# Patient Record
Sex: MALE | Race: BLACK OR AFRICAN AMERICAN | NOT HISPANIC OR LATINO | Employment: OTHER | URBAN - METROPOLITAN AREA
[De-identification: names, ages, dates, MRNs, and addresses within clinical notes are randomized per-mention and may not be internally consistent; named-entity substitution may affect disease eponyms.]

---

## 2017-01-01 ENCOUNTER — PATIENT OUTREACH (OUTPATIENT)
Dept: ADMINISTRATIVE | Facility: CLINIC | Age: 67
End: 2017-01-01

## 2017-01-01 ENCOUNTER — HOSPITAL ENCOUNTER (INPATIENT)
Facility: HOSPITAL | Age: 67
LOS: 6 days | DRG: 441 | End: 2017-12-14
Attending: SPECIALIST | Admitting: FAMILY MEDICINE
Payer: MEDICARE

## 2017-01-01 ENCOUNTER — HOSPITAL ENCOUNTER (INPATIENT)
Facility: HOSPITAL | Age: 67
LOS: 10 days | Discharge: NURSING, REHAB OR LONG-TERM CARE | DRG: 974 | End: 2017-11-22
Attending: EMERGENCY MEDICINE | Admitting: INTERNAL MEDICINE
Payer: MEDICARE

## 2017-01-01 ENCOUNTER — HOSPITAL ENCOUNTER (EMERGENCY)
Facility: HOSPITAL | Age: 67
Discharge: HOME | End: 2017-11-02
Attending: EMERGENCY MEDICINE
Payer: MEDICARE

## 2017-01-01 VITALS
OXYGEN SATURATION: 99 % | HEART RATE: 106 BPM | BODY MASS INDEX: 26.65 KG/M2 | WEIGHT: 201.06 LBS | DIASTOLIC BLOOD PRESSURE: 67 MMHG | HEIGHT: 73 IN | TEMPERATURE: 97 F | SYSTOLIC BLOOD PRESSURE: 144 MMHG | RESPIRATION RATE: 18 BRPM

## 2017-01-01 VITALS — TEMPERATURE: 32 F | BODY MASS INDEX: 26.03 KG/M2 | HEIGHT: 73 IN | WEIGHT: 196.44 LBS

## 2017-01-01 VITALS
OXYGEN SATURATION: 98 % | HEART RATE: 86 BPM | SYSTOLIC BLOOD PRESSURE: 124 MMHG | RESPIRATION RATE: 16 BRPM | WEIGHT: 207 LBS | BODY MASS INDEX: 27.43 KG/M2 | TEMPERATURE: 98 F | HEIGHT: 73 IN | DIASTOLIC BLOOD PRESSURE: 65 MMHG

## 2017-01-01 DIAGNOSIS — J18.9 PNEUMONIA DUE TO INFECTIOUS ORGANISM, UNSPECIFIED LATERALITY, UNSPECIFIED PART OF LUNG: Primary | ICD-10-CM

## 2017-01-01 DIAGNOSIS — R53.1 WEAKNESS: ICD-10-CM

## 2017-01-01 DIAGNOSIS — R41.82 ALTERED MENTAL STATUS: ICD-10-CM

## 2017-01-01 DIAGNOSIS — E72.20 HYPERAMMONEMIA: ICD-10-CM

## 2017-01-01 DIAGNOSIS — R09.02 HYPOXIA: ICD-10-CM
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
## 2017-01-01 DIAGNOSIS — J96.21 ACUTE ON CHRONIC RESPIRATORY FAILURE WITH HYPOXIA: ICD-10-CM

## 2017-01-01 DIAGNOSIS — B20 AIDS (ACQUIRED IMMUNODEFICIENCY SYNDROME), CD4 <=200: ICD-10-CM

## 2017-01-01 DIAGNOSIS — B20 AIDS (ACQUIRED IMMUNODEFICIENCY SYNDROME), CD4 >200 AND <500: Chronic | ICD-10-CM

## 2017-01-01 DIAGNOSIS — K74.60 CIRRHOSIS OF LIVER WITH ASCITES, UNSPECIFIED HEPATIC CIRRHOSIS TYPE: ICD-10-CM

## 2017-01-01 DIAGNOSIS — J18.9 PNEUMONIA DUE TO INFECTIOUS ORGANISM: ICD-10-CM

## 2017-01-01 DIAGNOSIS — R53.1 WEAKNESS: Primary | ICD-10-CM

## 2017-01-01 DIAGNOSIS — J18.9 PNEUMONIA OF BOTH LUNGS DUE TO INFECTIOUS ORGANISM, UNSPECIFIED PART OF LUNG: ICD-10-CM

## 2017-01-01 DIAGNOSIS — G93.40 ENCEPHALOPATHY ACUTE: ICD-10-CM

## 2017-01-01 DIAGNOSIS — E11.9 TYPE 2 DIABETES MELLITUS: Chronic | ICD-10-CM

## 2017-01-01 DIAGNOSIS — R18.8 CIRRHOSIS OF LIVER WITH ASCITES, UNSPECIFIED HEPATIC CIRRHOSIS TYPE: ICD-10-CM

## 2017-01-01 DIAGNOSIS — G93.40 ENCEPHALOPATHY ACUTE: Primary | ICD-10-CM

## 2017-01-01 LAB
ALBUMIN SERPL BCP-MCNC: 1.6 G/DL
ALBUMIN SERPL BCP-MCNC: 1.7 G/DL
ALBUMIN SERPL BCP-MCNC: 1.8 G/DL
ALBUMIN SERPL BCP-MCNC: 1.9 G/DL
ALBUMIN SERPL BCP-MCNC: 2 G/DL
ALBUMIN SERPL BCP-MCNC: 2.1 G/DL
ALBUMIN SERPL BCP-MCNC: 2.2 G/DL
ALBUMIN SERPL BCP-MCNC: 2.3 G/DL
ALBUMIN SERPL BCP-MCNC: 2.3 G/DL
ALBUMIN SERPL BCP-MCNC: 2.4 G/DL
ALBUMIN SERPL BCP-MCNC: 2.4 G/DL
ALBUMIN SERPL BCP-MCNC: 2.8 G/DL
ALLENS TEST: ABNORMAL
ALP SERPL-CCNC: 104 U/L
ALP SERPL-CCNC: 105 U/L
ALP SERPL-CCNC: 106 U/L
ALP SERPL-CCNC: 107 U/L
ALP SERPL-CCNC: 126 U/L
ALP SERPL-CCNC: 133 U/L
ALP SERPL-CCNC: 146 U/L
ALP SERPL-CCNC: 156 U/L
ALP SERPL-CCNC: 179 U/L
ALP SERPL-CCNC: 205 U/L
ALP SERPL-CCNC: 215 U/L
ALP SERPL-CCNC: 234 U/L
ALP SERPL-CCNC: 252 U/L
ALP SERPL-CCNC: 278 U/L
ALP SERPL-CCNC: 310 U/L
ALP SERPL-CCNC: 331 U/L
ALP SERPL-CCNC: 331 U/L
ALP SERPL-CCNC: 345 U/L
ALP SERPL-CCNC: 350 U/L
ALP SERPL-CCNC: 405 U/L
ALT SERPL W/O P-5'-P-CCNC: 106 U/L
ALT SERPL W/O P-5'-P-CCNC: 109 U/L
ALT SERPL W/O P-5'-P-CCNC: 118 U/L
ALT SERPL W/O P-5'-P-CCNC: 126 U/L
ALT SERPL W/O P-5'-P-CCNC: 131 U/L
ALT SERPL W/O P-5'-P-CCNC: 132 U/L
ALT SERPL W/O P-5'-P-CCNC: 140 U/L
ALT SERPL W/O P-5'-P-CCNC: 143 U/L
ALT SERPL W/O P-5'-P-CCNC: 145 U/L
ALT SERPL W/O P-5'-P-CCNC: 149 U/L
ALT SERPL W/O P-5'-P-CCNC: 177 U/L
ALT SERPL W/O P-5'-P-CCNC: 237 U/L
ALT SERPL W/O P-5'-P-CCNC: 32 U/L
ALT SERPL W/O P-5'-P-CCNC: 33 U/L
ALT SERPL W/O P-5'-P-CCNC: 40 U/L
ALT SERPL W/O P-5'-P-CCNC: 43 U/L
ALT SERPL W/O P-5'-P-CCNC: 45 U/L
ALT SERPL W/O P-5'-P-CCNC: 53 U/L
ALT SERPL W/O P-5'-P-CCNC: 66 U/L
ALT SERPL W/O P-5'-P-CCNC: 97 U/L
AMMONIA PLAS-SCNC: 100 UMOL/L
AMMONIA PLAS-SCNC: 145 UMOL/L
AMMONIA PLAS-SCNC: 28 UMOL/L
AMMONIA PLAS-SCNC: 37 UMOL/L
AMMONIA PLAS-SCNC: 58 UMOL/L
AMMONIA PLAS-SCNC: 75 UMOL/L
AMMONIA PLAS-SCNC: 86 UMOL/L
ANION GAP SERPL CALC-SCNC: 10 MMOL/L
ANION GAP SERPL CALC-SCNC: 4 MMOL/L
ANION GAP SERPL CALC-SCNC: 4 MMOL/L
ANION GAP SERPL CALC-SCNC: 5 MMOL/L
ANION GAP SERPL CALC-SCNC: 5 MMOL/L
ANION GAP SERPL CALC-SCNC: 6 MMOL/L
ANION GAP SERPL CALC-SCNC: 7 MMOL/L
ANION GAP SERPL CALC-SCNC: 8 MMOL/L
ANION GAP SERPL CALC-SCNC: 9 MMOL/L
ANISOCYTOSIS BLD QL SMEAR: SLIGHT
ANISOCYTOSIS BLD QL SMEAR: SLIGHT
APAP SERPL-MCNC: <3 UG/ML
APPEARANCE FLD: NORMAL
APTT BLDCRRT: 30.8 SEC
APTT BLDCRRT: 31.3 SEC
APTT BLDCRRT: 32.7 SEC
AST SERPL-CCNC: 100 U/L
AST SERPL-CCNC: 107 U/L
AST SERPL-CCNC: 119 U/L
AST SERPL-CCNC: 122 U/L
AST SERPL-CCNC: 123 U/L
AST SERPL-CCNC: 127 U/L
AST SERPL-CCNC: 131 U/L
AST SERPL-CCNC: 139 U/L
AST SERPL-CCNC: 150 U/L
AST SERPL-CCNC: 151 U/L
AST SERPL-CCNC: 158 U/L
AST SERPL-CCNC: 233 U/L
AST SERPL-CCNC: 57 U/L
AST SERPL-CCNC: 63 U/L
AST SERPL-CCNC: 68 U/L
AST SERPL-CCNC: 74 U/L
AST SERPL-CCNC: 77 U/L
AST SERPL-CCNC: 80 U/L
AST SERPL-CCNC: 86 U/L
AST SERPL-CCNC: 86 U/L
BACTERIA BLD CULT: NORMAL
BACTERIA BLD CULT: NORMAL
BACTERIA FLD AEROBE CULT: NO GROWTH
BACTERIA UR CULT: NO GROWTH
BASOPHILS # BLD AUTO: 0 K/UL
BASOPHILS # BLD AUTO: 0.01 K/UL
BASOPHILS # BLD AUTO: 0.02 K/UL
BASOPHILS # BLD AUTO: 0.02 K/UL
BASOPHILS # BLD AUTO: 0.03 K/UL
BASOPHILS # BLD AUTO: 0.03 K/UL
BASOPHILS # BLD AUTO: 0.07 K/UL
BASOPHILS # BLD AUTO: ABNORMAL K/UL
BASOPHILS NFR BLD: 0 %
BASOPHILS NFR BLD: 0.2 %
BASOPHILS NFR BLD: 0.2 %
BASOPHILS NFR BLD: 0.3 %
BASOPHILS NFR BLD: 0.4 %
BASOPHILS NFR BLD: 0.5 %
BASOPHILS NFR BLD: 0.5 %
BASOPHILS NFR BLD: 0.6 %
BASOPHILS NFR BLD: 0.9 %
BILIRUB SERPL-MCNC: 0.5 MG/DL
BILIRUB SERPL-MCNC: 0.6 MG/DL
BILIRUB SERPL-MCNC: 0.7 MG/DL
BILIRUB SERPL-MCNC: 0.8 MG/DL
BILIRUB SERPL-MCNC: 1.1 MG/DL
BILIRUB SERPL-MCNC: 1.2 MG/DL
BILIRUB SERPL-MCNC: 3 MG/DL
BILIRUB SERPL-MCNC: 3.2 MG/DL
BILIRUB SERPL-MCNC: 3.2 MG/DL
BILIRUB SERPL-MCNC: 4.5 MG/DL
BILIRUB SERPL-MCNC: 5.8 MG/DL
BILIRUB SERPL-MCNC: 5.9 MG/DL
BILIRUB SERPL-MCNC: 6.2 MG/DL
BILIRUB SERPL-MCNC: 6.7 MG/DL
BILIRUB UR QL STRIP: ABNORMAL
BILIRUB UR QL STRIP: ABNORMAL
BILIRUB UR QL STRIP: NEGATIVE
BNP SERPL-MCNC: 43 PG/ML
BNP SERPL-MCNC: 48 PG/ML
BODY FLD TYPE: NORMAL
BUN SERPL-MCNC: 10 MG/DL
BUN SERPL-MCNC: 11 MG/DL
BUN SERPL-MCNC: 11 MG/DL
BUN SERPL-MCNC: 13 MG/DL
BUN SERPL-MCNC: 15 MG/DL
BUN SERPL-MCNC: 16 MG/DL
BUN SERPL-MCNC: 17 MG/DL
BUN SERPL-MCNC: 17 MG/DL
BUN SERPL-MCNC: 26 MG/DL
BUN SERPL-MCNC: 29 MG/DL
BUN SERPL-MCNC: 31 MG/DL
BUN SERPL-MCNC: 34 MG/DL
BUN SERPL-MCNC: 45 MG/DL
BUN SERPL-MCNC: 48 MG/DL
BUN SERPL-MCNC: 52 MG/DL
BUN SERPL-MCNC: 60 MG/DL
BURR CELLS BLD QL SMEAR: ABNORMAL
BURR CELLS BLD QL SMEAR: ABNORMAL
CALCIUM SERPL-MCNC: 10.1 MG/DL
CALCIUM SERPL-MCNC: 10.1 MG/DL
CALCIUM SERPL-MCNC: 10.3 MG/DL
CALCIUM SERPL-MCNC: 10.4 MG/DL
CALCIUM SERPL-MCNC: 10.6 MG/DL
CALCIUM SERPL-MCNC: 10.7 MG/DL
CALCIUM SERPL-MCNC: 10.8 MG/DL
CALCIUM SERPL-MCNC: 10.8 MG/DL
CALCIUM SERPL-MCNC: 10.9 MG/DL
CALCIUM SERPL-MCNC: 11 MG/DL
CALCIUM SERPL-MCNC: 11.2 MG/DL
CALCIUM SERPL-MCNC: 11.2 MG/DL
CALCIUM SERPL-MCNC: 11.7 MG/DL
CALCIUM SERPL-MCNC: 11.8 MG/DL
CALCIUM SERPL-MCNC: 12 MG/DL
CALCIUM SERPL-MCNC: 8.8 MG/DL
CALCIUM SERPL-MCNC: 9.2 MG/DL
CALCIUM SERPL-MCNC: 9.5 MG/DL
CALCIUM SERPL-MCNC: 9.5 MG/DL
CALCIUM SERPL-MCNC: 9.9 MG/DL
CD3+CD4+ CELLS # BLD: 71 CELLS/UL (ref 300–1400)
CD3+CD4+ CELLS NFR BLD: 3.8 % (ref 28–57)
CHLORIDE SERPL-SCNC: 102 MMOL/L
CHLORIDE SERPL-SCNC: 102 MMOL/L
CHLORIDE SERPL-SCNC: 103 MMOL/L
CHLORIDE SERPL-SCNC: 103 MMOL/L
CHLORIDE SERPL-SCNC: 105 MMOL/L
CHLORIDE SERPL-SCNC: 106 MMOL/L
CHLORIDE SERPL-SCNC: 106 MMOL/L
CHLORIDE SERPL-SCNC: 107 MMOL/L
CHLORIDE SERPL-SCNC: 108 MMOL/L
CHLORIDE SERPL-SCNC: 108 MMOL/L
CHLORIDE SERPL-SCNC: 109 MMOL/L
CHLORIDE SERPL-SCNC: 110 MMOL/L
CHLORIDE SERPL-SCNC: 111 MMOL/L
CHLORIDE SERPL-SCNC: 112 MMOL/L
CHLORIDE SERPL-SCNC: 113 MMOL/L
CHLORIDE SERPL-SCNC: 114 MMOL/L
CHLORIDE SERPL-SCNC: 115 MMOL/L
CHLORIDE SERPL-SCNC: 119 MMOL/L
CK SERPL-CCNC: 1010 U/L
CK SERPL-CCNC: 85 U/L
CLARITY CSF: ABNORMAL
CLARITY CSF: ABNORMAL
CLARITY UR: CLEAR
CO2 SERPL-SCNC: 14 MMOL/L
CO2 SERPL-SCNC: 16 MMOL/L
CO2 SERPL-SCNC: 17 MMOL/L
CO2 SERPL-SCNC: 17 MMOL/L
CO2 SERPL-SCNC: 18 MMOL/L
CO2 SERPL-SCNC: 18 MMOL/L
CO2 SERPL-SCNC: 19 MMOL/L
CO2 SERPL-SCNC: 20 MMOL/L
CO2 SERPL-SCNC: 21 MMOL/L
CO2 SERPL-SCNC: 22 MMOL/L
CO2 SERPL-SCNC: 23 MMOL/L
CO2 SERPL-SCNC: 24 MMOL/L
CO2 SERPL-SCNC: 25 MMOL/L
COLOR CSF: ABNORMAL
COLOR CSF: ABNORMAL
COLOR FLD: NORMAL
COLOR UR: YELLOW
CORTIS SERPL-MCNC: 23.6 UG/DL
CREAT SERPL-MCNC: 0.6 MG/DL
CREAT SERPL-MCNC: 0.7 MG/DL
CREAT SERPL-MCNC: 0.8 MG/DL
CREAT SERPL-MCNC: 0.9 MG/DL
CREAT SERPL-MCNC: 1.1 MG/DL
CREAT SERPL-MCNC: 1.1 MG/DL
CREAT SERPL-MCNC: 1.2 MG/DL
CREAT SERPL-MCNC: 1.6 MG/DL
CREAT SERPL-MCNC: 1.8 MG/DL
CREAT SERPL-MCNC: 2 MG/DL
CREAT SERPL-MCNC: 2.3 MG/DL
D DIMER PPP IA.FEU-MCNC: 1.45 MG/L FEU
DACRYOCYTES BLD QL SMEAR: ABNORMAL
DACRYOCYTES BLD QL SMEAR: ABNORMAL
DELSYS: ABNORMAL
DIFFERENTIAL METHOD: ABNORMAL
EOSINOPHIL # BLD AUTO: 0 K/UL
EOSINOPHIL # BLD AUTO: 0.1 K/UL
EOSINOPHIL # BLD AUTO: 0.2 K/UL
EOSINOPHIL # BLD AUTO: 0.3 K/UL
EOSINOPHIL # BLD AUTO: ABNORMAL K/UL
EOSINOPHIL NFR BLD: 0 %
EOSINOPHIL NFR BLD: 0.1 %
EOSINOPHIL NFR BLD: 0.5 %
EOSINOPHIL NFR BLD: 0.9 %
EOSINOPHIL NFR BLD: 1 %
EOSINOPHIL NFR BLD: 1.4 %
EOSINOPHIL NFR BLD: 1.6 %
EOSINOPHIL NFR BLD: 3 %
EOSINOPHIL NFR BLD: 3.2 %
EOSINOPHIL NFR BLD: 3.5 %
EOSINOPHIL NFR BLD: 4.1 %
EOSINOPHIL NFR BLD: 4.4 %
EOSINOPHIL NFR BLD: 4.8 %
EOSINOPHIL NFR BLD: 5.5 %
EOSINOPHIL NFR BLD: 7.1 %
ERYTHROCYTE [DISTWIDTH] IN BLOOD BY AUTOMATED COUNT: 13.9 %
ERYTHROCYTE [DISTWIDTH] IN BLOOD BY AUTOMATED COUNT: 13.9 %
ERYTHROCYTE [DISTWIDTH] IN BLOOD BY AUTOMATED COUNT: 14 %
ERYTHROCYTE [DISTWIDTH] IN BLOOD BY AUTOMATED COUNT: 14.3 %
ERYTHROCYTE [DISTWIDTH] IN BLOOD BY AUTOMATED COUNT: 14.4 %
ERYTHROCYTE [DISTWIDTH] IN BLOOD BY AUTOMATED COUNT: 14.6 %
ERYTHROCYTE [DISTWIDTH] IN BLOOD BY AUTOMATED COUNT: 14.6 %
ERYTHROCYTE [DISTWIDTH] IN BLOOD BY AUTOMATED COUNT: 14.7 %
ERYTHROCYTE [DISTWIDTH] IN BLOOD BY AUTOMATED COUNT: 14.8 %
ERYTHROCYTE [DISTWIDTH] IN BLOOD BY AUTOMATED COUNT: 14.8 %
ERYTHROCYTE [DISTWIDTH] IN BLOOD BY AUTOMATED COUNT: 17.5 %
ERYTHROCYTE [DISTWIDTH] IN BLOOD BY AUTOMATED COUNT: 17.7 %
ERYTHROCYTE [DISTWIDTH] IN BLOOD BY AUTOMATED COUNT: 18.3 %
ERYTHROCYTE [DISTWIDTH] IN BLOOD BY AUTOMATED COUNT: 18.4 %
ERYTHROCYTE [DISTWIDTH] IN BLOOD BY AUTOMATED COUNT: 18.5 %
ERYTHROCYTE [DISTWIDTH] IN BLOOD BY AUTOMATED COUNT: 18.9 %
ERYTHROCYTE [DISTWIDTH] IN BLOOD BY AUTOMATED COUNT: 19.5 %
EST. GFR  (AFRICAN AMERICAN): 33 ML/MIN/1.73 M^2
EST. GFR  (AFRICAN AMERICAN): 39 ML/MIN/1.73 M^2
EST. GFR  (AFRICAN AMERICAN): 44 ML/MIN/1.73 M^2
EST. GFR  (AFRICAN AMERICAN): 51 ML/MIN/1.73 M^2
EST. GFR  (AFRICAN AMERICAN): >60 ML/MIN/1.73 M^2
EST. GFR  (NON AFRICAN AMERICAN): 28 ML/MIN/1.73 M^2
EST. GFR  (NON AFRICAN AMERICAN): 34 ML/MIN/1.73 M^2
EST. GFR  (NON AFRICAN AMERICAN): 38 ML/MIN/1.73 M^2
EST. GFR  (NON AFRICAN AMERICAN): 44 ML/MIN/1.73 M^2
EST. GFR  (NON AFRICAN AMERICAN): >60 ML/MIN/1.73 M^2
ESTIMATED AVG GLUCOSE: 94 MG/DL
FIO2: 21
FLUAV AG SPEC QL IA: NEGATIVE
FLUBV AG SPEC QL IA: NEGATIVE
GLUCOSE CSF-MCNC: 87 MG/DL
GLUCOSE SERPL-MCNC: 105 MG/DL
GLUCOSE SERPL-MCNC: 111 MG/DL
GLUCOSE SERPL-MCNC: 116 MG/DL
GLUCOSE SERPL-MCNC: 118 MG/DL
GLUCOSE SERPL-MCNC: 121 MG/DL
GLUCOSE SERPL-MCNC: 130 MG/DL
GLUCOSE SERPL-MCNC: 131 MG/DL
GLUCOSE SERPL-MCNC: 159 MG/DL
GLUCOSE SERPL-MCNC: 177 MG/DL
GLUCOSE SERPL-MCNC: 186 MG/DL
GLUCOSE SERPL-MCNC: 229 MG/DL
GLUCOSE SERPL-MCNC: 241 MG/DL
GLUCOSE SERPL-MCNC: 252 MG/DL
GLUCOSE SERPL-MCNC: 261 MG/DL
GLUCOSE SERPL-MCNC: 370 MG/DL
GLUCOSE SERPL-MCNC: 56 MG/DL
GLUCOSE SERPL-MCNC: 86 MG/DL
GLUCOSE SERPL-MCNC: 92 MG/DL
GLUCOSE SERPL-MCNC: 96 MG/DL
GLUCOSE SERPL-MCNC: 97 MG/DL
GLUCOSE UR QL STRIP: NEGATIVE
GRAM STN SPEC: NORMAL
GRAM STN SPEC: NORMAL
HBA1C MFR BLD HPLC: 4.9 %
HCO3 UR-SCNC: 19.7 MMOL/L (ref 24–28)
HCT VFR BLD AUTO: 33.5 %
HCT VFR BLD AUTO: 33.8 %
HCT VFR BLD AUTO: 34.6 %
HCT VFR BLD AUTO: 34.7 %
HCT VFR BLD AUTO: 35.8 %
HCT VFR BLD AUTO: 35.9 %
HCT VFR BLD AUTO: 36.5 %
HCT VFR BLD AUTO: 37.1 %
HCT VFR BLD AUTO: 37.2 %
HCT VFR BLD AUTO: 37.6 %
HCT VFR BLD AUTO: 38.8 %
HCT VFR BLD AUTO: 41 %
HCT VFR BLD AUTO: 41.6 %
HCT VFR BLD AUTO: 44.8 %
HCT VFR BLD AUTO: 45.6 %
HCT VFR BLD AUTO: 45.8 %
HCT VFR BLD AUTO: 46.3 %
HCT VFR BLD AUTO: 46.5 %
HCT VFR BLD AUTO: 47.5 %
HGB BLD-MCNC: 11.5 G/DL
HGB BLD-MCNC: 11.7 G/DL
HGB BLD-MCNC: 12 G/DL
HGB BLD-MCNC: 12.1 G/DL
HGB BLD-MCNC: 12.4 G/DL
HGB BLD-MCNC: 12.5 G/DL
HGB BLD-MCNC: 12.5 G/DL
HGB BLD-MCNC: 12.6 G/DL
HGB BLD-MCNC: 12.7 G/DL
HGB BLD-MCNC: 12.8 G/DL
HGB BLD-MCNC: 13.1 G/DL
HGB BLD-MCNC: 13.8 G/DL
HGB BLD-MCNC: 13.9 G/DL
HGB BLD-MCNC: 15.3 G/DL
HGB BLD-MCNC: 15.5 G/DL
HGB BLD-MCNC: 15.8 G/DL
HGB BLD-MCNC: 15.9 G/DL
HGB BLD-MCNC: 16 G/DL
HGB BLD-MCNC: 16.7 G/DL
HGB UR QL STRIP: ABNORMAL
HGB UR QL STRIP: NEGATIVE
HGB UR QL STRIP: NEGATIVE
HIV UQ DATE RECEIVED: ABNORMAL
HIV UQ DATE REPORTED: ABNORMAL
HIV1 RNA # SERPL NAA+PROBE: ABNORMAL COPIES/ML
HIV1 RNA SERPL NAA+PROBE-LOG#: 5.69 LOG (10) COPIES/ML
HIV1 RNA SERPL QL NAA+PROBE: DETECTED
INR PPP: 1.2
INR PPP: 1.5
INR PPP: 1.5
INR PPP: 1.8
INR PPP: 2.1
INR PPP: 2.2
JCPYV DNA CSF QL NAA+PROBE: NEGATIVE
KETONES UR QL STRIP: NEGATIVE
LACTATE SERPL-SCNC: 1.1 MMOL/L
LACTATE SERPL-SCNC: 1.9 MMOL/L
LACTATE SERPL-SCNC: 2.2 MMOL/L
LACTATE SERPL-SCNC: 2.2 MMOL/L
LEUKOCYTE ESTERASE UR QL STRIP: NEGATIVE
LIPASE SERPL-CCNC: 14 U/L
LIPASE SERPL-CCNC: 32 U/L
LYMPHOCYTES # BLD AUTO: 0.4 K/UL
LYMPHOCYTES # BLD AUTO: 0.5 K/UL
LYMPHOCYTES # BLD AUTO: 0.6 K/UL
LYMPHOCYTES # BLD AUTO: 0.7 K/UL
LYMPHOCYTES # BLD AUTO: 0.9 K/UL
LYMPHOCYTES # BLD AUTO: 1 K/UL
LYMPHOCYTES # BLD AUTO: 1.1 K/UL
LYMPHOCYTES # BLD AUTO: 1.1 K/UL
LYMPHOCYTES # BLD AUTO: 1.2 K/UL
LYMPHOCYTES # BLD AUTO: 1.2 K/UL
LYMPHOCYTES # BLD AUTO: 1.3 K/UL
LYMPHOCYTES # BLD AUTO: ABNORMAL K/UL
LYMPHOCYTES NFR BLD: 10.3 %
LYMPHOCYTES NFR BLD: 10.9 %
LYMPHOCYTES NFR BLD: 11.8 %
LYMPHOCYTES NFR BLD: 12 %
LYMPHOCYTES NFR BLD: 12.9 %
LYMPHOCYTES NFR BLD: 13.4 %
LYMPHOCYTES NFR BLD: 14.5 %
LYMPHOCYTES NFR BLD: 15.1 %
LYMPHOCYTES NFR BLD: 16.7 %
LYMPHOCYTES NFR BLD: 18.6 %
LYMPHOCYTES NFR BLD: 18.8 %
LYMPHOCYTES NFR BLD: 21.4 %
LYMPHOCYTES NFR BLD: 22.4 %
LYMPHOCYTES NFR BLD: 24.8 %
LYMPHOCYTES NFR BLD: 29 %
LYMPHOCYTES NFR BLD: 35.7 %
LYMPHOCYTES NFR BLD: 36.4 %
LYMPHOCYTES NFR BLD: 8.5 %
LYMPHOCYTES NFR FLD MANUAL: 41 %
MAGNESIUM SERPL-MCNC: 1.5 MG/DL
MAGNESIUM SERPL-MCNC: 1.6 MG/DL
MAGNESIUM SERPL-MCNC: 1.7 MG/DL
MAGNESIUM SERPL-MCNC: 1.7 MG/DL
MAGNESIUM SERPL-MCNC: 1.8 MG/DL
MAGNESIUM SERPL-MCNC: 1.9 MG/DL
MAGNESIUM SERPL-MCNC: 1.9 MG/DL
MAGNESIUM SERPL-MCNC: 2.1 MG/DL
MAGNESIUM SERPL-MCNC: 2.3 MG/DL
MCH RBC QN AUTO: 30.8 PG
MCH RBC QN AUTO: 31 PG
MCH RBC QN AUTO: 31 PG
MCH RBC QN AUTO: 31.1 PG
MCH RBC QN AUTO: 31.1 PG
MCH RBC QN AUTO: 31.3 PG
MCH RBC QN AUTO: 31.4 PG
MCH RBC QN AUTO: 31.4 PG
MCH RBC QN AUTO: 31.6 PG
MCH RBC QN AUTO: 31.7 PG
MCH RBC QN AUTO: 31.7 PG
MCH RBC QN AUTO: 31.9 PG
MCH RBC QN AUTO: 32.1 PG
MCH RBC QN AUTO: 32.1 PG
MCH RBC QN AUTO: 32.2 PG
MCH RBC QN AUTO: 32.2 PG
MCH RBC QN AUTO: 32.4 PG
MCHC RBC AUTO-ENTMCNC: 33.2 G/DL
MCHC RBC AUTO-ENTMCNC: 33.7 G/DL
MCHC RBC AUTO-ENTMCNC: 33.8 G/DL
MCHC RBC AUTO-ENTMCNC: 33.8 G/DL
MCHC RBC AUTO-ENTMCNC: 33.9 G/DL
MCHC RBC AUTO-ENTMCNC: 34 G/DL
MCHC RBC AUTO-ENTMCNC: 34.1 G/DL
MCHC RBC AUTO-ENTMCNC: 34.2 G/DL
MCHC RBC AUTO-ENTMCNC: 34.2 G/DL
MCHC RBC AUTO-ENTMCNC: 34.3 G/DL
MCHC RBC AUTO-ENTMCNC: 34.4 G/DL
MCHC RBC AUTO-ENTMCNC: 34.4 G/DL
MCHC RBC AUTO-ENTMCNC: 34.6 G/DL
MCHC RBC AUTO-ENTMCNC: 34.6 G/DL
MCHC RBC AUTO-ENTMCNC: 34.7 G/DL
MCHC RBC AUTO-ENTMCNC: 34.7 G/DL
MCHC RBC AUTO-ENTMCNC: 34.9 G/DL
MCHC RBC AUTO-ENTMCNC: 35.1 G/DL
MCHC RBC AUTO-ENTMCNC: 35.2 G/DL
MCV RBC AUTO: 89 FL
MCV RBC AUTO: 90 FL
MCV RBC AUTO: 90 FL
MCV RBC AUTO: 91 FL
MCV RBC AUTO: 92 FL
MCV RBC AUTO: 92 FL
MCV RBC AUTO: 93 FL
MCV RBC AUTO: 94 FL
MCV RBC AUTO: 95 FL
MCV RBC AUTO: 95 FL
MODE: ABNORMAL
MONOCYTES # BLD AUTO: 0.2 K/UL
MONOCYTES # BLD AUTO: 0.3 K/UL
MONOCYTES # BLD AUTO: 0.4 K/UL
MONOCYTES # BLD AUTO: 0.6 K/UL
MONOCYTES # BLD AUTO: 0.6 K/UL
MONOCYTES # BLD AUTO: ABNORMAL K/UL
MONOCYTES NFR BLD: 12.2 %
MONOCYTES NFR BLD: 2 %
MONOCYTES NFR BLD: 4.7 %
MONOCYTES NFR BLD: 4.9 %
MONOCYTES NFR BLD: 5.2 %
MONOCYTES NFR BLD: 5.8 %
MONOCYTES NFR BLD: 6.2 %
MONOCYTES NFR BLD: 6.4 %
MONOCYTES NFR BLD: 6.7 %
MONOCYTES NFR BLD: 7.3 %
MONOCYTES NFR BLD: 7.4 %
MONOCYTES NFR BLD: 7.4 %
MONOCYTES NFR BLD: 7.9 %
MONOCYTES NFR BLD: 8.5 %
MONOCYTES NFR BLD: 8.7 %
MONOCYTES NFR BLD: 9.2 %
MONOCYTES NFR BLD: 9.5 %
MONOCYTES NFR BLD: 9.7 %
MONOS+MACROS NFR FLD MANUAL: 48 %
NEUTROPHILS # BLD AUTO: 1.6 K/UL
NEUTROPHILS # BLD AUTO: 1.7 K/UL
NEUTROPHILS # BLD AUTO: 2.1 K/UL
NEUTROPHILS # BLD AUTO: 2.1 K/UL
NEUTROPHILS # BLD AUTO: 2.8 K/UL
NEUTROPHILS # BLD AUTO: 2.8 K/UL
NEUTROPHILS # BLD AUTO: 2.9 K/UL
NEUTROPHILS # BLD AUTO: 3.3 K/UL
NEUTROPHILS # BLD AUTO: 3.4 K/UL
NEUTROPHILS # BLD AUTO: 3.6 K/UL
NEUTROPHILS # BLD AUTO: 3.7 K/UL
NEUTROPHILS # BLD AUTO: 3.7 K/UL
NEUTROPHILS # BLD AUTO: 4.2 K/UL
NEUTROPHILS # BLD AUTO: 4.3 K/UL
NEUTROPHILS # BLD AUTO: 5.9 K/UL
NEUTROPHILS NFR BLD: 50.5 %
NEUTROPHILS NFR BLD: 52.2 %
NEUTROPHILS NFR BLD: 64.2 %
NEUTROPHILS NFR BLD: 69 %
NEUTROPHILS NFR BLD: 69.3 %
NEUTROPHILS NFR BLD: 71 %
NEUTROPHILS NFR BLD: 71.2 %
NEUTROPHILS NFR BLD: 71.5 %
NEUTROPHILS NFR BLD: 71.7 %
NEUTROPHILS NFR BLD: 72.4 %
NEUTROPHILS NFR BLD: 72.6 %
NEUTROPHILS NFR BLD: 73.4 %
NEUTROPHILS NFR BLD: 75.6 %
NEUTROPHILS NFR BLD: 76.8 %
NEUTROPHILS NFR BLD: 79.7 %
NEUTROPHILS NFR BLD: 83.9 %
NEUTROPHILS NFR BLD: 84.2 %
NEUTROPHILS NFR BLD: 84.8 %
NEUTROPHILS NFR FLD MANUAL: 11 %
NITRITE UR QL STRIP: NEGATIVE
OB PNL STL: POSITIVE
OVALOCYTES BLD QL SMEAR: ABNORMAL
OVALOCYTES BLD QL SMEAR: ABNORMAL
PATH INTERP FLD-IMP: NORMAL
PCO2 BLDA: 31.2 MMHG (ref 35–45)
PH SMN: 7.41 [PH] (ref 7.35–7.45)
PH UR STRIP: 6 [PH] (ref 5–8)
PHOSPHATE SERPL-MCNC: 1.9 MG/DL
PHOSPHATE SERPL-MCNC: 2.2 MG/DL
PHOSPHATE SERPL-MCNC: 2.4 MG/DL
PHOSPHATE SERPL-MCNC: 2.5 MG/DL
PHOSPHATE SERPL-MCNC: 2.6 MG/DL
PHOSPHATE SERPL-MCNC: 2.8 MG/DL
PLATELET # BLD AUTO: 103 K/UL
PLATELET # BLD AUTO: 120 K/UL
PLATELET # BLD AUTO: 121 K/UL
PLATELET # BLD AUTO: 24 K/UL
PLATELET # BLD AUTO: 32 K/UL
PLATELET # BLD AUTO: 34 K/UL
PLATELET # BLD AUTO: 37 K/UL
PLATELET # BLD AUTO: 42 K/UL
PLATELET # BLD AUTO: 42 K/UL
PLATELET # BLD AUTO: 45 K/UL
PLATELET # BLD AUTO: 81 K/UL
PLATELET # BLD AUTO: 86 K/UL
PLATELET # BLD AUTO: 89 K/UL
PLATELET # BLD AUTO: 90 K/UL
PLATELET # BLD AUTO: 92 K/UL
PLATELET # BLD AUTO: 96 K/UL
PLATELET # BLD AUTO: 97 K/UL
PLATELET # BLD AUTO: 99 K/UL
PLATELET # BLD AUTO: 99 K/UL
PLATELET BLD QL SMEAR: ABNORMAL
PMV BLD AUTO: 10 FL
PMV BLD AUTO: 10.1 FL
PMV BLD AUTO: 10.1 FL
PMV BLD AUTO: 10.2 FL
PMV BLD AUTO: 10.3 FL
PMV BLD AUTO: 10.3 FL
PMV BLD AUTO: 10.5 FL
PMV BLD AUTO: 10.6 FL
PMV BLD AUTO: 10.7 FL
PMV BLD AUTO: 10.9 FL
PMV BLD AUTO: 9.4 FL
PMV BLD AUTO: 9.5 FL
PMV BLD AUTO: 9.7 FL
PMV BLD AUTO: 9.8 FL
PMV BLD AUTO: 9.9 FL
PMV BLD AUTO: ABNORMAL FL
PMV BLD AUTO: ABNORMAL FL
PO2 BLDA: 52 MMHG (ref 80–100)
POC BE: -5 MMOL/L
POC SATURATED O2: 87 % (ref 95–100)
POCT GLUCOSE: 101 MG/DL (ref 70–110)
POCT GLUCOSE: 102 MG/DL (ref 70–110)
POCT GLUCOSE: 104 MG/DL (ref 70–110)
POCT GLUCOSE: 106 MG/DL (ref 70–110)
POCT GLUCOSE: 111 MG/DL (ref 70–110)
POCT GLUCOSE: 112 MG/DL (ref 70–110)
POCT GLUCOSE: 118 MG/DL (ref 70–110)
POCT GLUCOSE: 124 MG/DL (ref 70–110)
POCT GLUCOSE: 125 MG/DL (ref 70–110)
POCT GLUCOSE: 128 MG/DL (ref 70–110)
POCT GLUCOSE: 129 MG/DL (ref 70–110)
POCT GLUCOSE: 129 MG/DL (ref 70–110)
POCT GLUCOSE: 130 MG/DL (ref 70–110)
POCT GLUCOSE: 130 MG/DL (ref 70–110)
POCT GLUCOSE: 133 MG/DL (ref 70–110)
POCT GLUCOSE: 136 MG/DL (ref 70–110)
POCT GLUCOSE: 138 MG/DL (ref 70–110)
POCT GLUCOSE: 138 MG/DL (ref 70–110)
POCT GLUCOSE: 142 MG/DL (ref 70–110)
POCT GLUCOSE: 142 MG/DL (ref 70–110)
POCT GLUCOSE: 147 MG/DL (ref 70–110)
POCT GLUCOSE: 148 MG/DL (ref 70–110)
POCT GLUCOSE: 150 MG/DL (ref 70–110)
POCT GLUCOSE: 152 MG/DL (ref 70–110)
POCT GLUCOSE: 156 MG/DL (ref 70–110)
POCT GLUCOSE: 156 MG/DL (ref 70–110)
POCT GLUCOSE: 161 MG/DL (ref 70–110)
POCT GLUCOSE: 166 MG/DL (ref 70–110)
POCT GLUCOSE: 167 MG/DL (ref 70–110)
POCT GLUCOSE: 169 MG/DL (ref 70–110)
POCT GLUCOSE: 173 MG/DL (ref 70–110)
POCT GLUCOSE: 173 MG/DL (ref 70–110)
POCT GLUCOSE: 193 MG/DL (ref 70–110)
POCT GLUCOSE: 196 MG/DL (ref 70–110)
POCT GLUCOSE: 202 MG/DL (ref 70–110)
POCT GLUCOSE: 204 MG/DL (ref 70–110)
POCT GLUCOSE: 211 MG/DL (ref 70–110)
POCT GLUCOSE: 212 MG/DL (ref 70–110)
POCT GLUCOSE: 213 MG/DL (ref 70–110)
POCT GLUCOSE: 214 MG/DL (ref 70–110)
POCT GLUCOSE: 217 MG/DL (ref 70–110)
POCT GLUCOSE: 217 MG/DL (ref 70–110)
POCT GLUCOSE: 226 MG/DL (ref 70–110)
POCT GLUCOSE: 244 MG/DL (ref 70–110)
POCT GLUCOSE: 246 MG/DL (ref 70–110)
POCT GLUCOSE: 252 MG/DL (ref 70–110)
POCT GLUCOSE: 265 MG/DL (ref 70–110)
POCT GLUCOSE: 268 MG/DL (ref 70–110)
POCT GLUCOSE: 269 MG/DL (ref 70–110)
POCT GLUCOSE: 286 MG/DL (ref 70–110)
POCT GLUCOSE: 290 MG/DL (ref 70–110)
POCT GLUCOSE: 294 MG/DL (ref 70–110)
POCT GLUCOSE: 298 MG/DL (ref 70–110)
POCT GLUCOSE: 321 MG/DL (ref 70–110)
POCT GLUCOSE: 326 MG/DL (ref 70–110)
POCT GLUCOSE: 348 MG/DL (ref 70–110)
POCT GLUCOSE: 388 MG/DL (ref 70–110)
POCT GLUCOSE: 420 MG/DL (ref 70–110)
POCT GLUCOSE: 65 MG/DL (ref 70–110)
POCT GLUCOSE: 92 MG/DL (ref 70–110)
POCT GLUCOSE: 96 MG/DL (ref 70–110)
POIKILOCYTOSIS BLD QL SMEAR: ABNORMAL
POIKILOCYTOSIS BLD QL SMEAR: SLIGHT
POIKILOCYTOSIS BLD QL SMEAR: SLIGHT
POLYCHROMASIA BLD QL SMEAR: ABNORMAL
POTASSIUM SERPL-SCNC: 3.1 MMOL/L
POTASSIUM SERPL-SCNC: 3.3 MMOL/L
POTASSIUM SERPL-SCNC: 3.5 MMOL/L
POTASSIUM SERPL-SCNC: 3.6 MMOL/L
POTASSIUM SERPL-SCNC: 3.7 MMOL/L
POTASSIUM SERPL-SCNC: 3.7 MMOL/L
POTASSIUM SERPL-SCNC: 3.9 MMOL/L
POTASSIUM SERPL-SCNC: 4 MMOL/L
POTASSIUM SERPL-SCNC: 4.1 MMOL/L
POTASSIUM SERPL-SCNC: 4.5 MMOL/L
POTASSIUM SERPL-SCNC: 4.7 MMOL/L
POTASSIUM SERPL-SCNC: 4.8 MMOL/L
POTASSIUM SERPL-SCNC: 4.9 MMOL/L
POTASSIUM SERPL-SCNC: 4.9 MMOL/L
POTASSIUM SERPL-SCNC: 5 MMOL/L
POTASSIUM SERPL-SCNC: 5.2 MMOL/L
POTASSIUM SERPL-SCNC: 5.7 MMOL/L
POTASSIUM SERPL-SCNC: 6.3 MMOL/L
PROCALCITONIN SERPL IA-MCNC: 0.39 NG/ML
PROT CSF-MCNC: 45 MG/DL
PROT SERPL-MCNC: 5.6 G/DL
PROT SERPL-MCNC: 5.7 G/DL
PROT SERPL-MCNC: 5.7 G/DL
PROT SERPL-MCNC: 5.8 G/DL
PROT SERPL-MCNC: 5.8 G/DL
PROT SERPL-MCNC: 6 G/DL
PROT SERPL-MCNC: 6.2 G/DL
PROT SERPL-MCNC: 6.3 G/DL
PROT SERPL-MCNC: 6.3 G/DL
PROT SERPL-MCNC: 6.4 G/DL
PROT SERPL-MCNC: 6.5 G/DL
PROT SERPL-MCNC: 6.6 G/DL
PROT SERPL-MCNC: 6.7 G/DL
PROT SERPL-MCNC: 6.9 G/DL
PROT SERPL-MCNC: 7 G/DL
PROT SERPL-MCNC: 7 G/DL
PROT SERPL-MCNC: 7.1 G/DL
PROT SERPL-MCNC: 7.7 G/DL
PROT UR QL STRIP: ABNORMAL
PROT UR QL STRIP: ABNORMAL
PROT UR QL STRIP: NEGATIVE
PROTHROMBIN TIME: 12.2 SEC
PROTHROMBIN TIME: 12.2 SEC
PROTHROMBIN TIME: 12.3 SEC
PROTHROMBIN TIME: 12.8 SEC
PROTHROMBIN TIME: 15.4 SEC
PROTHROMBIN TIME: 15.7 SEC
PROTHROMBIN TIME: 18.5 SEC
PROTHROMBIN TIME: 21.3 SEC
PROTHROMBIN TIME: 22.1 SEC
RBC # BLD AUTO: 3.67 M/UL
RBC # BLD AUTO: 3.77 M/UL
RBC # BLD AUTO: 3.8 M/UL
RBC # BLD AUTO: 3.89 M/UL
RBC # BLD AUTO: 3.93 M/UL
RBC # BLD AUTO: 3.99 M/UL
RBC # BLD AUTO: 4 M/UL
RBC # BLD AUTO: 4.04 M/UL
RBC # BLD AUTO: 4.06 M/UL
RBC # BLD AUTO: 4.11 M/UL
RBC # BLD AUTO: 4.19 M/UL
RBC # BLD AUTO: 4.39 M/UL
RBC # BLD AUTO: 4.39 M/UL
RBC # BLD AUTO: 4.8 M/UL
RBC # BLD AUTO: 4.82 M/UL
RBC # BLD AUTO: 4.94 M/UL
RBC # BLD AUTO: 4.95 M/UL
RBC # BLD AUTO: 4.98 M/UL
RBC # BLD AUTO: 5.18 M/UL
RBC # CSF: 1525 /CU MM
RBC # CSF: 618 /CU MM
RPR SER QL: REACTIVE
RPR SER QL: REACTIVE
RPR SER-TITR: ABNORMAL {TITER}
SALICYLATES SERPL-MCNC: <5 MG/DL
SAMPLE: ABNORMAL
SITE: ABNORMAL
SODIUM SERPL-SCNC: 132 MMOL/L
SODIUM SERPL-SCNC: 133 MMOL/L
SODIUM SERPL-SCNC: 133 MMOL/L
SODIUM SERPL-SCNC: 134 MMOL/L
SODIUM SERPL-SCNC: 135 MMOL/L
SODIUM SERPL-SCNC: 136 MMOL/L
SODIUM SERPL-SCNC: 137 MMOL/L
SODIUM SERPL-SCNC: 139 MMOL/L
SODIUM SERPL-SCNC: 139 MMOL/L
SODIUM SERPL-SCNC: 140 MMOL/L
SODIUM SERPL-SCNC: 141 MMOL/L
SODIUM SERPL-SCNC: 143 MMOL/L
SP GR UR STRIP: 1.01 (ref 1–1.03)
SP GR UR STRIP: 1.02 (ref 1–1.03)
SP GR UR STRIP: 1.02 (ref 1–1.03)
SPECIMEN SOURCE: NORMAL
SPECIMEN VOL CSF: 2 ML
SPECIMEN VOL CSF: 5 ML
T PALLIDUM AB SER QL IF: REACTIVE
T4 FREE SERPL-MCNC: 1.06 NG/DL
TARGETS BLD QL SMEAR: ABNORMAL
TB INDURATION 48 - 72 HR READ: 0 MM
TROPONIN I SERPL DL<=0.01 NG/ML-MCNC: 0.01 NG/ML
TROPONIN I SERPL DL<=0.01 NG/ML-MCNC: 0.02 NG/ML
TROPONIN I SERPL DL<=0.01 NG/ML-MCNC: 0.02 NG/ML
TSH SERPL DL<=0.005 MIU/L-ACNC: 0.39 UIU/ML
TSH SERPL DL<=0.005 MIU/L-ACNC: 1.02 UIU/ML
URN SPEC COLLECT METH UR: ABNORMAL
UROBILINOGEN UR STRIP-ACNC: >=8 EU/DL
UROBILINOGEN UR STRIP-ACNC: ABNORMAL EU/DL
UROBILINOGEN UR STRIP-ACNC: ABNORMAL EU/DL
VDRL CSF QL: NORMAL
WBC # BLD AUTO: 1.47 K/UL
WBC # BLD AUTO: 2.96 K/UL
WBC # BLD AUTO: 2.97 K/UL
WBC # BLD AUTO: 3.09 K/UL
WBC # BLD AUTO: 3.3 K/UL
WBC # BLD AUTO: 3.33 K/UL
WBC # BLD AUTO: 3.39 K/UL
WBC # BLD AUTO: 3.81 K/UL
WBC # BLD AUTO: 3.89 K/UL
WBC # BLD AUTO: 4 K/UL
WBC # BLD AUTO: 4.1 K/UL
WBC # BLD AUTO: 4.36 K/UL
WBC # BLD AUTO: 4.44 K/UL
WBC # BLD AUTO: 4.57 K/UL
WBC # BLD AUTO: 4.67 K/UL
WBC # BLD AUTO: 4.99 K/UL
WBC # BLD AUTO: 5.68 K/UL
WBC # BLD AUTO: 5.8 K/UL
WBC # BLD AUTO: 7.73 K/UL
WBC # CSF: 2 /CU MM
WBC # CSF: 4 /CU MM
WBC # FLD: 304 /CU MM

## 2017-01-01 PROCEDURE — 99900035 HC TECH TIME PER 15 MIN (STAT)

## 2017-01-01 PROCEDURE — 21400001 HC TELEMETRY ROOM

## 2017-01-01 PROCEDURE — 63600175 PHARM REV CODE 636 W HCPCS: Performed by: FAMILY MEDICINE

## 2017-01-01 PROCEDURE — 99284 EMERGENCY DEPT VISIT MOD MDM: CPT

## 2017-01-01 PROCEDURE — 87902 NFCT AGT GNTYP ALYS HEP C: CPT

## 2017-01-01 PROCEDURE — 27000221 HC OXYGEN, UP TO 24 HOURS

## 2017-01-01 PROCEDURE — 0W9G3ZZ DRAINAGE OF PERITONEAL CAVITY, PERCUTANEOUS APPROACH: ICD-10-PCS | Performed by: RADIOLOGY

## 2017-01-01 PROCEDURE — 97116 GAIT TRAINING THERAPY: CPT

## 2017-01-01 PROCEDURE — 36415 COLL VENOUS BLD VENIPUNCTURE: CPT

## 2017-01-01 PROCEDURE — 85025 COMPLETE CBC W/AUTO DIFF WBC: CPT

## 2017-01-01 PROCEDURE — 83735 ASSAY OF MAGNESIUM: CPT

## 2017-01-01 PROCEDURE — 25000003 PHARM REV CODE 250: Performed by: EMERGENCY MEDICINE

## 2017-01-01 PROCEDURE — S0028 INJECTION, FAMOTIDINE, 20 MG: HCPCS | Performed by: EMERGENCY MEDICINE

## 2017-01-01 PROCEDURE — 25000003 PHARM REV CODE 250: Performed by: FAMILY MEDICINE

## 2017-01-01 PROCEDURE — 84100 ASSAY OF PHOSPHORUS: CPT

## 2017-01-01 PROCEDURE — 87400 INFLUENZA A/B EACH AG IA: CPT | Mod: 59

## 2017-01-01 PROCEDURE — 94640 AIRWAY INHALATION TREATMENT: CPT

## 2017-01-01 PROCEDURE — 25000003 PHARM REV CODE 250: Performed by: NURSE PRACTITIONER

## 2017-01-01 PROCEDURE — 63600175 PHARM REV CODE 636 W HCPCS: Performed by: NURSE PRACTITIONER

## 2017-01-01 PROCEDURE — 83880 ASSAY OF NATRIURETIC PEPTIDE: CPT

## 2017-01-01 PROCEDURE — 25000003 PHARM REV CODE 250: Performed by: INTERNAL MEDICINE

## 2017-01-01 PROCEDURE — 81003 URINALYSIS AUTO W/O SCOPE: CPT

## 2017-01-01 PROCEDURE — 80053 COMPREHEN METABOLIC PANEL: CPT

## 2017-01-01 PROCEDURE — G8987 SELF CARE CURRENT STATUS: HCPCS | Mod: CL

## 2017-01-01 PROCEDURE — 80307 DRUG TEST PRSMV CHEM ANLYZR: CPT

## 2017-01-01 PROCEDURE — 96375 TX/PRO/DX INJ NEW DRUG ADDON: CPT

## 2017-01-01 PROCEDURE — G8978 MOBILITY CURRENT STATUS: HCPCS | Mod: CL

## 2017-01-01 PROCEDURE — 97162 PT EVAL MOD COMPLEX 30 MIN: CPT

## 2017-01-01 PROCEDURE — 82140 ASSAY OF AMMONIA: CPT

## 2017-01-01 PROCEDURE — 87517 HEPATITIS B DNA QUANT: CPT

## 2017-01-01 PROCEDURE — S0039 INJECTION, SULFAMETHOXAZOLE: HCPCS | Performed by: FAMILY MEDICINE

## 2017-01-01 PROCEDURE — 009U3ZX DRAINAGE OF SPINAL CANAL, PERCUTANEOUS APPROACH, DIAGNOSTIC: ICD-10-PCS | Performed by: RADIOLOGY

## 2017-01-01 PROCEDURE — 85007 BL SMEAR W/DIFF WBC COUNT: CPT

## 2017-01-01 PROCEDURE — 63600175 PHARM REV CODE 636 W HCPCS: Performed by: EMERGENCY MEDICINE

## 2017-01-01 PROCEDURE — 84484 ASSAY OF TROPONIN QUANT: CPT

## 2017-01-01 PROCEDURE — S0028 INJECTION, FAMOTIDINE, 20 MG: HCPCS | Performed by: FAMILY MEDICINE

## 2017-01-01 PROCEDURE — 87040 BLOOD CULTURE FOR BACTERIA: CPT | Mod: 59

## 2017-01-01 PROCEDURE — G8988 SELF CARE GOAL STATUS: HCPCS | Mod: CN

## 2017-01-01 PROCEDURE — 84157 ASSAY OF PROTEIN OTHER: CPT

## 2017-01-01 PROCEDURE — 86780 TREPONEMA PALLIDUM: CPT

## 2017-01-01 PROCEDURE — 94799 UNLISTED PULMONARY SVC/PX: CPT

## 2017-01-01 PROCEDURE — 87205 SMEAR GRAM STAIN: CPT

## 2017-01-01 PROCEDURE — 86704 HEP B CORE ANTIBODY TOTAL: CPT

## 2017-01-01 PROCEDURE — 99285 EMERGENCY DEPT VISIT HI MDM: CPT | Mod: 25

## 2017-01-01 PROCEDURE — 93005 ELECTROCARDIOGRAM TRACING: CPT

## 2017-01-01 PROCEDURE — 82962 GLUCOSE BLOOD TEST: CPT

## 2017-01-01 PROCEDURE — 83036 HEMOGLOBIN GLYCOSYLATED A1C: CPT

## 2017-01-01 PROCEDURE — 84145 PROCALCITONIN (PCT): CPT

## 2017-01-01 PROCEDURE — 97110 THERAPEUTIC EXERCISES: CPT

## 2017-01-01 PROCEDURE — G8988 SELF CARE GOAL STATUS: HCPCS | Mod: CJ

## 2017-01-01 PROCEDURE — 84439 ASSAY OF FREE THYROXINE: CPT

## 2017-01-01 PROCEDURE — S0039 INJECTION, SULFAMETHOXAZOLE: HCPCS | Performed by: EMERGENCY MEDICINE

## 2017-01-01 PROCEDURE — 82550 ASSAY OF CK (CPK): CPT

## 2017-01-01 PROCEDURE — 87340 HEPATITIS B SURFACE AG IA: CPT

## 2017-01-01 PROCEDURE — S0028 INJECTION, FAMOTIDINE, 20 MG: HCPCS | Performed by: SPECIALIST

## 2017-01-01 PROCEDURE — 82533 TOTAL CORTISOL: CPT

## 2017-01-01 PROCEDURE — G8980 MOBILITY D/C STATUS: HCPCS | Mod: CN

## 2017-01-01 PROCEDURE — 97167 OT EVAL HIGH COMPLEX 60 MIN: CPT

## 2017-01-01 PROCEDURE — 96366 THER/PROPH/DIAG IV INF ADDON: CPT

## 2017-01-01 PROCEDURE — 83605 ASSAY OF LACTIC ACID: CPT

## 2017-01-01 PROCEDURE — 97802 MEDICAL NUTRITION INDIV IN: CPT

## 2017-01-01 PROCEDURE — 96372 THER/PROPH/DIAG INJ SC/IM: CPT

## 2017-01-01 PROCEDURE — 25000242 PHARM REV CODE 250 ALT 637 W/ HCPCS: Performed by: NURSE PRACTITIONER

## 2017-01-01 PROCEDURE — 97163 PT EVAL HIGH COMPLEX 45 MIN: CPT

## 2017-01-01 PROCEDURE — 87901 NFCT AGT GNTYP ALYS HIV1 REV: CPT

## 2017-01-01 PROCEDURE — P9047 ALBUMIN (HUMAN), 25%, 50ML: HCPCS | Performed by: NURSE PRACTITIONER

## 2017-01-01 PROCEDURE — 82272 OCCULT BLD FECES 1-3 TESTS: CPT

## 2017-01-01 PROCEDURE — 80053 COMPREHEN METABOLIC PANEL: CPT | Mod: 91

## 2017-01-01 PROCEDURE — 86593 SYPHILIS TEST NON-TREP QUANT: CPT

## 2017-01-01 PROCEDURE — 94761 N-INVAS EAR/PLS OXIMETRY MLT: CPT

## 2017-01-01 PROCEDURE — 63600175 PHARM REV CODE 636 W HCPCS: Performed by: PHYSICIAN ASSISTANT

## 2017-01-01 PROCEDURE — 93010 ELECTROCARDIOGRAM REPORT: CPT | Mod: ,,, | Performed by: INTERNAL MEDICINE

## 2017-01-01 PROCEDURE — 63600175 PHARM REV CODE 636 W HCPCS: Performed by: INTERNAL MEDICINE

## 2017-01-01 PROCEDURE — 87070 CULTURE OTHR SPECIMN AEROBIC: CPT

## 2017-01-01 PROCEDURE — 86361 T CELL ABSOLUTE COUNT: CPT

## 2017-01-01 PROCEDURE — 85730 THROMBOPLASTIN TIME PARTIAL: CPT

## 2017-01-01 PROCEDURE — 80329 ANALGESICS NON-OPIOID 1 OR 2: CPT

## 2017-01-01 PROCEDURE — 36600 WITHDRAWAL OF ARTERIAL BLOOD: CPT

## 2017-01-01 PROCEDURE — G8979 MOBILITY GOAL STATUS: HCPCS | Mod: CJ

## 2017-01-01 PROCEDURE — 87522 HEPATITIS C REVRS TRNSCRPJ: CPT

## 2017-01-01 PROCEDURE — 85610 PROTHROMBIN TIME: CPT

## 2017-01-01 PROCEDURE — 85379 FIBRIN DEGRADATION QUANT: CPT

## 2017-01-01 PROCEDURE — 86592 SYPHILIS TEST NON-TREP QUAL: CPT

## 2017-01-01 PROCEDURE — G8978 MOBILITY CURRENT STATUS: HCPCS | Mod: CN

## 2017-01-01 PROCEDURE — 84443 ASSAY THYROID STIM HORMONE: CPT

## 2017-01-01 PROCEDURE — 96361 HYDRATE IV INFUSION ADD-ON: CPT

## 2017-01-01 PROCEDURE — 85027 COMPLETE CBC AUTOMATED: CPT

## 2017-01-01 PROCEDURE — 99233 SBSQ HOSP IP/OBS HIGH 50: CPT | Mod: ,,, | Performed by: INTERNAL MEDICINE

## 2017-01-01 PROCEDURE — 97530 THERAPEUTIC ACTIVITIES: CPT

## 2017-01-01 PROCEDURE — 87536 HIV-1 QUANT&REVRSE TRNSCRPJ: CPT

## 2017-01-01 PROCEDURE — G8989 SELF CARE D/C STATUS: HCPCS | Mod: CN

## 2017-01-01 PROCEDURE — 99223 1ST HOSP IP/OBS HIGH 75: CPT | Mod: ,,, | Performed by: INTERNAL MEDICINE

## 2017-01-01 PROCEDURE — 31720 CLEARANCE OF AIRWAYS: CPT

## 2017-01-01 PROCEDURE — 97803 MED NUTRITION INDIV SUBSEQ: CPT

## 2017-01-01 PROCEDURE — 82803 BLOOD GASES ANY COMBINATION: CPT

## 2017-01-01 PROCEDURE — 83690 ASSAY OF LIPASE: CPT

## 2017-01-01 PROCEDURE — 82945 GLUCOSE OTHER FLUID: CPT

## 2017-01-01 PROCEDURE — 87075 CULTR BACTERIA EXCEPT BLOOD: CPT

## 2017-01-01 PROCEDURE — 25000242 PHARM REV CODE 250 ALT 637 W/ HCPCS: Performed by: EMERGENCY MEDICINE

## 2017-01-01 PROCEDURE — 25000003 PHARM REV CODE 250: Performed by: SPECIALIST

## 2017-01-01 PROCEDURE — 86706 HEP B SURFACE ANTIBODY: CPT

## 2017-01-01 PROCEDURE — 11000001 HC ACUTE MED/SURG PRIVATE ROOM

## 2017-01-01 PROCEDURE — 89051 BODY FLUID CELL COUNT: CPT

## 2017-01-01 PROCEDURE — 99291 CRITICAL CARE FIRST HOUR: CPT | Mod: 25

## 2017-01-01 PROCEDURE — 25500020 PHARM REV CODE 255: Performed by: EMERGENCY MEDICINE

## 2017-01-01 PROCEDURE — 99285 EMERGENCY DEPT VISIT HI MDM: CPT

## 2017-01-01 PROCEDURE — 83605 ASSAY OF LACTIC ACID: CPT | Mod: 91

## 2017-01-01 PROCEDURE — G8979 MOBILITY GOAL STATUS: HCPCS | Mod: CN

## 2017-01-01 PROCEDURE — 87086 URINE CULTURE/COLONY COUNT: CPT

## 2017-01-01 PROCEDURE — G8987 SELF CARE CURRENT STATUS: HCPCS | Mod: CN

## 2017-01-01 PROCEDURE — 89051 BODY FLUID CELL COUNT: CPT | Mod: 91

## 2017-01-01 PROCEDURE — 86580 TB INTRADERMAL TEST: CPT | Performed by: NURSE PRACTITIONER

## 2017-01-01 PROCEDURE — 96365 THER/PROPH/DIAG IV INF INIT: CPT

## 2017-01-01 PROCEDURE — 97535 SELF CARE MNGMENT TRAINING: CPT

## 2017-01-01 PROCEDURE — 87798 DETECT AGENT NOS DNA AMP: CPT

## 2017-01-01 RX ORDER — ASPIRIN 81 MG/1
81 TABLET ORAL DAILY
COMMUNITY
Start: 2016-11-30

## 2017-01-01 RX ORDER — NAPROXEN 500 MG/1
TABLET ORAL
Status: ON HOLD | COMMUNITY
Start: 2017-01-01 | End: 2017-01-01 | Stop reason: HOSPADM

## 2017-01-01 RX ORDER — GLUCAGON 1 MG
1 KIT INJECTION
Status: DISCONTINUED | OUTPATIENT
Start: 2017-01-01 | End: 2017-01-01 | Stop reason: HOSPADM

## 2017-01-01 RX ORDER — PANTOPRAZOLE SODIUM 40 MG/1
40 TABLET, DELAYED RELEASE ORAL DAILY
Status: DISCONTINUED | OUTPATIENT
Start: 2017-01-01 | End: 2017-01-01 | Stop reason: HOSPADM

## 2017-01-01 RX ORDER — AMOXICILLIN 500 MG/1
CAPSULE ORAL
Status: ON HOLD | COMMUNITY
Start: 2017-01-01 | End: 2017-01-01 | Stop reason: HOSPADM

## 2017-01-01 RX ORDER — CYCLOBENZAPRINE HCL 10 MG
10 TABLET ORAL NIGHTLY PRN
Status: DISCONTINUED | OUTPATIENT
Start: 2017-01-01 | End: 2017-01-01 | Stop reason: HOSPADM

## 2017-01-01 RX ORDER — AMLODIPINE BESYLATE 5 MG/1
5 TABLET ORAL DAILY
COMMUNITY
Start: 2017-01-01

## 2017-01-01 RX ORDER — SODIUM CHLORIDE 9 MG/ML
INJECTION, SOLUTION INTRAVENOUS CONTINUOUS
Status: DISCONTINUED | OUTPATIENT
Start: 2017-01-01 | End: 2017-01-01

## 2017-01-01 RX ORDER — ALBUTEROL SULFATE 2.5 MG/.5ML
2.5 SOLUTION RESPIRATORY (INHALATION)
Status: COMPLETED | OUTPATIENT
Start: 2017-01-01 | End: 2017-01-01

## 2017-01-01 RX ORDER — PREDNISONE 20 MG/1
TABLET ORAL
Qty: 41 TABLET | Refills: 0
Start: 2017-01-01

## 2017-01-01 RX ORDER — CEFTRIAXONE 2 G/1
2 INJECTION, POWDER, FOR SOLUTION INTRAMUSCULAR; INTRAVENOUS
Status: DISCONTINUED | OUTPATIENT
Start: 2017-01-01 | End: 2017-01-01

## 2017-01-01 RX ORDER — ENOXAPARIN SODIUM 100 MG/ML
40 INJECTION SUBCUTANEOUS EVERY 24 HOURS
Status: DISCONTINUED | OUTPATIENT
Start: 2017-01-01 | End: 2017-01-01

## 2017-01-01 RX ORDER — ACETAMINOPHEN 325 MG/1
650 TABLET ORAL EVERY 6 HOURS PRN
Status: DISCONTINUED | OUTPATIENT
Start: 2017-01-01 | End: 2017-01-01

## 2017-01-01 RX ORDER — LACTULOSE 10 G/15ML
30 SOLUTION ORAL EVERY 6 HOURS
Status: DISCONTINUED | OUTPATIENT
Start: 2017-01-01 | End: 2017-12-15 | Stop reason: HOSPADM

## 2017-01-01 RX ORDER — AZITHROMYCIN 250 MG/1
1200 TABLET, FILM COATED ORAL WEEKLY
Status: DISCONTINUED | OUTPATIENT
Start: 2017-01-01 | End: 2017-01-01

## 2017-01-01 RX ORDER — EMTRICITABINE AND TENOFOVIR DISOPROXIL FUMARATE 200; 300 MG/1; MG/1
1 TABLET, FILM COATED ORAL
COMMUNITY
Start: 2017-01-01 | End: 2017-01-01

## 2017-01-01 RX ORDER — LANOLIN ALCOHOL/MO/W.PET/CERES
400 CREAM (GRAM) TOPICAL 2 TIMES DAILY
Status: DISCONTINUED | OUTPATIENT
Start: 2017-01-01 | End: 2017-01-01 | Stop reason: HOSPADM

## 2017-01-01 RX ORDER — LANCETS
EACH MISCELLANEOUS
Status: ON HOLD | COMMUNITY
Start: 2016-11-30 | End: 2017-01-01 | Stop reason: HOSPADM

## 2017-01-01 RX ORDER — BACITRACIN ZINC 500 UNIT/G
1 OINTMENT IN PACKET (EA) TOPICAL
Status: ON HOLD | COMMUNITY
Start: 2017-01-01 | End: 2017-01-01 | Stop reason: HOSPADM

## 2017-01-01 RX ORDER — GLUCAGON 1 MG
1 KIT INJECTION
Status: DISCONTINUED | OUTPATIENT
Start: 2017-01-01 | End: 2017-12-15 | Stop reason: HOSPADM

## 2017-01-01 RX ORDER — LACTULOSE 10 G/15ML
30 SOLUTION ORAL
Status: COMPLETED | OUTPATIENT
Start: 2017-01-01 | End: 2017-01-01

## 2017-01-01 RX ORDER — NORTRIPTYLINE HYDROCHLORIDE 25 MG/1
25 CAPSULE ORAL NIGHTLY
Status: DISCONTINUED | OUTPATIENT
Start: 2017-01-01 | End: 2017-01-01 | Stop reason: HOSPADM

## 2017-01-01 RX ORDER — HYDROCORTISONE ACETATE 25 MG/1
25 SUPPOSITORY RECTAL 2 TIMES DAILY
Status: DISCONTINUED | OUTPATIENT
Start: 2017-01-01 | End: 2017-01-01 | Stop reason: HOSPADM

## 2017-01-01 RX ORDER — GABAPENTIN 400 MG/1
400 CAPSULE ORAL 3 TIMES DAILY
Status: DISCONTINUED | OUTPATIENT
Start: 2017-01-01 | End: 2017-01-01 | Stop reason: HOSPADM

## 2017-01-01 RX ORDER — IPRATROPIUM BROMIDE AND ALBUTEROL SULFATE 2.5; .5 MG/3ML; MG/3ML
3 SOLUTION RESPIRATORY (INHALATION) EVERY 6 HOURS
Status: DISCONTINUED | OUTPATIENT
Start: 2017-01-01 | End: 2017-12-15 | Stop reason: HOSPADM

## 2017-01-01 RX ORDER — IPRATROPIUM BROMIDE AND ALBUTEROL SULFATE 2.5; .5 MG/3ML; MG/3ML
3 SOLUTION RESPIRATORY (INHALATION)
Qty: 1 BOX | Refills: 0 | Status: SHIPPED | OUTPATIENT
Start: 2017-01-01 | End: 2018-11-15

## 2017-01-01 RX ORDER — ENALAPRIL MALEATE 20 MG/1
TABLET ORAL
COMMUNITY
Start: 2017-01-01

## 2017-01-01 RX ORDER — SODIUM,POTASSIUM PHOSPHATES 280-250MG
2 POWDER IN PACKET (EA) ORAL
Status: DISCONTINUED | OUTPATIENT
Start: 2017-01-01 | End: 2017-01-01 | Stop reason: HOSPADM

## 2017-01-01 RX ORDER — SULFAMETHOXAZOLE AND TRIMETHOPRIM 800; 160 MG/1; MG/1
1 TABLET ORAL 3 TIMES DAILY
Qty: 63 TABLET | Refills: 0 | Status: SHIPPED | OUTPATIENT
Start: 2017-01-01 | End: 2017-01-01 | Stop reason: HOSPADM

## 2017-01-01 RX ORDER — AZITHROMYCIN 200 MG/5ML
1200 POWDER, FOR SUSPENSION ORAL WEEKLY
Status: DISCONTINUED | OUTPATIENT
Start: 2017-01-01 | End: 2017-01-01 | Stop reason: HOSPADM

## 2017-01-01 RX ORDER — IBUPROFEN 200 MG
24 TABLET ORAL
Status: DISCONTINUED | OUTPATIENT
Start: 2017-01-01 | End: 2017-01-01 | Stop reason: HOSPADM

## 2017-01-01 RX ORDER — IBUPROFEN 200 MG
16 TABLET ORAL
Status: DISCONTINUED | OUTPATIENT
Start: 2017-01-01 | End: 2017-01-01 | Stop reason: HOSPADM

## 2017-01-01 RX ORDER — AZITHROMYCIN 200 MG/5ML
1200 POWDER, FOR SUSPENSION ORAL WEEKLY
Qty: 1 BOTTLE | Refills: 0 | Status: SHIPPED | OUTPATIENT
Start: 2017-01-01 | End: 2017-01-01

## 2017-01-01 RX ORDER — IPRATROPIUM BROMIDE AND ALBUTEROL SULFATE 2.5; .5 MG/3ML; MG/3ML
3 SOLUTION RESPIRATORY (INHALATION) EVERY 4 HOURS PRN
Status: DISCONTINUED | OUTPATIENT
Start: 2017-01-01 | End: 2017-01-01 | Stop reason: HOSPADM

## 2017-01-01 RX ORDER — ASPIRIN 81 MG/1
81 TABLET ORAL DAILY
Status: DISCONTINUED | OUTPATIENT
Start: 2017-01-01 | End: 2017-01-01

## 2017-01-01 RX ORDER — IBUPROFEN 200 MG
24 TABLET ORAL
Status: DISCONTINUED | OUTPATIENT
Start: 2017-01-01 | End: 2017-12-15 | Stop reason: HOSPADM

## 2017-01-01 RX ORDER — HEPARIN SODIUM 5000 [USP'U]/ML
5000 INJECTION, SOLUTION INTRAVENOUS; SUBCUTANEOUS EVERY 8 HOURS
Status: DISCONTINUED | OUTPATIENT
Start: 2017-01-01 | End: 2017-01-01 | Stop reason: HOSPADM

## 2017-01-01 RX ORDER — TENOFOVIR DISOPROXIL FUMARATE 300 MG/1
300 TABLET, FILM COATED ORAL DAILY
Status: DISCONTINUED | OUTPATIENT
Start: 2017-01-01 | End: 2017-01-01

## 2017-01-01 RX ORDER — OMEPRAZOLE 40 MG/1
CAPSULE, DELAYED RELEASE ORAL
COMMUNITY
Start: 2017-01-01

## 2017-01-01 RX ORDER — NORTRIPTYLINE HYDROCHLORIDE 10 MG/1
20 CAPSULE ORAL NIGHTLY
Status: DISCONTINUED | OUTPATIENT
Start: 2017-01-01 | End: 2017-01-01

## 2017-01-01 RX ORDER — IBUPROFEN 200 MG
16 TABLET ORAL
Status: DISCONTINUED | OUTPATIENT
Start: 2017-01-01 | End: 2017-12-15 | Stop reason: HOSPADM

## 2017-01-01 RX ORDER — CHLORHEXIDINE GLUCONATE ORAL RINSE 1.2 MG/ML
SOLUTION DENTAL
Status: ON HOLD | COMMUNITY
Start: 2017-01-01 | End: 2017-01-01 | Stop reason: HOSPADM

## 2017-01-01 RX ORDER — ALBUTEROL SULFATE 2.5 MG/.5ML
2.5 SOLUTION RESPIRATORY (INHALATION) EVERY 4 HOURS
Status: CANCELLED | OUTPATIENT
Start: 2017-01-01

## 2017-01-01 RX ORDER — SULFAMETHOXAZOLE AND TRIMETHOPRIM 400; 80 MG/1; MG/1
2 TABLET ORAL 3 TIMES DAILY
Status: DISCONTINUED | OUTPATIENT
Start: 2017-01-01 | End: 2017-01-01 | Stop reason: SDUPTHER

## 2017-01-01 RX ORDER — PREDNISONE 20 MG/1
TABLET ORAL
Qty: 41 TABLET | Refills: 0 | Status: SHIPPED | OUTPATIENT
Start: 2017-01-01 | End: 2017-01-01

## 2017-01-01 RX ORDER — EMTRICITABINE 200 MG/1
200 CAPSULE ORAL DAILY
Status: DISCONTINUED | OUTPATIENT
Start: 2017-01-01 | End: 2017-12-15 | Stop reason: HOSPADM

## 2017-01-01 RX ORDER — INSULIN ASPART 100 [IU]/ML
0-5 INJECTION, SOLUTION INTRAVENOUS; SUBCUTANEOUS
Status: DISCONTINUED | OUTPATIENT
Start: 2017-01-01 | End: 2017-01-01 | Stop reason: HOSPADM

## 2017-01-01 RX ORDER — CYCLOBENZAPRINE HCL 10 MG
10 TABLET ORAL DAILY
COMMUNITY

## 2017-01-01 RX ORDER — METHYLPREDNISOLONE SOD SUCC 125 MG
125 VIAL (EA) INJECTION ONCE
Status: COMPLETED | OUTPATIENT
Start: 2017-01-01 | End: 2017-01-01

## 2017-01-01 RX ORDER — PREDNISONE 20 MG/1
40 TABLET ORAL DAILY
Status: DISCONTINUED | OUTPATIENT
Start: 2017-01-01 | End: 2017-01-01 | Stop reason: HOSPADM

## 2017-01-01 RX ORDER — GABAPENTIN 800 MG/1
800 TABLET ORAL DAILY
COMMUNITY
Start: 2017-01-01 | End: 2018-04-26

## 2017-01-01 RX ORDER — FAMOTIDINE 10 MG/ML
20 INJECTION INTRAVENOUS EVERY 12 HOURS
Status: DISCONTINUED | OUTPATIENT
Start: 2017-01-01 | End: 2017-01-01

## 2017-01-01 RX ORDER — SODIUM CHLORIDE, SODIUM LACTATE, POTASSIUM CHLORIDE, CALCIUM CHLORIDE 600; 310; 30; 20 MG/100ML; MG/100ML; MG/100ML; MG/100ML
INJECTION, SOLUTION INTRAVENOUS CONTINUOUS
Status: DISCONTINUED | OUTPATIENT
Start: 2017-01-01 | End: 2017-01-01

## 2017-01-01 RX ORDER — CLARITHROMYCIN 500 MG/1
1250 TABLET, FILM COATED ORAL WEEKLY
Qty: 10 TABLET | Refills: 0 | Status: SHIPPED | OUTPATIENT
Start: 2017-01-01 | End: 2017-01-01 | Stop reason: HOSPADM

## 2017-01-01 RX ORDER — NORTRIPTYLINE HYDROCHLORIDE 10 MG/1
CAPSULE ORAL
COMMUNITY
Start: 2017-01-01

## 2017-01-01 RX ORDER — SYRINGE AND NEEDLE,INSULIN,1ML 31GX15/64"
SYRINGE, EMPTY DISPOSABLE MISCELLANEOUS
Status: ON HOLD | COMMUNITY
Start: 2017-01-01 | End: 2017-01-01 | Stop reason: HOSPADM

## 2017-01-01 RX ORDER — SULFAMETHOXAZOLE AND TRIMETHOPRIM 800; 160 MG/1; MG/1
1 TABLET ORAL 3 TIMES DAILY
Status: DISCONTINUED | OUTPATIENT
Start: 2017-01-01 | End: 2017-01-01

## 2017-01-01 RX ORDER — ALBUMIN HUMAN 250 G/1000ML
50 SOLUTION INTRAVENOUS ONCE
Status: COMPLETED | OUTPATIENT
Start: 2017-01-01 | End: 2017-01-01

## 2017-01-01 RX ORDER — FAMOTIDINE 20 MG/50ML
20 INJECTION, SOLUTION INTRAVENOUS 2 TIMES DAILY
Status: DISCONTINUED | OUTPATIENT
Start: 2017-01-01 | End: 2017-01-01

## 2017-01-01 RX ORDER — AMLODIPINE BESYLATE 5 MG/1
5 TABLET ORAL DAILY
Status: DISCONTINUED | OUTPATIENT
Start: 2017-01-01 | End: 2017-01-01 | Stop reason: HOSPADM

## 2017-01-01 RX ORDER — AZITHROMYCIN 200 MG/5ML
1200 POWDER, FOR SUSPENSION ORAL WEEKLY
Qty: 1 BOTTLE | Refills: 0
Start: 2017-01-01

## 2017-01-01 RX ORDER — INSULIN ASPART 100 [IU]/ML
0-5 INJECTION, SOLUTION INTRAVENOUS; SUBCUTANEOUS
Status: DISCONTINUED | OUTPATIENT
Start: 2017-01-01 | End: 2017-12-15 | Stop reason: HOSPADM

## 2017-01-01 RX ORDER — EMTRICITABINE AND TENOFOVIR DISOPROXIL FUMARATE 200; 300 MG/1; MG/1
1 TABLET, FILM COATED ORAL DAILY
Status: DISCONTINUED | OUTPATIENT
Start: 2017-01-01 | End: 2017-01-01

## 2017-01-01 RX ORDER — EMTRICITABINE AND TENOFOVIR DISOPROXIL FUMARATE 200; 300 MG/1; MG/1
1 TABLET, FILM COATED ORAL DAILY
Qty: 30 TABLET | Refills: 0 | Status: SHIPPED | OUTPATIENT
Start: 2017-01-01

## 2017-01-01 RX ORDER — SULFAMETHOXAZOLE AND TRIMETHOPRIM 400; 80 MG/1; MG/1
2 TABLET ORAL 3 TIMES DAILY
Status: DISCONTINUED | OUTPATIENT
Start: 2017-01-01 | End: 2017-01-01 | Stop reason: HOSPADM

## 2017-01-01 RX ORDER — SODIUM,POTASSIUM PHOSPHATES 280-250MG
2 POWDER IN PACKET (EA) ORAL
Qty: 20 PACKET | Refills: 0 | Status: SHIPPED | OUTPATIENT
Start: 2017-01-01 | End: 2017-01-01

## 2017-01-01 RX ORDER — SULFAMETHOXAZOLE AND TRIMETHOPRIM 400; 80 MG/1; MG/1
2 TABLET ORAL 3 TIMES DAILY
Start: 2017-01-01

## 2017-01-01 RX ORDER — ENALAPRIL MALEATE 20 MG/1
20 TABLET ORAL
Status: ON HOLD | COMMUNITY
Start: 2017-01-01 | End: 2017-01-01 | Stop reason: HOSPADM

## 2017-01-01 RX ORDER — FAMOTIDINE 20 MG/50ML
20 INJECTION, SOLUTION INTRAVENOUS DAILY
Status: DISCONTINUED | OUTPATIENT
Start: 2017-01-01 | End: 2017-12-15 | Stop reason: HOSPADM

## 2017-01-01 RX ORDER — PRAVASTATIN SODIUM 20 MG/1
TABLET ORAL
COMMUNITY
Start: 2017-01-01

## 2017-01-01 RX ORDER — SYRINGE-NEEDLE,INSULIN,0.5 ML 27GX1/2"
SYRINGE, EMPTY DISPOSABLE MISCELLANEOUS
Status: ON HOLD | COMMUNITY
Start: 2016-11-30 | End: 2017-01-01 | Stop reason: HOSPADM

## 2017-01-01 RX ORDER — SULFAMETHOXAZOLE AND TRIMETHOPRIM 800; 160 MG/1; MG/1
1 TABLET ORAL DAILY
Status: DISCONTINUED | OUTPATIENT
Start: 2017-01-01 | End: 2017-12-15 | Stop reason: HOSPADM

## 2017-01-01 RX ADMIN — MAGNESIUM OXIDE TAB 400 MG (241.3 MG ELEMENTAL MG) 400 MG: 400 (241.3 MG) TAB at 08:11

## 2017-01-01 RX ADMIN — SULFAMETHOXAZOLE AND TRIMETHOPRIM 635 MG: 80; 16 INJECTION, SOLUTION, CONCENTRATE INTRAVENOUS at 04:11

## 2017-01-01 RX ADMIN — SULFAMETHOXAZOLE AND TRIMETHOPRIM 1 TABLET: 800; 160 TABLET ORAL at 06:12

## 2017-01-01 RX ADMIN — AMLODIPINE BESYLATE 5 MG: 5 TABLET ORAL at 09:11

## 2017-01-01 RX ADMIN — PANTOPRAZOLE SODIUM 40 MG: 40 TABLET, DELAYED RELEASE ORAL at 09:11

## 2017-01-01 RX ADMIN — NORTRIPTYLINE HYDROCHLORIDE 25 MG: 25 CAPSULE ORAL at 09:11

## 2017-01-01 RX ADMIN — HYDROCORTISONE ACETATE 25 MG: 25 SUPPOSITORY RECTAL at 08:11

## 2017-01-01 RX ADMIN — DEXTROSE 20 MILLION UNITS: 5 SOLUTION INTRAVENOUS at 03:11

## 2017-01-01 RX ADMIN — FAMOTIDINE 20 MG: 20 INJECTION, SOLUTION INTRAVENOUS at 10:12

## 2017-01-01 RX ADMIN — POTASSIUM & SODIUM PHOSPHATES POWDER PACK 280-160-250 MG 2 PACKET: 280-160-250 PACK at 04:11

## 2017-01-01 RX ADMIN — MULTIPLE VITAMINS W/ MINERALS TAB 1 TABLET: TAB at 10:11

## 2017-01-01 RX ADMIN — POTASSIUM & SODIUM PHOSPHATES POWDER PACK 280-160-250 MG 2 PACKET: 280-160-250 PACK at 05:11

## 2017-01-01 RX ADMIN — POTASSIUM & SODIUM PHOSPHATES POWDER PACK 280-160-250 MG 2 PACKET: 280-160-250 PACK at 03:11

## 2017-01-01 RX ADMIN — HYDROCORTISONE ACETATE 25 MG: 25 SUPPOSITORY RECTAL at 10:11

## 2017-01-01 RX ADMIN — IPRATROPIUM BROMIDE AND ALBUTEROL SULFATE 3 ML: .5; 3 SOLUTION RESPIRATORY (INHALATION) at 08:12

## 2017-01-01 RX ADMIN — MAGNESIUM OXIDE TAB 400 MG (241.3 MG ELEMENTAL MG) 400 MG: 400 (241.3 MG) TAB at 06:11

## 2017-01-01 RX ADMIN — MAGNESIUM OXIDE TAB 400 MG (241.3 MG ELEMENTAL MG) 400 MG: 400 (241.3 MG) TAB at 09:11

## 2017-01-01 RX ADMIN — PREDNISONE 40 MG: 20 TABLET ORAL at 08:11

## 2017-01-01 RX ADMIN — MAGNESIUM OXIDE TAB 400 MG (241.3 MG ELEMENTAL MG) 400 MG: 400 (241.3 MG) TAB at 10:11

## 2017-01-01 RX ADMIN — PENICILLIN G BENZATHINE 2.4 MILLION UNITS: 2400000 INJECTION, SUSPENSION INTRAMUSCULAR at 02:11

## 2017-01-01 RX ADMIN — LACTULOSE 30 G: 20 SOLUTION ORAL at 12:12

## 2017-01-01 RX ADMIN — FOLIC ACID: 5 INJECTION, SOLUTION INTRAMUSCULAR; INTRAVENOUS; SUBCUTANEOUS at 10:12

## 2017-01-01 RX ADMIN — PANTOPRAZOLE SODIUM 40 MG: 40 TABLET, DELAYED RELEASE ORAL at 10:11

## 2017-01-01 RX ADMIN — METHYLPREDNISOLONE SODIUM SUCCINATE 80 MG: 40 INJECTION, POWDER, FOR SOLUTION INTRAMUSCULAR; INTRAVENOUS at 06:11

## 2017-01-01 RX ADMIN — ENOXAPARIN SODIUM 40 MG: 100 INJECTION SUBCUTANEOUS at 04:11

## 2017-01-01 RX ADMIN — IPRATROPIUM BROMIDE AND ALBUTEROL SULFATE 3 ML: .5; 3 SOLUTION RESPIRATORY (INHALATION) at 12:12

## 2017-01-01 RX ADMIN — AZITHROMYCIN 1200 MG: 200 POWDER, FOR SUSPENSION ORAL at 12:11

## 2017-01-01 RX ADMIN — GABAPENTIN 400 MG: 400 CAPSULE ORAL at 06:11

## 2017-01-01 RX ADMIN — GABAPENTIN 400 MG: 400 CAPSULE ORAL at 10:11

## 2017-01-01 RX ADMIN — SULFAMETHOXAZOLE AND TRIMETHOPRIM 1 TABLET: 800; 160 TABLET ORAL at 02:12

## 2017-01-01 RX ADMIN — GABAPENTIN 400 MG: 400 CAPSULE ORAL at 09:11

## 2017-01-01 RX ADMIN — SULFAMETHOXAZOLE AND TRIMETHOPRIM 2 TABLET: 400; 80 TABLET ORAL at 05:11

## 2017-01-01 RX ADMIN — MULTIPLE VITAMINS W/ MINERALS TAB 1 TABLET: TAB at 08:11

## 2017-01-01 RX ADMIN — EMTRICITABINE 200 MG: 200 CAPSULE ORAL at 10:12

## 2017-01-01 RX ADMIN — HYDROCORTISONE ACETATE 25 MG: 25 SUPPOSITORY RECTAL at 09:11

## 2017-01-01 RX ADMIN — METHYLPREDNISOLONE SODIUM SUCCINATE 80 MG: 40 INJECTION, POWDER, FOR SOLUTION INTRAMUSCULAR; INTRAVENOUS at 01:11

## 2017-01-01 RX ADMIN — AMLODIPINE BESYLATE 5 MG: 5 TABLET ORAL at 08:11

## 2017-01-01 RX ADMIN — IPRATROPIUM BROMIDE AND ALBUTEROL SULFATE 3 ML: .5; 3 SOLUTION RESPIRATORY (INHALATION) at 01:12

## 2017-01-01 RX ADMIN — INSULIN ASPART 5 UNITS: 100 INJECTION, SOLUTION INTRAVENOUS; SUBCUTANEOUS at 05:11

## 2017-01-01 RX ADMIN — POTASSIUM & SODIUM PHOSPHATES POWDER PACK 280-160-250 MG 2 PACKET: 280-160-250 PACK at 10:11

## 2017-01-01 RX ADMIN — INSULIN ASPART 2 UNITS: 100 INJECTION, SOLUTION INTRAVENOUS; SUBCUTANEOUS at 11:11

## 2017-01-01 RX ADMIN — GABAPENTIN 400 MG: 400 CAPSULE ORAL at 05:11

## 2017-01-01 RX ADMIN — SULFAMETHOXAZOLE AND TRIMETHOPRIM 1 TABLET: 800; 160 TABLET ORAL at 01:12

## 2017-01-01 RX ADMIN — SULFAMETHOXAZOLE AND TRIMETHOPRIM 2 TABLET: 400; 80 TABLET ORAL at 10:11

## 2017-01-01 RX ADMIN — PANTOPRAZOLE SODIUM 40 MG: 40 TABLET, DELAYED RELEASE ORAL at 08:11

## 2017-01-01 RX ADMIN — TENOFOVIR DISOPROXIL FUMARATE 300 MG: 300 TABLET, COATED ORAL at 08:12

## 2017-01-01 RX ADMIN — FAMOTIDINE 20 MG: 20 INJECTION, SOLUTION INTRAVENOUS at 09:12

## 2017-01-01 RX ADMIN — GABAPENTIN 400 MG: 400 CAPSULE ORAL at 01:11

## 2017-01-01 RX ADMIN — SULFAMETHOXAZOLE AND TRIMETHOPRIM 2 TABLET: 400; 80 TABLET ORAL at 01:11

## 2017-01-01 RX ADMIN — DOLUTEGRAVIR SODIUM 50 MG: 50 TABLET, FILM COATED ORAL at 08:12

## 2017-01-01 RX ADMIN — ALBUMIN HUMAN 50 G: 0.25 SOLUTION INTRAVENOUS at 06:12

## 2017-01-01 RX ADMIN — POTASSIUM & SODIUM PHOSPHATES POWDER PACK 280-160-250 MG 2 PACKET: 280-160-250 PACK at 12:11

## 2017-01-01 RX ADMIN — POTASSIUM & SODIUM PHOSPHATES POWDER PACK 280-160-250 MG 2 PACKET: 280-160-250 PACK at 11:11

## 2017-01-01 RX ADMIN — INSULIN ASPART 2 UNITS: 100 INJECTION, SOLUTION INTRAVENOUS; SUBCUTANEOUS at 05:11

## 2017-01-01 RX ADMIN — METHYLPREDNISOLONE SODIUM SUCCINATE 80 MG: 40 INJECTION, POWDER, FOR SOLUTION INTRAMUSCULAR; INTRAVENOUS at 09:11

## 2017-01-01 RX ADMIN — CEFTRIAXONE SODIUM 2 G: 2 INJECTION, POWDER, FOR SOLUTION INTRAMUSCULAR; INTRAVENOUS at 06:12

## 2017-01-01 RX ADMIN — HEPARIN SODIUM 5000 UNITS: 5000 INJECTION, SOLUTION INTRAVENOUS; SUBCUTANEOUS at 06:11

## 2017-01-01 RX ADMIN — SULFAMETHOXAZOLE AND TRIMETHOPRIM 635 MG: 80; 16 INJECTION, SOLUTION, CONCENTRATE INTRAVENOUS at 01:11

## 2017-01-01 RX ADMIN — CEFTRIAXONE SODIUM 1 G: 1 INJECTION, POWDER, FOR SOLUTION INTRAMUSCULAR; INTRAVENOUS at 06:11

## 2017-01-01 RX ADMIN — LACTULOSE 30 G: 20 SOLUTION ORAL at 06:12

## 2017-01-01 RX ADMIN — EMTRICITABINE 200 MG: 200 CAPSULE ORAL at 09:12

## 2017-01-01 RX ADMIN — SULFAMETHOXAZOLE AND TRIMETHOPRIM 2 TABLET: 400; 80 TABLET ORAL at 06:11

## 2017-01-01 RX ADMIN — LACTULOSE 30 G: 20 SOLUTION ORAL at 05:12

## 2017-01-01 RX ADMIN — FAMOTIDINE 20 MG: 20 INJECTION, SOLUTION INTRAVENOUS at 08:12

## 2017-01-01 RX ADMIN — SULFAMETHOXAZOLE AND TRIMETHOPRIM 2 TABLET: 400; 80 TABLET ORAL at 09:11

## 2017-01-01 RX ADMIN — AMLODIPINE BESYLATE 5 MG: 5 TABLET ORAL at 10:11

## 2017-01-01 RX ADMIN — SULFAMETHOXAZOLE AND TRIMETHOPRIM 635 MG: 80; 16 INJECTION, SOLUTION, CONCENTRATE INTRAVENOUS at 08:11

## 2017-01-01 RX ADMIN — SULFAMETHOXAZOLE AND TRIMETHOPRIM 635 MG: 80; 16 INJECTION, SOLUTION, CONCENTRATE INTRAVENOUS at 06:11

## 2017-01-01 RX ADMIN — METHYLPREDNISOLONE SODIUM SUCCINATE 80 MG: 40 INJECTION, POWDER, FOR SOLUTION INTRAMUSCULAR; INTRAVENOUS at 12:11

## 2017-01-01 RX ADMIN — POTASSIUM & SODIUM PHOSPHATES POWDER PACK 280-160-250 MG 2 PACKET: 280-160-250 PACK at 09:11

## 2017-01-01 RX ADMIN — INSULIN ASPART 1 UNITS: 100 INJECTION, SOLUTION INTRAVENOUS; SUBCUTANEOUS at 11:11

## 2017-01-01 RX ADMIN — INSULIN ASPART 3 UNITS: 100 INJECTION, SOLUTION INTRAVENOUS; SUBCUTANEOUS at 04:11

## 2017-01-01 RX ADMIN — GABAPENTIN 400 MG: 400 CAPSULE ORAL at 02:11

## 2017-01-01 RX ADMIN — SULFAMETHOXAZOLE AND TRIMETHOPRIM 635 MG: 80; 16 INJECTION, SOLUTION, CONCENTRATE INTRAVENOUS at 12:11

## 2017-01-01 RX ADMIN — LACTULOSE 30 G: 20 SOLUTION ORAL at 11:12

## 2017-01-01 RX ADMIN — SULFAMETHOXAZOLE AND TRIMETHOPRIM 1 TABLET: 800; 160 TABLET ORAL at 10:12

## 2017-01-01 RX ADMIN — SULFAMETHOXAZOLE AND TRIMETHOPRIM 1 TABLET: 800; 160 TABLET ORAL at 09:12

## 2017-01-01 RX ADMIN — SULFAMETHOXAZOLE AND TRIMETHOPRIM 2 TABLET: 400; 80 TABLET ORAL at 08:11

## 2017-01-01 RX ADMIN — DEXTROSE 20 MILLION UNITS: 5 SOLUTION INTRAVENOUS at 02:11

## 2017-01-01 RX ADMIN — POTASSIUM & SODIUM PHOSPHATES POWDER PACK 280-160-250 MG 2 PACKET: 280-160-250 PACK at 06:11

## 2017-01-01 RX ADMIN — LACTULOSE 30 G: 20 SOLUTION ORAL at 02:12

## 2017-01-01 RX ADMIN — INSULIN DETEMIR 10 UNITS: 100 INJECTION, SOLUTION SUBCUTANEOUS at 08:11

## 2017-01-01 RX ADMIN — CYCLOBENZAPRINE HYDROCHLORIDE 10 MG: 10 TABLET, FILM COATED ORAL at 11:11

## 2017-01-01 RX ADMIN — PREDNISONE 40 MG: 20 TABLET ORAL at 10:11

## 2017-01-01 RX ADMIN — LORAZEPAM 2 MG: 2 INJECTION INTRAMUSCULAR; INTRAVENOUS at 10:12

## 2017-01-01 RX ADMIN — METHYLPREDNISOLONE SODIUM SUCCINATE 80 MG: 40 INJECTION, POWDER, FOR SOLUTION INTRAMUSCULAR; INTRAVENOUS at 05:11

## 2017-01-01 RX ADMIN — TENOFOVIR DISOPROXIL FUMARATE 300 MG: 300 TABLET, COATED ORAL at 09:12

## 2017-01-01 RX ADMIN — LACTULOSE 30 G: 20 SOLUTION ORAL at 07:12

## 2017-01-01 RX ADMIN — SULFAMETHOXAZOLE AND TRIMETHOPRIM 635 MG: 80; 16 INJECTION, SOLUTION, CONCENTRATE INTRAVENOUS at 10:11

## 2017-01-01 RX ADMIN — INSULIN ASPART 3 UNITS: 100 INJECTION, SOLUTION INTRAVENOUS; SUBCUTANEOUS at 05:11

## 2017-01-01 RX ADMIN — CEFTRIAXONE SODIUM 2 G: 2 INJECTION, POWDER, FOR SOLUTION INTRAMUSCULAR; INTRAVENOUS at 05:12

## 2017-01-01 RX ADMIN — TENOFOVIR DISOPROXIL FUMARATE 300 MG: 300 TABLET, COATED ORAL at 10:12

## 2017-01-01 RX ADMIN — SODIUM CHLORIDE, SODIUM LACTATE, POTASSIUM CHLORIDE, AND CALCIUM CHLORIDE: 600; 310; 30; 20 INJECTION, SOLUTION INTRAVENOUS at 08:12

## 2017-01-01 RX ADMIN — NORTRIPTYLINE HYDROCHLORIDE 25 MG: 25 CAPSULE ORAL at 10:11

## 2017-01-01 RX ADMIN — SODIUM CHLORIDE, SODIUM LACTATE, POTASSIUM CHLORIDE, AND CALCIUM CHLORIDE: 600; 310; 30; 20 INJECTION, SOLUTION INTRAVENOUS at 05:12

## 2017-01-01 RX ADMIN — SULFAMETHOXAZOLE AND TRIMETHOPRIM 635 MG: 80; 16 INJECTION, SOLUTION, CONCENTRATE INTRAVENOUS at 02:11

## 2017-01-01 RX ADMIN — METHYLPREDNISOLONE SODIUM SUCCINATE 80 MG: 40 INJECTION, POWDER, FOR SOLUTION INTRAMUSCULAR; INTRAVENOUS at 10:11

## 2017-01-01 RX ADMIN — MULTIPLE VITAMINS W/ MINERALS TAB 1 TABLET: TAB at 09:11

## 2017-01-01 RX ADMIN — SODIUM CHLORIDE: 0.9 INJECTION, SOLUTION INTRAVENOUS at 10:11

## 2017-01-01 RX ADMIN — IPRATROPIUM BROMIDE AND ALBUTEROL SULFATE 3 ML: .5; 3 SOLUTION RESPIRATORY (INHALATION) at 07:12

## 2017-01-01 RX ADMIN — SULFAMETHOXAZOLE AND TRIMETHOPRIM 635 MG: 80; 16 INJECTION, SOLUTION, CONCENTRATE INTRAVENOUS at 09:11

## 2017-01-01 RX ADMIN — METHYLPREDNISOLONE SODIUM SUCCINATE 125 MG: 125 INJECTION, POWDER, FOR SOLUTION INTRAMUSCULAR; INTRAVENOUS at 09:11

## 2017-01-01 RX ADMIN — SULFAMETHOXAZOLE AND TRIMETHOPRIM 2 TABLET: 400; 80 TABLET ORAL at 03:11

## 2017-01-01 RX ADMIN — TUBERCULIN PURIFIED PROTEIN DERIVATIVE 5 UNITS: 5 INJECTION, SOLUTION INTRADERMAL at 06:11

## 2017-01-01 RX ADMIN — INSULIN ASPART 1 UNITS: 100 INJECTION, SOLUTION INTRAVENOUS; SUBCUTANEOUS at 09:11

## 2017-01-01 RX ADMIN — AZITHROMYCIN 1200 MG: 200 POWDER, FOR SUSPENSION ORAL at 04:11

## 2017-01-01 RX ADMIN — ASPIRIN 81 MG: 81 TABLET, COATED ORAL at 08:11

## 2017-01-01 RX ADMIN — SULFAMETHOXAZOLE AND TRIMETHOPRIM 2 TABLET: 400; 80 TABLET ORAL at 02:11

## 2017-01-01 RX ADMIN — INSULIN ASPART 3 UNITS: 100 INJECTION, SOLUTION INTRAVENOUS; SUBCUTANEOUS at 08:11

## 2017-01-01 RX ADMIN — SODIUM CHLORIDE, SODIUM LACTATE, POTASSIUM CHLORIDE, AND CALCIUM CHLORIDE: 600; 310; 30; 20 INJECTION, SOLUTION INTRAVENOUS at 10:12

## 2017-01-01 RX ADMIN — INSULIN ASPART 1 UNITS: 100 INJECTION, SOLUTION INTRAVENOUS; SUBCUTANEOUS at 10:11

## 2017-01-01 RX ADMIN — INSULIN ASPART 4 UNITS: 100 INJECTION, SOLUTION INTRAVENOUS; SUBCUTANEOUS at 11:11

## 2017-01-01 RX ADMIN — FOLIC ACID: 5 INJECTION, SOLUTION INTRAMUSCULAR; INTRAVENOUS; SUBCUTANEOUS at 10:11

## 2017-01-01 RX ADMIN — DOLUTEGRAVIR SODIUM 50 MG: 50 TABLET, FILM COATED ORAL at 10:12

## 2017-01-01 RX ADMIN — HYDROCORTISONE ACETATE 25 MG: 25 SUPPOSITORY RECTAL at 12:11

## 2017-01-01 RX ADMIN — SULFAMETHOXAZOLE AND TRIMETHOPRIM 1 TABLET: 800; 160 TABLET ORAL at 05:12

## 2017-01-01 RX ADMIN — EMTRICITABINE 200 MG: 200 CAPSULE ORAL at 08:12

## 2017-01-01 RX ADMIN — IOHEXOL 100 ML: 350 INJECTION, SOLUTION INTRAVENOUS at 11:11

## 2017-01-01 RX ADMIN — PREDNISONE 40 MG: 20 TABLET ORAL at 02:11

## 2017-01-01 RX ADMIN — SODIUM POLYSTYRENE SULFONATE 15 G: 15 SUSPENSION ORAL; RECTAL at 07:12

## 2017-01-01 RX ADMIN — LACTULOSE 30 G: 20 SOLUTION ORAL at 01:12

## 2017-01-01 RX ADMIN — ENOXAPARIN SODIUM 40 MG: 100 INJECTION SUBCUTANEOUS at 10:11

## 2017-01-01 RX ADMIN — ASPIRIN 81 MG: 81 TABLET, COATED ORAL at 09:11

## 2017-01-01 RX ADMIN — METHYLPREDNISOLONE SODIUM SUCCINATE 80 MG: 40 INJECTION, POWDER, FOR SOLUTION INTRAMUSCULAR; INTRAVENOUS at 02:11

## 2017-01-01 RX ADMIN — SULFAMETHOXAZOLE AND TRIMETHOPRIM 1 TABLET: 800; 160 TABLET ORAL at 08:12

## 2017-01-01 RX ADMIN — FAMOTIDINE 20 MG: 10 INJECTION, SOLUTION INTRAVENOUS at 08:12

## 2017-01-01 RX ADMIN — NORTRIPTYLINE HYDROCHLORIDE 25 MG: 25 CAPSULE ORAL at 08:11

## 2017-01-01 RX ADMIN — INSULIN ASPART 4 UNITS: 100 INJECTION, SOLUTION INTRAVENOUS; SUBCUTANEOUS at 05:11

## 2017-01-01 RX ADMIN — SULFAMETHOXAZOLE AND TRIMETHOPRIM 2 TABLET: 400; 80 TABLET ORAL at 04:11

## 2017-01-01 RX ADMIN — SULFAMETHOXAZOLE AND TRIMETHOPRIM 635 MG: 80; 16 INJECTION, SOLUTION, CONCENTRATE INTRAVENOUS at 05:11

## 2017-01-01 RX ADMIN — PREDNISONE 40 MG: 20 TABLET ORAL at 09:11

## 2017-01-01 RX ADMIN — HEPARIN SODIUM 5000 UNITS: 5000 INJECTION, SOLUTION INTRAVENOUS; SUBCUTANEOUS at 02:11

## 2017-01-01 RX ADMIN — INSULIN ASPART 2 UNITS: 100 INJECTION, SOLUTION INTRAVENOUS; SUBCUTANEOUS at 12:11

## 2017-01-01 RX ADMIN — SODIUM CHLORIDE 2859 ML: 0.9 INJECTION, SOLUTION INTRAVENOUS at 04:11

## 2017-01-01 RX ADMIN — SULFAMETHOXAZOLE AND TRIMETHOPRIM 1 TABLET: 800; 160 TABLET ORAL at 07:12

## 2017-01-01 RX ADMIN — ACETAMINOPHEN 650 MG: 325 TABLET ORAL at 10:11

## 2017-01-01 RX ADMIN — GABAPENTIN 400 MG: 400 CAPSULE ORAL at 03:11

## 2017-01-01 RX ADMIN — SODIUM CHLORIDE 1000 ML: 0.9 INJECTION, SOLUTION INTRAVENOUS at 12:12

## 2017-01-01 RX ADMIN — ALBUTEROL SULFATE 2.5 MG: 2.5 SOLUTION RESPIRATORY (INHALATION) at 07:11

## 2017-01-01 RX ADMIN — ALBUTEROL SULFATE 2.5 MG: 2.5 SOLUTION RESPIRATORY (INHALATION) at 06:11

## 2017-01-01 RX ADMIN — INSULIN ASPART 3 UNITS: 100 INJECTION, SOLUTION INTRAVENOUS; SUBCUTANEOUS at 06:11

## 2017-01-01 RX ADMIN — INSULIN ASPART 2 UNITS: 100 INJECTION, SOLUTION INTRAVENOUS; SUBCUTANEOUS at 06:12

## 2017-01-01 RX ADMIN — CEFTRIAXONE SODIUM 2 G: 2 INJECTION, POWDER, FOR SOLUTION INTRAMUSCULAR; INTRAVENOUS at 04:12

## 2017-01-01 RX ADMIN — ENOXAPARIN SODIUM 40 MG: 100 INJECTION SUBCUTANEOUS at 05:11

## 2017-01-01 RX ADMIN — INSULIN ASPART 2 UNITS: 100 INJECTION, SOLUTION INTRAVENOUS; SUBCUTANEOUS at 11:12

## 2017-01-01 RX ADMIN — DOLUTEGRAVIR SODIUM 50 MG: 50 TABLET, FILM COATED ORAL at 09:12

## 2017-11-02 NOTE — ED PROVIDER NOTES
SCRIBE #1 NOTE: I, Salena Grewal, am scribing for, and in the presence of, Lizet Denton MD. I have scribed the entire note.      History      Chief Complaint   Patient presents with    Fall     reports has been having issues with insomnia and took his sisters ambien and fell out of the bed and was found on the floor this morning, c/o left hip and left rib pain        Review of patient's allergies indicates:  No Known Allergies     HPI   HPI    11/1/2017, 9:03 PM   History obtained from the patient and children      History of Present Illness: Jeffrey Rosales is a 66 y.o. male patient with PMHx of DM and HTN who presents to the Emergency Department for L hip pain which onset suddenly after pt fell out of the bed in his sleep. Pt reports taking Ambien for the first time last PM. Symptoms are constant and moderate in severity. No mitigating or exacerbating factors reported. Pt's children state they found pt on the floor by his bed at approximately 1200 today. Associated sxs include L rib pain and a cough x2 weeks. Patient denies any head trauma, dizziness, LOC, extremity weakness/numbness, abd pain, back pain, neck pain/stiffnesss, CP, SOB, n/v, knee pain, HA, and all other sxs at this time. No further complaints or concerns at this time.       Arrival mode: Personal vehicle      PCP: Not given      Past Medical History:  Past Medical History:   Diagnosis Date    Diabetes mellitus     Hypertension        Past Surgical History:  History reviewed. No pertinent surgical history.      Family History:  History reviewed. No pertinent family history.    Social History:  Social History     Social History Main Topics    Smoking status: Former Smoker    Smokeless tobacco: unknown    Alcohol use No    Drug use: Unknown    Sexual activity: unknown       ROS   Review of Systems   Constitutional: Negative for chills and fever.   HENT: Negative for sore throat.    Respiratory: Positive for cough. Negative for shortness of  breath.    Cardiovascular: Negative for chest pain.   Gastrointestinal: Negative for abdominal pain, nausea and vomiting.   Genitourinary: Negative for dysuria.   Musculoskeletal: Positive for arthralgias (L hip) and myalgias (L ribs). Negative for back pain, neck pain and neck stiffness.        (-) knee pain   Skin: Negative for rash.   Neurological: Negative for dizziness, weakness, numbness and headaches.        (-) head trauma  (-) LOC   Hematological: Does not bruise/bleed easily.   All other systems reviewed and are negative.      Physical Exam      Initial Vitals [11/01/17 2057]   BP Pulse Resp Temp SpO2   111/60 78 16 98.3 °F (36.8 °C) 98 %      MAP       77          Physical Exam  Nursing Notes and Vital Signs Reviewed.  Constitutional: Patient is in no acute distress. Well-developed and well-nourished.  Head: Atraumatic. Normocephalic.  Eyes: PERRL. EOM intact. Conjunctivae are not pale. No scleral icterus.  ENT: Mucous membranes are moist. Oropharynx is clear and symmetric.    Neck: Supple. Full ROM. No lymphadenopathy.  Cardiovascular: Regular rate. Regular rhythm. No murmurs, rubs, or gallops. Distal pulses are 2+ and symmetric.  Pulmonary/Chest: No respiratory distress. Clear to auscultation bilaterally. No wheezing, rales, or rhonchi.  Abdominal: Soft and non-distended.  There is no tenderness.  No rebound, guarding, or rigidity. Good bowel sounds.  Genitourinary: No CVA tenderness  Musculoskeletal: Tenderness to L anterior ribs. Moves all extremities. No obvious deformities. No edema. No calf tenderness.  LLE: Tenderness to L hip. no evident deformity. Negative for swelling. ROM is normal. Cap refill distally is <2 seconds. DP and PT pulses are equal and 2+ bilaterally. No motor deficit. No distal sensory deficit  Skin: Warm and dry.  Neurological:  Alert, awake, and appropriate.  Normal speech.  No acute focal neurological deficits are appreciated.  Psychiatric: Normal affect. Good eye contact.  "Appropriate in content.    ED Course    Procedures  ED Vital Signs:  Vitals:    11/01/17 2057 11/01/17 2105 11/01/17 2343   BP: 111/60  124/65   Pulse: 78  86   Resp: 16     Temp: 98.3 °F (36.8 °C)     TempSrc: Oral     SpO2: 98%  98%   Weight:  93.9 kg (207 lb)    Height: 6' 1" (1.854 m)         Abnormal Lab Results:  Labs Reviewed   CBC W/ AUTO DIFFERENTIAL - Abnormal; Notable for the following:        Result Value    WBC 3.39 (*)     RBC 3.93 (*)     Hemoglobin 12.6 (*)     Hematocrit 35.9 (*)     MCH 32.1 (*)     Platelets 92 (*)     Gran # 1.7 (*)     All other components within normal limits   COMPREHENSIVE METABOLIC PANEL - Abnormal; Notable for the following:     Sodium 135 (*)     Glucose 118 (*)     Albumin 2.4 (*)     Total Bilirubin 1.1 (*)      (*)     ALT 45 (*)     Anion Gap 4 (*)     All other components within normal limits   APTT - Abnormal; Notable for the following:     aPTT 32.7 (*)     All other components within normal limits   CK - Abnormal; Notable for the following:     CPK 1010 (*)     All other components within normal limits   URINALYSIS - Abnormal; Notable for the following:     Protein, UA Trace (*)     Bilirubin (UA) 1+ (*)     Urobilinogen, UA >=8.0 (*)     All other components within normal limits   D DIMER, QUANTITATIVE - Abnormal; Notable for the following:     D-Dimer 1.45 (*)     All other components within normal limits   PROTIME-INR   TROPONIN I   MAGNESIUM   TSH        All Lab Results:  Results for orders placed or performed during the hospital encounter of 11/01/17   CBC auto differential   Result Value Ref Range    WBC 3.39 (L) 3.90 - 12.70 K/uL    RBC 3.93 (L) 4.60 - 6.20 M/uL    Hemoglobin 12.6 (L) 14.0 - 18.0 g/dL    Hematocrit 35.9 (L) 40.0 - 54.0 %    MCV 91 82 - 98 fL    MCH 32.1 (H) 27.0 - 31.0 pg    MCHC 35.1 32.0 - 36.0 g/dL    RDW 13.9 11.5 - 14.5 %    Platelets 92 (L) 150 - 350 K/uL    MPV 10.1 9.2 - 12.9 fL    Gran # 1.7 (L) 1.8 - 7.7 K/uL    Lymph # 1.2 " 1.0 - 4.8 K/uL    Mono # 0.3 0.3 - 1.0 K/uL    Eos # 0.1 0.0 - 0.5 K/uL    Baso # 0.02 0.00 - 0.20 K/uL    Gran% 50.5 38.0 - 73.0 %    Lymph% 35.7 18.0 - 48.0 %    Mono% 9.7 4.0 - 15.0 %    Eosinophil% 3.5 0.0 - 8.0 %    Basophil% 0.6 0.0 - 1.9 %    Differential Method Automated    Comprehensive metabolic panel   Result Value Ref Range    Sodium 135 (L) 136 - 145 mmol/L    Potassium 3.9 3.5 - 5.1 mmol/L    Chloride 107 95 - 110 mmol/L    CO2 24 23 - 29 mmol/L    Glucose 118 (H) 70 - 110 mg/dL    BUN, Bld 11 8 - 23 mg/dL    Creatinine 0.7 0.5 - 1.4 mg/dL    Calcium 9.5 8.7 - 10.5 mg/dL    Total Protein 7.1 6.0 - 8.4 g/dL    Albumin 2.4 (L) 3.5 - 5.2 g/dL    Total Bilirubin 1.1 (H) 0.1 - 1.0 mg/dL    Alkaline Phosphatase 104 55 - 135 U/L     (H) 10 - 40 U/L    ALT 45 (H) 10 - 44 U/L    Anion Gap 4 (L) 8 - 16 mmol/L    eGFR if African American >60 >60 mL/min/1.73 m^2    eGFR if non African American >60 >60 mL/min/1.73 m^2   Protime-INR   Result Value Ref Range    Prothrombin Time 12.2 9.0 - 12.5 sec    INR 1.2 0.8 - 1.2   APTT   Result Value Ref Range    aPTT 32.7 (H) 21.0 - 32.0 sec   CPK   Result Value Ref Range    CPK 1010 (H) 20 - 200 U/L   Urinalysis   Result Value Ref Range    Specimen UA Urine, Clean Catch     Color, UA Yellow Yellow, Straw, Tamar    Appearance, UA Clear Clear    pH, UA 6.0 5.0 - 8.0    Specific Gravity, UA 1.020 1.005 - 1.030    Protein, UA Trace (A) Negative    Glucose, UA Negative Negative    Ketones, UA Negative Negative    Bilirubin (UA) 1+ (A) Negative    Occult Blood UA Negative Negative    Nitrite, UA Negative Negative    Urobilinogen, UA >=8.0 (A) <2.0 EU/dL    Leukocytes, UA Negative Negative   Troponin I   Result Value Ref Range    Troponin I 0.021 0.000 - 0.026 ng/mL   Magnesium   Result Value Ref Range    Magnesium 1.6 1.6 - 2.6 mg/dL   D dimer, quantitative   Result Value Ref Range    D-Dimer 1.45 (H) <0.50 mg/L FEU   TSH   Result Value Ref Range    TSH 1.025 0.400 - 4.000  uIU/mL       Imaging Results:  Imaging Results          CTA Chest Non-Coronary (PE Study) (Final result)  Result time 11/02/17 00:00:05    Final result by Cas Mckeon Jr., MD (11/02/17 00:00:05)                 Impression:       1.  No pulmonary emboli.    2.  1.6 cm right lower lobe pulmonary nodule.    3.  Scattered pulmonary emphysema changes.    All CT scans at this facility use dose modulation, iterative reconstruction, and/or weight based dosing when appropriate to reduce radiation dose to as low as reasonably achievable.      Electronically signed by: CAS MCKEON MD  Date:     11/02/17  Time:    00:00              Narrative:    EXAM:   CTA CHEST NON CORONARY    CLINICAL HISTORY:  chest pain      COMPARISON: None    TECHNIQUE:  Post contrast axial images were obtained. Multiplanar thick Slab MIP reconstruction images were obtained and stored.  Omnipaque 350 was administered.    FINDINGS:    Angiogram findings:  Good  opacification of the pulmonary arterial system. No pulmonary emboli.    Pulmonary findings: No infiltrates or effusions.  1.6 cm noncalcified pulmonary nodule in the heel right lower lobe.  Neoplasm is not excluded, image 86 series 3.  No other pulmonary nodules.  Pulmonary emphysema change.  Scattered tiny bulla in the upper lung zones.  Small mediastinal lymph nodes.  Mild aortic and coronary artery calcifications.    No vertebral body fractures are evident.  No acute displaced rib fractures.                             X-Ray Ribs 2 View Left (Final result)  Result time 11/01/17 21:48:51    Final result by Cas Mckeon Jr., MD (11/01/17 21:48:51)                 Impression:          No acute findings.      Electronically signed by: CAS MCKEON MD  Date:     11/01/17  Time:    21:48              Narrative:    EXAM:   XR RIBS 2 VIEW LEFT    CLINICAL HISTORY:  Unspecified fall, initial encounter;  COMPARISON:  None    FINDINGS:   Rib findings: No rib fracture is seen.    Chest  findings: Heart size is normal.  Lungs appear clear. No pneumothorax is seen.                             X-Ray Hip 2 View Left (Final result)  Result time 11/01/17 21:48:01    Final result by Cas Mckeon Jr., MD (11/01/17 21:48:01)                 Impression:         No acute bone findings.       Electronically signed by: CAS MCKEON MD  Date:     11/01/17  Time:    21:48              Narrative:    EXAM:   XR HIP 2 VIEW LEFT    CLINICAL HISTORY:  fall   .    COMPARISON:  None    FINDINGS:   Bone alignment is satisfactory.  No acute fracture. No dislocation. No advanced arthritic change. No significant soft tissue findings.                             The EKG was ordered, reviewed, and independently interpreted by the ED provider.  Interpretation time: 21:28  Rate: 100 BPM  Rhythm: Sinus rhythm with occasional PVC's.   Interpretation: Nonspecific T wave abnormality. No STEMI.         The Emergency Provider reviewed the vital signs and test results, which are outlined above.    ED Discussion     12:37 AM: Reassessed pt at this time.  Pt states his condition has improved at this time. Discussed with pt all pertinent ED information and results. Discussed pt dx and plan of tx. Gave pt all f/u and return to the ED instructions. All questions and concerns were addressed at this time. Pt expresses understanding of information and instructions, and is comfortable with plan to discharge. Pt is stable for discharge.    Trauma precautions were discussed with patient and/or family/caretaker; I do not specifically detect any abdominal, thoracic, CNS, orthopedic, or other emergent or life threatening condition and that patient is safe to be discharged.  It was also discussed that despite an unrevealing examination and negative radiographic examination for serious or life threatening injury, these conditions may still exist.  As such, patient should return to ED immediately should they experience, severe or worsening  pain, shortness of breath, abdominal pain, headache, vomiting, or any other concern.  It was also discussed that not infrequently, injuries may not be diagnosed during the initial ED visit (such as fractures) and that if the patient discovers a new area of concern, a new area of injury that was not evaluated in the ED, they should return for evaluation as they may have an injury that requires treatment.      ED Medication(s):  Medications   omnipaque 350 iohexol 100 mL (100 mLs Intravenous Given 11/1/17 4002)       There are no discharge medications for this patient.      Follow-up Information     Shavonne Arndt MD In 2 days.    Specialty:  Family Medicine  Contact information:  [address] [facility] [address] [phone]             [facility]    Specialty:  Emergency Medicine  Why:  As needed, If symptoms worsen  Contact information:  [address] [facility] [address] [phone]                   Medical Decision Making    Medical Decision Making:   Clinical Tests:   Lab Tests: Ordered and Reviewed  Radiological Study: Reviewed and Ordered  Medical Tests: Reviewed and Ordered           Scribe Attestation:   Scribe #1: I performed the above scribed service and the documentation accurately describes the services I performed. I attest to the accuracy of the note.    Attending:   Physician Attestation Statement for Scribe #1: I, Lizet Denton MD, personally performed the services described in this documentation, as scribed by Salena Grewal, in my presence, and it is both accurate and complete.          Clinical Impression       ICD-10-CM ICD-9-CM   1. Weakness R53.1 780.79   2. Fall W19.XXXA E888.9       Disposition:   Disposition: Discharged  Condition: Stable         Lizet Denton MD  11/02/17 4024

## 2017-11-12 PROBLEM — R76.8 HEPATITIS B CORE ANTIBODY POSITIVE: Chronic | Status: ACTIVE | Noted: 2017-01-01

## 2017-11-12 PROBLEM — J18.9 PNEUMONIA DUE TO INFECTIOUS ORGANISM: Status: ACTIVE | Noted: 2017-01-01

## 2017-11-12 PROBLEM — R65.20 SEVERE SEPSIS: Status: ACTIVE | Noted: 2017-01-01

## 2017-11-12 PROBLEM — M51.36 LUMBAR DISC NARROWING: Chronic | Status: ACTIVE | Noted: 2017-01-01

## 2017-11-12 PROBLEM — M51.36 LUMBAR DISC NARROWING: Status: ACTIVE | Noted: 2017-01-01

## 2017-11-12 PROBLEM — I10 ESSENTIAL HYPERTENSION: Chronic | Status: ACTIVE | Noted: 2017-01-01

## 2017-11-12 PROBLEM — E11.9 TYPE 2 DIABETES MELLITUS: Chronic | Status: ACTIVE | Noted: 2017-01-01

## 2017-11-12 PROBLEM — B20 HIV DISEASE: Chronic | Status: ACTIVE | Noted: 2017-01-01

## 2017-11-12 PROBLEM — A53.9 SYPHILIS IN MALE: Status: ACTIVE | Noted: 2017-01-01

## 2017-11-12 PROBLEM — C21.0 MALIGNANT NEOPLASM OF ANUS: Chronic | Status: ACTIVE | Noted: 2017-01-01

## 2017-11-12 PROBLEM — E11.9 DIABETES MELLITUS: Status: ACTIVE | Noted: 2017-01-01

## 2017-11-12 PROBLEM — R76.8 HEPATITIS B CORE ANTIBODY POSITIVE: Status: ACTIVE | Noted: 2017-01-01

## 2017-11-12 PROBLEM — E66.9 OBESITY: Chronic | Status: ACTIVE | Noted: 2017-01-01

## 2017-11-12 PROBLEM — A41.9 SEVERE SEPSIS: Status: ACTIVE | Noted: 2017-01-01

## 2017-11-12 PROBLEM — C21.0 MALIGNANT NEOPLASM OF ANUS: Status: ACTIVE | Noted: 2017-01-01

## 2017-11-12 NOTE — ED NOTES
Patient's family reports fatigue and fever since last Wednesday that have not gotten any better. Family reports patient was seen last week for same complaint; also c/o 4-5 cm skin tear from blister to middle of back.    Patient moved to ED room 09 via wheelchair, patient assisted onto stretcher and changed into a gown. Patient placed on cardiac monitor, continuous pulse oximetry and automatic blood pressure cuff. Bed placed in low locked position, side rails up x 2, call light is within reach of patient or family, orientation to room and explanation of wait provided to family and patient, alarms set and turned on for monitor and pulse ox, awaiting MD evaluation and orders, will continue to monitor.    Patient identifies self as Jeffrey Rosales.      LOC: The patient is awake, alert and aware of environment with an appropriate affect, the patient is oriented x 3 and speaking appropriately.  APPEARANCE: Patient resting comfortably and in no acute distress, patient is clean and well groomed, patient's clothing is properly fastened.  SKIN: The skin is warm and dry, color consistent with ethnicity, patient has normal skin turgor and moist mucus membranes, skin tear to middle of back, no bruising noted.  MUSCULOSKELETAL: Patient c/o weakness but is moving all extremities well, no obvious swelling or deformities noted.  RESPIRATORY: Airway is open and patent, respirations are spontaneous, patient has a normal effort and rate, no accessory muscle use noted.  CARDIAC: Patient has a increased rate and sinus rhythm, swelling noted to bilateral feet, capillary refill < 3 seconds.  ABDOMEN: Soft and non tender to palpation, no distention noted.  NEUROLOGIC: PERRL, 3 mm bilaterally, eyes open spontaneously, behavior appropriate to situation, follows commands, facial expression symmetrical, bilateral hand grasp equal and even, purposeful motor response noted, normal sensation in all extremities when touched with a finger.

## 2017-11-12 NOTE — ED PROVIDER NOTES
SCRIBE #1 NOTE: I, Ximena Scales, am scribing for, and in the presence of, Lizet Denton MD. I have scribed the HPI, ROS, and PEx.     SCRIBE #2 NOTE: I, Ze Hooper, am scribing for, and in the presence of,  Chucho Garcia MD. I have scribed the remaining portions of the note not scribed by Scribe #1.     History      Chief Complaint   Patient presents with    Fatigue     increase weakness since wednesday.        Review of patient's allergies indicates:  No Known Allergies     HPI   HPI    11/12/2017, 3:45 PM   History obtained from the son and patient      History of Present Illness: Jeffrey Rosales is a 66 y.o. male patient who presents to the Emergency Department for generalized weakness which onset gradually 10 days ago. Symptoms are constant and worsening in severity. No mitigating or exacerbating factors reported. Associated sxs include fever and myalgia. Pt's son reports abrasion to midback with drainage. Son is unsure how the abrasion occurred. Patient denies any chills, HA, dizziness, CP, SOB, abd pain, N/V/D, dysuria, hematuria, and all other sxs at this time. No further complaints or concerns at this time.     Arrival mode: Personal vehicle      PCP: No primary care provider on file.       Past Medical History:  Past Medical History:   Diagnosis Date    Diabetes mellitus     HIV (human immunodeficiency virus infection)     Hypertension        Past Surgical History:  History reviewed. No pertinent surgical history.      Family History:  History reviewed. No pertinent family history.    Social History:  Social History     Social History Main Topics    Smoking status: Former Smoker    Smokeless tobacco: Not given    Alcohol use No    Drug use: Not given    Sexual activity: Not given       ROS   Review of Systems   Constitutional: Positive for fever. Negative for chills.   HENT: Negative for sore throat.    Respiratory: Negative for shortness of breath.    Cardiovascular: Negative for chest pain.    Gastrointestinal: Negative for abdominal pain, diarrhea, nausea and vomiting.   Genitourinary: Negative for dysuria and hematuria.   Musculoskeletal: Negative for back pain.   Skin: Positive for wound (abrasion midback). Negative for rash.   Neurological: Negative for dizziness, weakness (generalized) and headaches.   Hematological: Does not bruise/bleed easily.   All other systems reviewed and are negative.      Physical Exam      Initial Vitals [11/12/17 1527]   BP Pulse Resp Temp SpO2   130/70 (!) 113 18 (!) 100.6 °F (38.1 °C) (!) 92 %      MAP       90          Physical Exam  Nursing Notes and Vital Signs Reviewed.  Constitutional: Patient is in no acute distress. Well-developed and well-nourished.  Head: Atraumatic. Normocephalic.  Eyes: PERRL. EOM intact. Conjunctivae are not pale. No scleral icterus.  ENT: Mucous membranes are moist. Oropharynx is clear and symmetric.    Neck: Supple. Full ROM. No lymphadenopathy.  Cardiovascular: Tachycardic. Regular rhythm. No murmurs, rubs, or gallops. Distal pulses are 2+ and symmetric.  Pulmonary/Chest: No respiratory distress. Clear to auscultation bilaterally. No wheezing or rales.  Abdominal: Soft and non-distended. Umbilical hernia. There is no tenderness.  No rebound, guarding, or rigidity. Good bowel sounds.  Genitourinary: No CVA tenderness  Musculoskeletal: Moves all extremities. No obvious deformities. No edema. No calf tenderness.  Skin: Warm and dry. 4-5cm abrasion to midback.  Neurological: Somnolent, answers questions appropriately. Pupils ERRL and EOM normal. Cranial nerves II-XII are intact. Strength is full bilaterally; it is equal and 5/5 in bilateral upper. Unable to keep lower extremities up against gravity. There is no pronator drift of outstretched arms. Light touch sense is intact. Speech is clear and normal. No acute focal neurological deficits noted.  Psychiatric: Normal affect. Good eye contact. Appropriate in content.    ED Course    Critical  "Care  Date/Time: 11/12/2017 7:13 PM  Performed by: ANA ERICKSON  Authorized by: ANA ERICKSON   Direct patient critical care time: 10 minutes  Additional history critical care time: 5 minutes  Ordering / reviewing critical care time: 5 minutes  Documentation critical care time: 5 minutes  Consulting other physicians critical care time: 5 minutes  Consult with family critical care time: 5 minutes  Total critical care time (exclusive of procedural time) : 35 minutes  Critical care time was exclusive of separately billable procedures and treating other patients and teaching time.  Critical care was necessary to treat or prevent imminent or life-threatening deterioration of the following conditions: hypoxia associated from pneumonia to be treated with ABX and breathing treatments.  Critical care was time spent personally by me on the following activities: blood draw for specimens, development of treatment plan with patient or surrogate, discussions with consultants, interpretation of cardiac output measurements, evaluation of patient's response to treatment, examination of patient, obtaining history from patient or surrogate, ordering and performing treatments and interventions, ordering and review of laboratory studies, ordering and review of radiographic studies, pulse oximetry, re-evaluation of patient's condition and review of old charts.  Subsequent provider of critical care: I assumed direction of critical care for this patient from another provider of my specialty.        ED Vital Signs:  Vitals:    11/12/17 1527 11/12/17 1602 11/12/17 1645 11/12/17 1715   BP: 130/70  137/63 (!) 119/55   Pulse: (!) 113  77 104   Resp: 18  (!) 23 (!) 21   Temp: (!) 100.6 °F (38.1 °C)      TempSrc: Oral      SpO2: (!) 92%  97% (!) 93%   Weight:  95.3 kg (210 lb)     Height: 6' 1" (1.854 m)       11/12/17 1745 11/12/17 1800 11/12/17 1852 11/12/17 1900   BP: (!) 130/56  133/62    Pulse: 100 106 105    Resp: (!) 22 20 " 20    Temp:    99.8 °F (37.7 °C)   TempSrc:       SpO2: 95%  96%    Weight:       Height:           Abnormal Lab Results:  Labs Reviewed   CBC W/ AUTO DIFFERENTIAL - Abnormal; Notable for the following:        Result Value    RBC 4.39 (*)     Hemoglobin 13.9 (*)     MCH 31.7 (*)     Platelets 121 (*)     All other components within normal limits   COMPREHENSIVE METABOLIC PANEL - Abnormal; Notable for the following:     Sodium 134 (*)     Glucose 56 (*)     Calcium 10.6 (*)     Albumin 2.2 (*)     Total Bilirubin 1.2 (*)     Alkaline Phosphatase 146 (*)      (*)     All other components within normal limits   PHOSPHORUS - Abnormal; Notable for the following:     Phosphorus 2.4 (*)     All other components within normal limits   TSH - Abnormal; Notable for the following:     TSH 0.391 (*)     All other components within normal limits   URINALYSIS - Abnormal; Notable for the following:     Protein, UA Trace (*)     Occult Blood UA Trace (*)     Urobilinogen, UA 4.0-6.0 (*)     All other components within normal limits   POCT GLUCOSE - Abnormal; Notable for the following:     POCT Glucose 65 (*)     All other components within normal limits   ISTAT PROCEDURE - Abnormal; Notable for the following:     POC PCO2 31.2 (*)     POC PO2 52 (*)     POC HCO3 19.7 (*)     POC SATURATED O2 87 (*)     All other components within normal limits   CULTURE, BLOOD   CULTURE, URINE   CULTURE, BLOOD   APTT   B-TYPE NATRIURETIC PEPTIDE   LACTIC ACID, PLASMA   LIPASE   MAGNESIUM   PROTIME-INR   TROPONIN I   INFLUENZA A AND B ANTIGEN   T4, FREE   CORTISOL, RANDOM   PROCALCITONIN   LACTIC ACID, PLASMA        All Lab Results:  Results for orders placed or performed during the hospital encounter of 11/12/17   APTT   Result Value Ref Range    aPTT 31.3 21.0 - 32.0 sec   Brain natriuretic peptide   Result Value Ref Range    BNP 48 0 - 99 pg/mL   CBC auto differential   Result Value Ref Range    WBC 4.44 3.90 - 12.70 K/uL    RBC 4.39 (L)  4.60 - 6.20 M/uL    Hemoglobin 13.9 (L) 14.0 - 18.0 g/dL    Hematocrit 41.0 40.0 - 54.0 %    MCV 93 82 - 98 fL    MCH 31.7 (H) 27.0 - 31.0 pg    MCHC 33.9 32.0 - 36.0 g/dL    RDW 14.0 11.5 - 14.5 %    Platelets 121 (L) 150 - 350 K/uL    MPV 9.8 9.2 - 12.9 fL    Gran # 2.9 1.8 - 7.7 K/uL    Lymph # 1.1 1.0 - 4.8 K/uL    Mono # 0.4 0.3 - 1.0 K/uL    Eos # 0.1 0.0 - 0.5 K/uL    Baso # 0.01 0.00 - 0.20 K/uL    Gran% 64.2 38.0 - 73.0 %    Lymph% 24.8 18.0 - 48.0 %    Mono% 9.2 4.0 - 15.0 %    Eosinophil% 1.6 0.0 - 8.0 %    Basophil% 0.2 0.0 - 1.9 %    Differential Method Automated    Comprehensive metabolic panel   Result Value Ref Range    Sodium 134 (L) 136 - 145 mmol/L    Potassium 3.7 3.5 - 5.1 mmol/L    Chloride 103 95 - 110 mmol/L    CO2 23 23 - 29 mmol/L    Glucose 56 (L) 70 - 110 mg/dL    BUN, Bld 11 8 - 23 mg/dL    Creatinine 0.7 0.5 - 1.4 mg/dL    Calcium 10.6 (H) 8.7 - 10.5 mg/dL    Total Protein 7.7 6.0 - 8.4 g/dL    Albumin 2.2 (L) 3.5 - 5.2 g/dL    Total Bilirubin 1.2 (H) 0.1 - 1.0 mg/dL    Alkaline Phosphatase 146 (H) 55 - 135 U/L     (H) 10 - 40 U/L    ALT 43 10 - 44 U/L    Anion Gap 8 8 - 16 mmol/L    eGFR if African American >60 >60 mL/min/1.73 m^2    eGFR if non African American >60 >60 mL/min/1.73 m^2   Lactic acid, plasma #1   Result Value Ref Range    Lactate (Lactic Acid) 2.2 0.5 - 2.2 mmol/L   Lipase   Result Value Ref Range    Lipase 14 4 - 60 U/L   Magnesium   Result Value Ref Range    Magnesium 1.8 1.6 - 2.6 mg/dL   Phosphorus   Result Value Ref Range    Phosphorus 2.4 (L) 2.7 - 4.5 mg/dL   Protime-INR   Result Value Ref Range    Prothrombin Time 12.2 9.0 - 12.5 sec    INR 1.2 0.8 - 1.2   Troponin I   Result Value Ref Range    Troponin I 0.020 0.000 - 0.026 ng/mL   TSH   Result Value Ref Range    TSH 0.391 (L) 0.400 - 4.000 uIU/mL   Urinalysis   Result Value Ref Range    Specimen UA Urine, Clean Catch     Color, UA Yellow Yellow, Straw, Tamar    Appearance, UA Clear Clear    pH, UA 6.0  5.0 - 8.0    Specific Gravity, UA 1.015 1.005 - 1.030    Protein, UA Trace (A) Negative    Glucose, UA Negative Negative    Ketones, UA Negative Negative    Bilirubin (UA) Negative Negative    Occult Blood UA Trace (A) Negative    Nitrite, UA Negative Negative    Urobilinogen, UA 4.0-6.0 (A) <2.0 EU/dL    Leukocytes, UA Negative Negative   Influenza antigen Nasopharyngeal Swab   Result Value Ref Range    Influenza A Ag, EIA Negative Negative    Influenza B Ag, EIA Negative Negative    Flu A & B Source Nasopharyngeal Swab    T4, free   Result Value Ref Range    Free T4 1.06 0.71 - 1.51 ng/dL   POCT glucose   Result Value Ref Range    POCT Glucose 65 (L) 70 - 110 mg/dL   ISTAT PROCEDURE   Result Value Ref Range    POC PH 7.409 7.35 - 7.45    POC PCO2 31.2 (L) 35 - 45 mmHg    POC PO2 52 (LL) 80 - 100 mmHg    POC HCO3 19.7 (L) 24 - 28 mmol/L    POC BE -5 -2 to 2 mmol/L    POC SATURATED O2 87 (L) 95 - 100 %    Sample ARTERIAL     Site LR     Allens Test Pass     DelSys Room Air     Mode SPONT     FiO2 21        Imaging Results:  Imaging Results          CT Head Without Contrast (Final result)  Result time 11/12/17 18:19:51    Final result by Leonor Marques III, MD (11/12/17 18:19:51)                 Impression:       1.  No acute intracranial disease identified.    2.  Mild age related atrophy and minimal chronic microvascular ischemic change.        Electronically signed by: LEONOR MARQUES MD  Date:     11/12/17  Time:    18:19              Narrative:    Head CT without contrast    Clinical history: R41.82 Altered mental status, unspecified; generalized weakness    TECHNIQUE: Routine noncontrast axial head CT. All CT scans at this facility use dose modulation, iterative reconstruction, and/or weight based dosing when appropriate to reduce radiation dose to as low as reasonably achievable.    Comparison: none    FINDINGS: There is mild diffuse age-related atrophy.  Atherosclerotic calcifications of the intracranial  arteries are noted. There is minimal periventricular white matter hypodensity, most consistent with chronic microvascular ischemic change.  There is no CT evidence of acute infarct, intracranial hemorrhage, mass-effect,  obstructive hydrocephalus or other acute disease. The visualized paranasal sinuses and mastoid air cells are clear. No calvarial fracture is identified.                             X-Ray Chest AP Portable (Final result)  Result time 11/12/17 16:46:20    Final result by Leonor Marques III, MD (11/12/17 16:46:20)                 Impression:     See above.        Electronically signed by: LEONOR MARQUES MD  Date:     11/12/17  Time:    16:46              Narrative:    XR CHEST AP PORTABLE    Clinical history: sepsis.      Findings: Cardiomediastinal silhouette is within normal limits for AP technique. There is mild elevation the right diaphragm was mild adjacent right infrahilar vascular crowding. There are symmetric band-shaped opacities in the bilateral medial upper lobes, possibly infiltrates or vascular congestion.  Opacity in the retrocardiac left lung base could be related to underpenetration or infiltrate.  The remainder the lungs appear clear of active disease.                             The EKG was ordered, reviewed, and independently interpreted by the ED provider.  Interpretation time: 1536  Rate: 111 BPM  Rhythm: sinus tachycardia with frequent premature ventricular complexes  Interpretation: Otherwise normal ECG. No STEMI.         The Emergency Provider reviewed the vital signs and test results, which are outlined above.    ED Discussion     4:00 PM: Dr. Denton transfers care of pt to Dr. Garcia, pending lab and imaging results.    5:32 PM: Re-evaluated pt. I have discussed test results, shared treatment plan, and the need for admission with patient and family at bedside. Pt and family express understanding at this time and agree with all information. All questions answered. Pt and  family have no further questions or concerns at this time. Pt is ready for admit.    7:00 PM: Re-evaluated pt. Pt is resting comfortably and is in no acute distress. Pt's family reports that the pt is HIV positive.  D/w pt all pertinent results. D/w pt any concerns expressed at this time. Answered all questions. Pt expresses understanding at this time.    7:05 PM: Discussed case with Dr. Crum ([facility]). Dr. Crum agrees with current care and management of pt and accepts admission. Dr. Crum requests IV bactrim be started.  Admitting Service: [facility]   Admitting Physician: Radames  Admit to: Tele    7:10 PM: Dr. Garcia spoke to pharmacy who will order the IV bactrim.      ED Medication(s):  Medications   sulfamethoxazole-trimethoprim 400-80 mg/5 mL (BACTRIM) 635 mg in dextrose 5 % 635 mL IVPB (not administered)   albuterol sulfate nebulizer solution 2.5 mg (not administered)   sodium chloride 0.9% bolus 2,859 mL (0 mL/kg × 95.3 kg Intravenous Stopped 11/12/17 1833)   cefTRIAXone (ROCEPHIN) 1 g in dextrose 5 % 50 mL IVPB (0 g Intravenous Stopped 11/12/17 1909)   albuterol sulfate nebulizer solution 2.5 mg (2.5 mg Nebulization Given 11/12/17 1800)           Medical Decision Making    Medical Decision Making:   Clinical Tests:   Lab Tests: Ordered and Reviewed  Radiological Study: Ordered and Reviewed  Medical Tests: Ordered and Reviewed           Scribe Attestation:   Scribe #1: I performed the above scribed service and the documentation accurately describes the services I performed. I attest to the accuracy of the note.    Attending:   Physician Attestation Statement for Scribe #1: I, Lizet Denton MD, personally performed the services described in this documentation, as scribed by Ximena Scales, in my presence, and it is both accurate and complete.       Scribe Attestation:   Scribe #2: I performed the above scribed service and the documentation accurately describes the services I performed. I  attest to the accuracy of the note.    Attending Attestation:           Physician Attestation for Scribe:    Physician Attestation Statement for Scribe #2: I, Chucho Garcia MD, reviewed documentation, as scribed by Ze Hooper in my presence, and it is both accurate and complete. I also acknowledge and confirm the content of the note done by Scribe #1.          Clinical Impression       ICD-10-CM ICD-9-CM   1. Pneumonia due to infectious organism, unspecified laterality, unspecified part of lung J18.9 136.9     484.8   2. Weakness R53.1 780.79   3. Altered mental status R41.82 780.97   4. Hypoxia R09.02 799.02       Disposition:   Disposition: Admitted (tele)  Condition: Stable         Chucho Garcia MD  11/12/17 2028

## 2017-11-13 NOTE — ASSESSMENT & PLAN NOTE
- Likely PCP pneumonia.  - Patient hypoxic with PO2 52 on admission; No respiratory distress.  - Will admit to Inpatient.  - IV Bactrim given high likelihood of PCP.  - Supplemental O2, Duonebs.  - Blood and sputum culture pending.  - Check CD4.  - I.D. consult in AM.

## 2017-11-13 NOTE — ASSESSMENT & PLAN NOTE
- BP controlled currently.  - Continue home amlodipine.  - Will hold home ACEi and diuretic to prevent hypotension given severe sepsis.  Monitor BP trends, and resume as needed.

## 2017-11-13 NOTE — ASSESSMENT & PLAN NOTE
- Glucose 56 on admission.  Will initiate hypoglycemic protocol.  - AccuChecks with SSI.  - Diabetic diet.  - Check HbA1c.

## 2017-11-13 NOTE — PLAN OF CARE
Problem: Fall Risk (Adult)  Goal: Absence of Falls  Patient will demonstrate the desired outcomes by discharge/transition of care.   POC reviewed w/ pt. Verbalized understanding. Pt. remains free of falls. VS obtained and assessementt completed. Cardiac monitor in place. Call bell and personal belongings in reach, bed in lowest position. Reminded pt to call for assistance.

## 2017-11-13 NOTE — ASSESSMENT & PLAN NOTE
- CD4 count of 247, viral load of 6.5 million on 8/10/2017.  - Repeat CD4.  - Patient has been off HAART since 4/2017 due to noncompliance.  - Follows with \[facility]\"" infectious disease.  - Consult I.D. in AM.

## 2017-11-13 NOTE — H&P
[facility] Hospital Medicine  History & Physical    Patient Name: Jeffrey Rosales  MRN: [id number]  Admission Date: 11/12/2017  Attending Physician: Felipe rCum MD   Primary Care Provider: Augie Beaver MD ([facility])         Patient information was obtained from patient, relative(s), past medical records and ER records.     Subjective:     Principal Problem:Pneumonia due to infectious organism    Chief Complaint:   Chief Complaint   Patient presents with    Fatigue     increase weakness since wednesday.         HPI: Patient is poor historian due to somnolence.  History obtained from patient's son, ED staff, and past medical record in Care Everywhere.  Mr. Rosales is a 65 yo male  with a PMHx of HIV/AIDS, HCV, HTN, HLD, DM II, DDD with chronic back pain, PUD, and h/o anal cancer, who presented to the ED with c/o generalized weakness, fatigue, and malaise that has progressively worsened over the past 2 weeks.  Associated subjective fevers, lethargy, and ~15-20 pound weight loss (unintentional) in past 3 months .  Denies any chest pain, palpitations, SOB/STREET, cough, orthopnea, PND, edema, weight gain/loss, decreased appetite, ABD pain or distention, N/V/D, melena/rectal bleeding, dysuria, hematuria, lightheadedness/dizziness, HA, visual disturbance, focal deficit, syncope, diaphoresis, or chills.  Patient's son also reports new abrasion to mid back that he noticed a few days ago with clear drainage.  Initial work-up in ED noted temp 100.6, , RR 23, O2 sat 92% on RA.  ABG resulted pH 7.4, CO2 31, O2 52, HCO3 19.7.  UA unremarkable.  CT head with no acute process.  CXR with bilateral infiltrates consistent with PCP pneumonia.  Hospital Medicine was consulted for admission.  Currently, patient appears comfortable, in NAD.  He remains lethargic, but easily aroused.    Past Medical History:   Diagnosis Date    AIDS     Chronic back pain     Chronic hepatitis C     DDD  (degenerative disc disease), lumbar     Diabetes mellitus     Hepatitis B antibody positive     History of anal cancer     HIV (human immunodeficiency virus infection)     Hypertension     Neuropathy     Obesity     Syphilis     Vitreous detachment of right eye        Past Surgical History:   Procedure Laterality Date    anal biopsy      COLONOSCOPY W/ BIOPSIES      ESOPHAGOGASTRODUODENOSCOPY      EYE SURGERY      MEDIPORT INSERTION, SINGLE      MEDIPORT REMOVAL         Review of patient's allergies indicates:  No Known Allergies    No current facility-administered medications on file prior to encounter.      Medications have been reviewed and reconciled.  Refer to admission Medication Rec for outpatient meds.      Family History     Problem Relation (Age of Onset)    Diabetes Sister, Brother    No Known Problems Mother, Father        Social History Main Topics    Smoking status: Former Smoker     Quit date: 1999    Smokeless tobacco: Never Used    Alcohol use Yes      Comment: occasional    Drug use: No    Sexual activity: No     Review of Systems   Constitutional: Positive for chills, fatigue and fever. Negative for diaphoresis and unexpected weight change.        Malaise.   HENT: Negative for congestion, postnasal drip, rhinorrhea, sinus pressure, sore throat and trouble swallowing.    Eyes: Negative for photophobia and visual disturbance.   Respiratory: Negative for apnea, cough, chest tightness, shortness of breath and wheezing.    Cardiovascular: Negative for chest pain, palpitations and leg swelling.   Gastrointestinal: Negative for abdominal distention, abdominal pain, blood in stool, constipation, diarrhea, nausea and vomiting.   Endocrine: Negative for polydipsia, polyphagia and polyuria.   Genitourinary: Negative for decreased urine volume, difficulty urinating, dysuria, frequency, hematuria and urgency.   Musculoskeletal: Negative for arthralgias, back pain, gait problem, joint  swelling, myalgias, neck pain and neck stiffness.   Skin: Positive for wound (back with drainage). Negative for pallor and rash.   Allergic/Immunologic: Positive for immunocompromised state.   Neurological: Negative for dizziness, seizures, syncope, facial asymmetry, speech difficulty, weakness, light-headedness, numbness and headaches.   Psychiatric/Behavioral: Negative for agitation, confusion, hallucinations and sleep disturbance. The patient is not nervous/anxious.    All other systems reviewed and are negative.    Objective:     Vital Signs (Most Recent):  Temp: 99.8 °F (37.7 °C) (11/12/17 1900)  Pulse: 105 (11/12/17 1924)  Resp: 18 (11/12/17 1924)  BP: 133/62 (11/12/17 1852)  SpO2: (!) 94 % (11/12/17 1924) Vital Signs (24h Range):  Temp:  [99.8 °F (37.7 °C)-100.6 °F (38.1 °C)] 99.8 °F (37.7 °C)  Pulse:  [] 105  Resp:  [18-23] 18  SpO2:  [92 %-97 %] 94 %  BP: (119-137)/(55-70) 133/62     Weight: 95.3 kg (210 lb)  Body mass index is 27.71 kg/m².    Physical Exam   Constitutional: He is oriented to person, place, and time. He appears well-developed and well-nourished. He appears lethargic. He is sleeping. He is easily aroused. No distress.   HENT:   Head: Normocephalic and atraumatic.   Eyes: Conjunctivae and EOM are normal. Pupils are equal, round, and reactive to light.   Neck: Normal range of motion. Neck supple. No JVD present.   Cardiovascular: Regular rhythm, S1 normal, S2 normal and intact distal pulses.   No extrasystoles are present. Tachycardia present.  Exam reveals no gallop.    No murmur heard.  Pulses:       Radial pulses are 2+ on the right side, and 2+ on the left side.        Dorsalis pedis pulses are 2+ on the right side, and 2+ on the left side.        Posterior tibial pulses are 2+ on the right side, and 2+ on the left side.   Pulmonary/Chest: Effort normal and breath sounds normal. No accessory muscle usage. No tachypnea. No respiratory distress. He has no decreased breath sounds. He  has no wheezes. He has no rales.   Abdominal: Soft. Bowel sounds are normal. He exhibits no distension. There is no tenderness. There is no rebound and no guarding.   Musculoskeletal: Normal range of motion. He exhibits no edema, tenderness or deformity.   Neurological: He is oriented to person, place, and time and easily aroused. He appears lethargic. No cranial nerve deficit or sensory deficit. GCS eye subscore is 3. GCS verbal subscore is 5. GCS motor subscore is 6.   Skin: Skin is warm and dry. Capillary refill takes less than 2 seconds. Abrasion (midback, no drainage appreciated) noted. No rash noted. He is not diaphoretic. No erythema.   Psychiatric: His speech is normal. Thought content normal. His mood appears not anxious. He is not actively hallucinating. Cognition and memory are normal. He does not exhibit a depressed mood. He is attentive.   Nursing note and vitals reviewed.    Tissue Perfusion Assessment  Vital signs reviewed. A focused perfusion assessment was completed.  Hemodynamically stable, no shock.  Cardiovascular Exam: Regular rhythm, S1 normal, S2 normal and intact distal pulses. No murmur heard. Exam reveals no gallop.   Pulses:       Radial pulses are 2+ on the right side, and 2+ on the left side.        Dorsalis pedis pulses are 2+ on the right side, and 2+ on the left side.        Posterior tibial pulses are 2+ on the right side, and 2+ on the left side.     Pulmonary Exam: Effort normal and breath sounds normal. No respiratory distress. He has no wheezes. He has no rales.     Skin Exam: Skin is warm and dry. No erythema. Abrasion (midback, no drainage appreciated) noted. No rash noted. Capillary refill <3 sec.    Passive Leg Raise: negative  Fluid Challenge: responsive         Significant Labs:   Results for orders placed or performed during the hospital encounter of 11/12/17   APTT   Result Value Ref Range    aPTT 31.3 21.0 - 32.0 sec   Brain natriuretic peptide   Result Value Ref Range     BNP 48 0 - 99 pg/mL   CBC auto differential   Result Value Ref Range    WBC 4.44 3.90 - 12.70 K/uL    RBC 4.39 (L) 4.60 - 6.20 M/uL    Hemoglobin 13.9 (L) 14.0 - 18.0 g/dL    Hematocrit 41.0 40.0 - 54.0 %    MCV 93 82 - 98 fL    MCH 31.7 (H) 27.0 - 31.0 pg    MCHC 33.9 32.0 - 36.0 g/dL    RDW 14.0 11.5 - 14.5 %    Platelets 121 (L) 150 - 350 K/uL    MPV 9.8 9.2 - 12.9 fL    Gran # 2.9 1.8 - 7.7 K/uL    Lymph # 1.1 1.0 - 4.8 K/uL    Mono # 0.4 0.3 - 1.0 K/uL    Eos # 0.1 0.0 - 0.5 K/uL    Baso # 0.01 0.00 - 0.20 K/uL    Gran% 64.2 38.0 - 73.0 %    Lymph% 24.8 18.0 - 48.0 %    Mono% 9.2 4.0 - 15.0 %    Eosinophil% 1.6 0.0 - 8.0 %    Basophil% 0.2 0.0 - 1.9 %    Differential Method Automated    Comprehensive metabolic panel   Result Value Ref Range    Sodium 134 (L) 136 - 145 mmol/L    Potassium 3.7 3.5 - 5.1 mmol/L    Chloride 103 95 - 110 mmol/L    CO2 23 23 - 29 mmol/L    Glucose 56 (L) 70 - 110 mg/dL    BUN, Bld 11 8 - 23 mg/dL    Creatinine 0.7 0.5 - 1.4 mg/dL    Calcium 10.6 (H) 8.7 - 10.5 mg/dL    Total Protein 7.7 6.0 - 8.4 g/dL    Albumin 2.2 (L) 3.5 - 5.2 g/dL    Total Bilirubin 1.2 (H) 0.1 - 1.0 mg/dL    Alkaline Phosphatase 146 (H) 55 - 135 U/L     (H) 10 - 40 U/L    ALT 43 10 - 44 U/L    Anion Gap 8 8 - 16 mmol/L    eGFR if African American >60 >60 mL/min/1.73 m^2    eGFR if non African American >60 >60 mL/min/1.73 m^2   Lactic acid, plasma #1   Result Value Ref Range    Lactate (Lactic Acid) 2.2 0.5 - 2.2 mmol/L   Lipase   Result Value Ref Range    Lipase 14 4 - 60 U/L   Magnesium   Result Value Ref Range    Magnesium 1.8 1.6 - 2.6 mg/dL   Phosphorus   Result Value Ref Range    Phosphorus 2.4 (L) 2.7 - 4.5 mg/dL   Protime-INR   Result Value Ref Range    Prothrombin Time 12.2 9.0 - 12.5 sec    INR 1.2 0.8 - 1.2   Troponin I   Result Value Ref Range    Troponin I 0.020 0.000 - 0.026 ng/mL   TSH   Result Value Ref Range    TSH 0.391 (L) 0.400 - 4.000 uIU/mL   Urinalysis   Result Value Ref Range     Specimen UA Urine, Clean Catch     Color, UA Yellow Yellow, Straw, Tamar    Appearance, UA Clear Clear    pH, UA 6.0 5.0 - 8.0    Specific Gravity, UA 1.015 1.005 - 1.030    Protein, UA Trace (A) Negative    Glucose, UA Negative Negative    Ketones, UA Negative Negative    Bilirubin (UA) Negative Negative    Occult Blood UA Trace (A) Negative    Nitrite, UA Negative Negative    Urobilinogen, UA 4.0-6.0 (A) <2.0 EU/dL    Leukocytes, UA Negative Negative   Influenza antigen Nasopharyngeal Swab   Result Value Ref Range    Influenza A Ag, EIA Negative Negative    Influenza B Ag, EIA Negative Negative    Flu A & B Source Nasopharyngeal Swab    Lactic acid, plasma #2   Result Value Ref Range    Lactate (Lactic Acid) 1.1 0.5 - 2.2 mmol/L   T4, free   Result Value Ref Range    Free T4 1.06 0.71 - 1.51 ng/dL   POCT glucose   Result Value Ref Range    POCT Glucose 65 (L) 70 - 110 mg/dL   ISTAT PROCEDURE   Result Value Ref Range    POC PH 7.409 7.35 - 7.45    POC PCO2 31.2 (L) 35 - 45 mmHg    POC PO2 52 (LL) 80 - 100 mmHg    POC HCO3 19.7 (L) 24 - 28 mmol/L    POC BE -5 -2 to 2 mmol/L    POC SATURATED O2 87 (L) 95 - 100 %    Sample ARTERIAL     Site LR     Allens Test Pass     DelSys Room Air     Mode SPONT     FiO2 21       All pertinent labs within the past 24 hours have been reviewed.    Significant Imaging:   Imaging Results          CT Head Without Contrast (Final result)  Result time 11/12/17 18:19:51    Final result by Leonor Marques III, MD (11/12/17 18:19:51)                 Impression:       1.  No acute intracranial disease identified.    2.  Mild age related atrophy and minimal chronic microvascular ischemic change.        Electronically signed by: LEONOR MARQUES MD  Date:     11/12/17  Time:    18:19              Narrative:    Head CT without contrast    Clinical history: R41.82 Altered mental status, unspecified; generalized weakness    TECHNIQUE: Routine noncontrast axial head CT. All CT scans at this facility  use dose modulation, iterative reconstruction, and/or weight based dosing when appropriate to reduce radiation dose to as low as reasonably achievable.    Comparison: none    FINDINGS: There is mild diffuse age-related atrophy.  Atherosclerotic calcifications of the intracranial arteries are noted. There is minimal periventricular white matter hypodensity, most consistent with chronic microvascular ischemic change.  There is no CT evidence of acute infarct, intracranial hemorrhage, mass-effect,  obstructive hydrocephalus or other acute disease. The visualized paranasal sinuses and mastoid air cells are clear. No calvarial fracture is identified.                             X-Ray Chest AP Portable (Final result)  Result time 11/12/17 16:46:20    Final result by Leonor Marques III, MD (11/12/17 16:46:20)                 Impression:     See above.        Electronically signed by: LEONOR MARQUES MD  Date:     11/12/17  Time:    16:46              Narrative:    XR CHEST AP PORTABLE    Clinical history: sepsis.      Findings: Cardiomediastinal silhouette is within normal limits for AP technique. There is mild elevation the right diaphragm was mild adjacent right infrahilar vascular crowding. There are symmetric band-shaped opacities in the bilateral medial upper lobes, possibly infiltrates or vascular congestion.  Opacity in the retrocardiac left lung base could be related to underpenetration or infiltrate.  The remainder the lungs appear clear of active disease.                             I have reviewed all pertinent imaging results/findings within the past 24 hours.        Assessment/Plan:     * Pneumonia due to infectious organism    - Likely PCP pneumonia.  - Patient hypoxic with PO2 52 on admission; No respiratory distress.  - Will admit to Inpatient.  - IV Bactrim given high likelihood of PCP.  - Supplemental O2, Duonebs.  - Blood and sputum culture pending.  - Check CD4.  - I.D. consult in AM.        Severe sepsis     - Likely secondary to above.  - Patient hemodynamically stable, no shock.  - Initial lactic 2.2, WBC 4.4.  Follow serial trends.  - Blood, urine, and sputum cultures pending.  - IV Bactrim as above.  - Fluid resuscitation initiated in ED.  - Continuous IVF's with 0.9% NS @ 75 mL/hr.        Type 2 diabetes mellitus    - Glucose 56 on admission.  Will initiate hypoglycemic protocol.  - AccuChecks with SSI.  - Diabetic diet.  - Check HbA1c.        AIDS (acquired immunodeficiency syndrome), CD4 >200 and <500    - CD4 count of 247, viral load of 6.5 million on 8/10/2017.  - Repeat CD4.  - Patient has been off HAART since 4/2017 due to noncompliance.  - Follows with [facility] infectious disease.  - Consult I.D. in AM.        Essential hypertension    - BP controlled currently.  - Continue home amlodipine.  - Will hold home ACEi and diuretic to prevent hypotension given severe sepsis.  Monitor BP trends, and resume as needed.          VTE Risk Mitigation         Ordered     enoxaparin injection 40 mg  Daily     Route:  Subcutaneous        11/12/17 2054     Medium Risk of VTE  Once      11/12/17 2054     Place sequential compression device  Until discontinued      11/12/17 2054             MARLENE Chaudhry  Department of Hospital Medicine   [facility]

## 2017-11-13 NOTE — NURSING
Pt. Arrived to the unit. Made comfortable in bed, cardiac monitor applied. VS and assessment completed. Orientated pt. to room. Education on hourly rounds and fall precautions, Reminded to call for assistance. Call bell and Belongings within reach. completed. Cardiac monitor in place. Call bell and personal belongings in reach, bed in lowest position. Family at bedside. Reminded pt to call for assistance.

## 2017-11-13 NOTE — HPI
Patient is poor historian due to somnolence.  History obtained from patient's son, ED staff, and past medical record in Care Everywhere.  Mr. Rosales is a 67 yo male with a PMHx of HIV/AIDS, HCV, HTN, HLD, DM II, DDD with chronic back pain, PUD, and h/o anal cancer, who presented to the ED with c/o generalized weakness, fatigue, and malaise that has progressively worsened over the past 2 weeks.  Associated subjective fevers, lethargy, and ~15-20 pound weight loss (unintentional) in past 3 months .  Denies any chest pain, palpitations, SOB/STREET, cough, orthopnea, PND, edema, weight gain/loss, decreased appetite, ABD pain or distention, N/V/D, melena/rectal bleeding, dysuria, hematuria, lightheadedness/dizziness, HA, visual disturbance, focal deficit, syncope, diaphoresis, or chills.  Patient's son also reports new abrasion to mid back that he noticed a few days ago with clear drainage.  Initial work-up in ED noted temp 100.6, , RR 23, O2 sat 92% on RA.  ABG resulted pH 7.4, CO2 31, O2 52, HCO3 19.7.  UA unremarkable.  CT head with no acute process.  CXR with bilateral infiltrates consistent with PCP pneumonia.  Hospital Medicine was consulted for admission.  Currently, patient appears comfortable, in NAD.  He remains lethargic, but easily aroused.

## 2017-11-13 NOTE — SUBJECTIVE & OBJECTIVE
Past Medical History:   Diagnosis Date    AIDS     Chronic back pain     Chronic hepatitis C     DDD (degenerative disc disease), lumbar     Diabetes mellitus     Hepatitis B antibody positive     History of anal cancer     HIV (human immunodeficiency virus infection)     Hypertension     Neuropathy     Obesity     Syphilis     Vitreous detachment of right eye        Past Surgical History:   Procedure Laterality Date    anal biopsy      COLONOSCOPY W/ BIOPSIES      ESOPHAGOGASTRODUODENOSCOPY      EYE SURGERY      MEDIPORT INSERTION, SINGLE      MEDIPORT REMOVAL         Review of patient's allergies indicates:  No Known Allergies    No current facility-administered medications on file prior to encounter.      Medications have been reviewed and reconciled.  Refer to admission Medication Rec for outpatient meds.      Family History     Problem Relation (Age of Onset)    Diabetes Sister, Brother    No Known Problems Mother, Father        Social History Main Topics    Smoking status: Former Smoker     Quit date: 1999    Smokeless tobacco: Never Used    Alcohol use Yes      Comment: occasional    Drug use: No    Sexual activity: No     Review of Systems   Constitutional: Positive for chills, fatigue and fever. Negative for diaphoresis and unexpected weight change.        Malaise.   HENT: Negative for congestion, postnasal drip, rhinorrhea, sinus pressure, sore throat and trouble swallowing.    Eyes: Negative for photophobia and visual disturbance.   Respiratory: Negative for apnea, cough, chest tightness, shortness of breath and wheezing.    Cardiovascular: Negative for chest pain, palpitations and leg swelling.   Gastrointestinal: Negative for abdominal distention, abdominal pain, blood in stool, constipation, diarrhea, nausea and vomiting.   Endocrine: Negative for polydipsia, polyphagia and polyuria.   Genitourinary: Negative for decreased urine volume, difficulty urinating, dysuria, frequency,  hematuria and urgency.   Musculoskeletal: Negative for arthralgias, back pain, gait problem, joint swelling, myalgias, neck pain and neck stiffness.   Skin: Positive for wound (back with drainage). Negative for pallor and rash.   Allergic/Immunologic: Positive for immunocompromised state.   Neurological: Negative for dizziness, seizures, syncope, facial asymmetry, speech difficulty, weakness, light-headedness, numbness and headaches.   Psychiatric/Behavioral: Negative for agitation, confusion, hallucinations and sleep disturbance. The patient is not nervous/anxious.    All other systems reviewed and are negative.    Objective:     Vital Signs (Most Recent):  Temp: 99.8 °F (37.7 °C) (11/12/17 1900)  Pulse: 105 (11/12/17 1924)  Resp: 18 (11/12/17 1924)  BP: 133/62 (11/12/17 1852)  SpO2: (!) 94 % (11/12/17 1924) Vital Signs (24h Range):  Temp:  [99.8 °F (37.7 °C)-100.6 °F (38.1 °C)] 99.8 °F (37.7 °C)  Pulse:  [] 105  Resp:  [18-23] 18  SpO2:  [92 %-97 %] 94 %  BP: (119-137)/(55-70) 133/62     Weight: 95.3 kg (210 lb)  Body mass index is 27.71 kg/m².    Physical Exam   Constitutional: He is oriented to person, place, and time. He appears well-developed and well-nourished. He appears lethargic. He is sleeping. He is easily aroused. No distress.   HENT:   Head: Normocephalic and atraumatic.   Eyes: Conjunctivae and EOM are normal. Pupils are equal, round, and reactive to light.   Neck: Normal range of motion. Neck supple. No JVD present.   Cardiovascular: Regular rhythm, S1 normal, S2 normal and intact distal pulses.   No extrasystoles are present. Tachycardia present.  Exam reveals no gallop.    No murmur heard.  Pulses:       Radial pulses are 2+ on the right side, and 2+ on the left side.        Dorsalis pedis pulses are 2+ on the right side, and 2+ on the left side.        Posterior tibial pulses are 2+ on the right side, and 2+ on the left side.   Pulmonary/Chest: Effort normal and breath sounds normal. No  accessory muscle usage. No tachypnea. No respiratory distress. He has no decreased breath sounds. He has no wheezes. He has no rales.   Abdominal: Soft. Bowel sounds are normal. He exhibits no distension. There is no tenderness. There is no rebound and no guarding.   Musculoskeletal: Normal range of motion. He exhibits no edema, tenderness or deformity.   Neurological: He is oriented to person, place, and time and easily aroused. He appears lethargic. No cranial nerve deficit or sensory deficit. GCS eye subscore is 3. GCS verbal subscore is 5. GCS motor subscore is 6.   Skin: Skin is warm and dry. Capillary refill takes less than 2 seconds. Abrasion (midback, no drainage appreciated) noted. No rash noted. He is not diaphoretic. No erythema.   Psychiatric: His speech is normal. Thought content normal. His mood appears not anxious. He is not actively hallucinating. Cognition and memory are normal. He does not exhibit a depressed mood. He is attentive.   Nursing note and vitals reviewed.    Tissue Perfusion Assessment  Vital signs reviewed. A focused perfusion assessment was completed.  Hemodynamically stable, no shock.  Cardiovascular Exam: Regular rhythm, S1 normal, S2 normal and intact distal pulses. No murmur heard. Exam reveals no gallop.   Pulses:       Radial pulses are 2+ on the right side, and 2+ on the left side.        Dorsalis pedis pulses are 2+ on the right side, and 2+ on the left side.        Posterior tibial pulses are 2+ on the right side, and 2+ on the left side.     Pulmonary Exam: Effort normal and breath sounds normal. No respiratory distress. He has no wheezes. He has no rales.     Skin Exam: Skin is warm and dry. No erythema. Abrasion (midback, no drainage appreciated) noted. No rash noted. Capillary refill <3 sec.    Passive Leg Raise: negative  Fluid Challenge: responsive         Significant Labs:   Results for orders placed or performed during the hospital encounter of 11/12/17   APTT    Result Value Ref Range    aPTT 31.3 21.0 - 32.0 sec   Brain natriuretic peptide   Result Value Ref Range    BNP 48 0 - 99 pg/mL   CBC auto differential   Result Value Ref Range    WBC 4.44 3.90 - 12.70 K/uL    RBC 4.39 (L) 4.60 - 6.20 M/uL    Hemoglobin 13.9 (L) 14.0 - 18.0 g/dL    Hematocrit 41.0 40.0 - 54.0 %    MCV 93 82 - 98 fL    MCH 31.7 (H) 27.0 - 31.0 pg    MCHC 33.9 32.0 - 36.0 g/dL    RDW 14.0 11.5 - 14.5 %    Platelets 121 (L) 150 - 350 K/uL    MPV 9.8 9.2 - 12.9 fL    Gran # 2.9 1.8 - 7.7 K/uL    Lymph # 1.1 1.0 - 4.8 K/uL    Mono # 0.4 0.3 - 1.0 K/uL    Eos # 0.1 0.0 - 0.5 K/uL    Baso # 0.01 0.00 - 0.20 K/uL    Gran% 64.2 38.0 - 73.0 %    Lymph% 24.8 18.0 - 48.0 %    Mono% 9.2 4.0 - 15.0 %    Eosinophil% 1.6 0.0 - 8.0 %    Basophil% 0.2 0.0 - 1.9 %    Differential Method Automated    Comprehensive metabolic panel   Result Value Ref Range    Sodium 134 (L) 136 - 145 mmol/L    Potassium 3.7 3.5 - 5.1 mmol/L    Chloride 103 95 - 110 mmol/L    CO2 23 23 - 29 mmol/L    Glucose 56 (L) 70 - 110 mg/dL    BUN, Bld 11 8 - 23 mg/dL    Creatinine 0.7 0.5 - 1.4 mg/dL    Calcium 10.6 (H) 8.7 - 10.5 mg/dL    Total Protein 7.7 6.0 - 8.4 g/dL    Albumin 2.2 (L) 3.5 - 5.2 g/dL    Total Bilirubin 1.2 (H) 0.1 - 1.0 mg/dL    Alkaline Phosphatase 146 (H) 55 - 135 U/L     (H) 10 - 40 U/L    ALT 43 10 - 44 U/L    Anion Gap 8 8 - 16 mmol/L    eGFR if African American >60 >60 mL/min/1.73 m^2    eGFR if non African American >60 >60 mL/min/1.73 m^2   Lactic acid, plasma #1   Result Value Ref Range    Lactate (Lactic Acid) 2.2 0.5 - 2.2 mmol/L   Lipase   Result Value Ref Range    Lipase 14 4 - 60 U/L   Magnesium   Result Value Ref Range    Magnesium 1.8 1.6 - 2.6 mg/dL   Phosphorus   Result Value Ref Range    Phosphorus 2.4 (L) 2.7 - 4.5 mg/dL   Protime-INR   Result Value Ref Range    Prothrombin Time 12.2 9.0 - 12.5 sec    INR 1.2 0.8 - 1.2   Troponin I   Result Value Ref Range    Troponin I 0.020 0.000 - 0.026 ng/mL    TSH   Result Value Ref Range    TSH 0.391 (L) 0.400 - 4.000 uIU/mL   Urinalysis   Result Value Ref Range    Specimen UA Urine, Clean Catch     Color, UA Yellow Yellow, Straw, Tamar    Appearance, UA Clear Clear    pH, UA 6.0 5.0 - 8.0    Specific Gravity, UA 1.015 1.005 - 1.030    Protein, UA Trace (A) Negative    Glucose, UA Negative Negative    Ketones, UA Negative Negative    Bilirubin (UA) Negative Negative    Occult Blood UA Trace (A) Negative    Nitrite, UA Negative Negative    Urobilinogen, UA 4.0-6.0 (A) <2.0 EU/dL    Leukocytes, UA Negative Negative   Influenza antigen Nasopharyngeal Swab   Result Value Ref Range    Influenza A Ag, EIA Negative Negative    Influenza B Ag, EIA Negative Negative    Flu A & B Source Nasopharyngeal Swab    Lactic acid, plasma #2   Result Value Ref Range    Lactate (Lactic Acid) 1.1 0.5 - 2.2 mmol/L   T4, free   Result Value Ref Range    Free T4 1.06 0.71 - 1.51 ng/dL   POCT glucose   Result Value Ref Range    POCT Glucose 65 (L) 70 - 110 mg/dL   ISTAT PROCEDURE   Result Value Ref Range    POC PH 7.409 7.35 - 7.45    POC PCO2 31.2 (L) 35 - 45 mmHg    POC PO2 52 (LL) 80 - 100 mmHg    POC HCO3 19.7 (L) 24 - 28 mmol/L    POC BE -5 -2 to 2 mmol/L    POC SATURATED O2 87 (L) 95 - 100 %    Sample ARTERIAL     Site LR     Allens Test Pass     DelSys Room Air     Mode SPONT     FiO2 21       All pertinent labs within the past 24 hours have been reviewed.    Significant Imaging:   Imaging Results          CT Head Without Contrast (Final result)  Result time 11/12/17 18:19:51    Final result by Leonor Marques III, MD (11/12/17 18:19:51)                 Impression:       1.  No acute intracranial disease identified.    2.  Mild age related atrophy and minimal chronic microvascular ischemic change.        Electronically signed by: LEONOR MARQUES MD  Date:     11/12/17  Time:    18:19              Narrative:    Head CT without contrast    Clinical history: R41.82 Altered mental status,  unspecified; generalized weakness    TECHNIQUE: Routine noncontrast axial head CT. All CT scans at this facility use dose modulation, iterative reconstruction, and/or weight based dosing when appropriate to reduce radiation dose to as low as reasonably achievable.    Comparison: none    FINDINGS: There is mild diffuse age-related atrophy.  Atherosclerotic calcifications of the intracranial arteries are noted. There is minimal periventricular white matter hypodensity, most consistent with chronic microvascular ischemic change.  There is no CT evidence of acute infarct, intracranial hemorrhage, mass-effect,  obstructive hydrocephalus or other acute disease. The visualized paranasal sinuses and mastoid air cells are clear. No calvarial fracture is identified.                             X-Ray Chest AP Portable (Final result)  Result time 11/12/17 16:46:20    Final result by Leonor Marques III, MD (11/12/17 16:46:20)                 Impression:     See above.        Electronically signed by: LEONOR MARQUES MD  Date:     11/12/17  Time:    16:46              Narrative:    XR CHEST AP PORTABLE    Clinical history: sepsis.      Findings: Cardiomediastinal silhouette is within normal limits for AP technique. There is mild elevation the right diaphragm was mild adjacent right infrahilar vascular crowding. There are symmetric band-shaped opacities in the bilateral medial upper lobes, possibly infiltrates or vascular congestion.  Opacity in the retrocardiac left lung base could be related to underpenetration or infiltrate.  The remainder the lungs appear clear of active disease.                             I have reviewed all pertinent imaging results/findings within the past 24 hours.

## 2017-11-13 NOTE — PLAN OF CARE
Initial assessment completed. Met with patient and family . Patient denies any post hospital needs or services at this time. Patient would like a rollator or wheelchair, will get an order from the physician for most appropriate dme. Transitional Care Folder, Discharge Planning Begins on Admission pamphlet, [facility] Bedside Delivery pamphlet, Advance Directive information given to patient along with the contact information. Instructed patient or family to call with any questions or concerns.       11/13/17 1145   Discharge Assessment   Assessment Type Discharge Planning Assessment   Confirmed/corrected address and phone number on facesheet? Yes   Assessment information obtained from? Patient;Caregiver;Medical Record   Expected Length of Stay (days) (tbd)   Communicated expected length of stay with patient/caregiver no   Prior to hospitilization cognitive status: Alert/Oriented   Prior to hospitalization functional status: Assistive Equipment   Current cognitive status: Alert/Oriented   Current Functional Status: Assistive Equipment;Needs Assistance   Facility Arrived From: home   Lives With spouse   Able to Return to Prior Arrangements unable to determine at this time (comments)   Is patient able to care for self after discharge? Unable to determine at this time (comments)   Who are your caregiver(s) and their phone number(s)? Beverly Rosales (son ) [phone]   Patient's perception of discharge disposition home or selfcare;home health   Readmission Within The Last 30 Days no previous admission in last 30 days   Patient currently being followed by outpatient case management? No   Equipment Currently Used at Home cane, straight;walker, rolling   Do you have any problems affording any of your prescribed medications? No   Is the patient taking medications as prescribed? yes   Does the patient have transportation home? Yes   Transportation Available car;family or friend will provide   Does the patient receive  services at the [facility]? No   Discharge Plan A Home;Home with family;Home Health   Discharge Plan B Home;Home with family   Patient/Family In Agreement With Plan yes

## 2017-11-13 NOTE — ASSESSMENT & PLAN NOTE
- Likely secondary to above.  - Patient hemodynamically stable, no shock.  - Initial lactic 2.2, WBC 4.4.  Follow serial trends.  - Blood, urine, and sputum cultures pending.  - IV Bactrim as above.  - Fluid resuscitation initiated in ED.  - Continuous IVF's with 0.9% NS @ 75 mL/hr.

## 2017-11-13 NOTE — PLAN OF CARE
Problem: Pressure Ulcer Risk (Elmer Scale) (Adult,Obstetrics,Pediatric)  Goal: Skin Integrity  Patient will demonstrate the desired outcomes by discharge/transition of care.   POC reviewed w/ pt. Verbalized understanding. Pt. remains free of falls. VS obtained and assessementt completed. Cardiac monitor in place. Call bell and personal belongings in reach, bed in lowest position. Reminded pt to call for assistance.

## 2017-11-13 NOTE — CONSULTS
11/13/17 1020   Handoff Report   Given To KEN Sheehan LPN   Pain/Comfort Assessments   Pain Assessment Performed Yes       Number Scale   Presence of Pain denies   Skin   Skin WDL ex   Skin Color/Characteristics redness nonblanchable   Skin Temperature warm   Skin Moisture dry   Skin Elasticity quick return to original state   Skin Integrity blister(s);pressure ulcer(s)   Elmer Risk Assessment   Sensory Perception 4-->no impairment   Moisture 2-->very moist   Activity 3-->walks occasionally   Mobility 3-->slightly limited   Nutrition 4-->excellent   Friction and Shear 1-->problem   Elmer Score 17       Wound 11/12/17 2021 Blister(s);Other (Comment) posterior back   Date First Assessed/Time First Assessed: 11/12/17 2021   Pre-existing: Yes  Wound Type: Blister(s);Other (Comment)  Orientation: posterior  Location: back   Wound Image    Wound WDL WDL   Dressing Appearance moist drainage   Drainage Amount small   Drainage Characteristics/Odor serous   Wound Base pink;moist   Periwound Area normal skin tone   Wound Edges jagged   Wound Length (cm) 4   Wound Width (cm) 6   Non-staged Wound Description Partial thickness   Irrigated W/ sterile normal saline   Interventions barrier applied   Dressing calcium alginate;foam       Pressure Ulcer 11/12/17 2021 posterior sacral spine Stage III   Date First Assessed/Time First Assessed: 11/12/17 2021   Pressure Ulcer Present on Admission: yes  Orientation: posterior  Location: sacral spine  Staging: Stage III   Wound Image    Staging Stage III   Healing Pressure Ulcer no   Pressure Ulcer Risk Factors activity;mobility;nutrition;shear/friction;moisture   Dressing Appearance moist drainage   Drainage Amount none   Appearance pink;yellow;moist   Periwound Area macerated   Wound Edges jagged   Wound Length (cm) 1   Wound Width (cm) 0.5   Cleansed W/ soap and water   Irrigated W/ sterile normal saline   Interventions barrier applied   Dressing hydrocolloid   Skin Interventions  "  Pressure Reduction Devices pressure-redistributing mattress utilized;positioning supports utilized   Pressure Reduction Techniques frequent weight shift encouraged;positioned off wounds   Skin Protection adhesive use limited;hydrocolloids used;incontinence pads utilized;skin sealant/moisture barrier applied   Positioning   Body Position side-lying, left   Head of Bed (HOB) HOB at 30 degrees       Consulted on this 67 y/o M patient for present on admission stage 3 pressure injury to coccyx and blister to back Patient reports blister is a burn from a heating pad.  Mepilex noted to be saturated with serous drainage.  Peeled back to reveal open blister with moist pink wound bed measuring 4x6 cm.  Cleansed with sterile normal saline and patted dry. Intact andreas wound skin painted with cavilon.  Melgisorb cut to size and applied to wound bed, and covered with mepilex.  Mepilex removed from coccyx to reveal stage 3 pressure injury present on admission with moist pink and yellow wound bed measuring 1x0.5 cm with moist, white macerated edges and surrounding redness.  Cleansed with easi cleanse foam wipes and patted dry. Intact andreas wound skin painted with cavilon, and duoderm applied to cover.  Please see below for wound care recommendations:    Stage 3 pressure injury coccyx:  1. Cleanse with easi cleanse foam wipes  2. Pat dry  3. Paint with cavilon  4. Apply Duoderm. Change DuoDERM (hydrocolloid) dressing every 5 days (PRN if edges roll)  5. Strict pressure offloading    Blistered burn to upper back:  1. CLeanse with sterile normal saline  2. Pat dry  3. Paint with cavilon  4. Cut Melgisorb to size and apply over wound bed  5. Apply mepilex to secure  6. Change every 3 days and prn    Skin Care Precautions / Pressure Injury Prevention:  1. Follow "Guidelines for Prevention of Pressure Ulcers in At Risk Patients"  These guidelines can be found on the [facility] by searching "Wound Care / Ostomy Resources"  2. " "Document wound assessment in [facility] using guidelines in Candace's "Assessment : Wound" procedure  3. Limit the amount of linen/underpad between patient and mattress surface to ONE fitted sheet and ONE covidien underpad - NO draw sheet/briefs.  4. Obtain Easi Cleans Foam Wipes for providing andreas care - avoid the use of wash cloths to areas affected by IAD.  5. Apply Clear Barrier Ointment to perineal / perirectal areas in a thin even layer to clean dry skin BID and after each episode of pericare  6. Apply sween 24 moisturizer cream to all dry skin after daily bath and prn  7. Obtain foam wedge from materials management to assist with maintaining proper position changes at least q 2hours and document actual position in EPIC q 2hours  8. Elevate heels off mattress on 2 separate pillows placed lengthwise under each leg supporting the leg from knee to ankle.  Document in EPIC flow sheet every 2 hours.  9. .Do NOT elevate HOB greater than 30 degrees unless contraindicated.  10. Remove SCD/Plexi Pulses/ANA's every 12 hours for 30 minutes and assess skin underneath these devices for breakdown          "

## 2017-11-14 PROBLEM — R65.20 SEVERE SEPSIS: Status: RESOLVED | Noted: 2017-01-01 | Resolved: 2017-01-01

## 2017-11-14 PROBLEM — A41.9 SEVERE SEPSIS: Status: RESOLVED | Noted: 2017-01-01 | Resolved: 2017-01-01

## 2017-11-14 PROBLEM — L89.153 PRESSURE ULCER OF COCCYGEAL REGION, STAGE 3: Status: ACTIVE | Noted: 2017-01-01

## 2017-11-14 PROBLEM — R53.1 GENERALIZED WEAKNESS: Status: ACTIVE | Noted: 2017-01-01

## 2017-11-14 PROBLEM — E11.9 TYPE 2 DIABETES MELLITUS: Chronic | Status: RESOLVED | Noted: 2017-01-01 | Resolved: 2017-01-01

## 2017-11-14 NOTE — PROGRESS NOTES
[facility] Hospital Medicine  Progress Note    Patient Name: Jeffrey Rosales  MRN: [id number]  Patient Class: IP- Inpatient   Admission Date: 11/12/2017  Length of Stay: 1 days  Attending Physician: Tommy Myles MD  Primary Care Provider: Provider [employer]        Subjective:     Principal Problem:Pneumonia due to infectious organism    HPI:  Patient is poor historian due to somnolence.  History obtained from patient's son, ED staff, and past medical record in Care Everywhere.  Mr. Rosales is a 67 yo male  with a PMHx of HIV/AIDS, HCV, HTN, HLD, DM II, DDD with chronic back pain, PUD, and h/o anal cancer, who presented to the ED with c/o generalized weakness, fatigue, and malaise that has progressively worsened over the past 2 weeks.  Associated subjective fevers, lethargy, and ~15-20 pound weight loss (unintentional) in past 3 months .  Denies any chest pain, palpitations, SOB/STREET, cough, orthopnea, PND, edema, weight gain/loss, decreased appetite, ABD pain or distention, N/V/D, melena/rectal bleeding, dysuria, hematuria, lightheadedness/dizziness, HA, visual disturbance, focal deficit, syncope, diaphoresis, or chills.  Patient's son also reports new abrasion to mid back that he noticed a few days ago with clear drainage.  Initial work-up in ED noted temp 100.6, , RR 23, O2 sat 92% on RA.  ABG resulted pH 7.4, CO2 31, O2 52, HCO3 19.7.  UA unremarkable.  CT head with no acute process.  CXR with bilateral infiltrates consistent with PCP pneumonia.  Hospital Medicine was consulted for admission.  Currently, patient appears comfortable, in NAD.  He remains lethargic, but easily aroused.    Hospital Course:  Pt admitted as probable PCP Pneumonia and started on IVF and IV Bactrim and Rocephin, feels a little better, fatigue and malaise improved. Just ate his whole dinner sitting in bed.     Interval History: Pt admitted as probable PCP Pneumonia and started on IVF and IV Bactrim and Rocephin, feels  a little better, fatigue and malaise improved. Just ate his whole dinner sitting in bed. Await LDH.    Review of Systems   Constitutional: Positive for chills, fatigue and fever. Negative for diaphoresis and unexpected weight change.        Malaise.   HENT: Negative for congestion, postnasal drip, rhinorrhea, sinus pressure, sore throat and trouble swallowing.    Eyes: Negative for photophobia and visual disturbance.   Respiratory: Negative for apnea, cough, chest tightness, shortness of breath and wheezing.    Cardiovascular: Negative for chest pain, palpitations and leg swelling.   Gastrointestinal: Negative for abdominal distention, abdominal pain, blood in stool, constipation, diarrhea, nausea and vomiting.   Endocrine: Negative for polydipsia, polyphagia and polyuria.   Genitourinary: Negative for decreased urine volume, difficulty urinating, dysuria, frequency, hematuria and urgency.   Musculoskeletal: Negative for arthralgias, back pain, gait problem, joint swelling, myalgias, neck pain and neck stiffness.   Skin: Positive for wound (back with drainage). Negative for pallor and rash.   Allergic/Immunologic: Positive for immunocompromised state.   Neurological: Negative for dizziness, seizures, syncope, facial asymmetry, speech difficulty, weakness, light-headedness, numbness and headaches.   Psychiatric/Behavioral: Negative for agitation, confusion, hallucinations and sleep disturbance. The patient is not nervous/anxious.    All other systems reviewed and are negative.    Objective:     Vital Signs (Most Recent):  Temp: 99 °F (37.2 °C) (11/13/17 1603)  Pulse: 86 (11/13/17 1603)  Resp: 18 (11/13/17 1603)  BP: 130/70 (11/13/17 1603)  SpO2: 99 % (11/13/17 1603) Vital Signs (24h Range):  Temp:  [97 °F (36.1 °C)-99.8 °F (37.7 °C)] 99 °F (37.2 °C)  Pulse:  [] 86  Resp:  [17-20] 18  SpO2:  [91 %-99 %] 99 %  BP: (130-145)/(62-72) 130/70     Weight: 95.8 kg (211 lb 3.2 oz)  Body mass index is 27.86  kg/m².    Intake/Output Summary (Last 24 hours) at 11/13/17 1830  Last data filed at 11/13/17 1400   Gross per 24 hour   Intake             3389 ml   Output                0 ml   Net             3389 ml      Physical Exam   Constitutional: He is oriented to person, place, and time. He appears well-developed and well-nourished. He appears lethargic. He is sleeping. He is easily aroused. No distress.   HENT:   Head: Normocephalic and atraumatic.   Eyes: Conjunctivae and EOM are normal. Pupils are equal, round, and reactive to light.   Neck: Normal range of motion. Neck supple. No JVD present.   Cardiovascular: Regular rhythm, S1 normal, S2 normal and intact distal pulses.   No extrasystoles are present. Tachycardia present.  Exam reveals no gallop.    No murmur heard.  Pulses:       Radial pulses are 2+ on the right side, and 2+ on the left side.        Dorsalis pedis pulses are 2+ on the right side, and 2+ on the left side.        Posterior tibial pulses are 2+ on the right side, and 2+ on the left side.   Pulmonary/Chest: Effort normal and breath sounds normal. No accessory muscle usage. No tachypnea. No respiratory distress. He has no decreased breath sounds. He has no wheezes. He has no rales.   Abdominal: Soft. Bowel sounds are normal. He exhibits no distension. There is no tenderness. There is no rebound and no guarding.   Musculoskeletal: Normal range of motion. He exhibits no edema, tenderness or deformity.   Neurological: He is oriented to person, place, and time and easily aroused. He appears lethargic. No cranial nerve deficit or sensory deficit. GCS eye subscore is 3. GCS verbal subscore is 5. GCS motor subscore is 6.   Skin: Skin is warm and dry. Capillary refill takes less than 2 seconds. Abrasion (midback, no drainage appreciated) noted. No rash noted. He is not diaphoretic. No erythema.   Psychiatric: His speech is normal. Thought content normal. His mood appears not anxious. He is not actively  hallucinating. Cognition and memory are normal. He does not exhibit a depressed mood. He is attentive.   Nursing note and vitals reviewed.      Significant Labs:   CBC:   Recent Labs  Lab 11/12/17  1540 11/13/17  0508   WBC 4.44 2.97*   HGB 13.9* 11.5*   HCT 41.0 33.5*   * 81*     All pertinent labs within the past 24 hours have been reviewed.  Imaging Results          CT Head Without Contrast (Final result)  Result time 11/12/17 18:19:51    Final result by Leonor Marques III, MD (11/12/17 18:19:51)                 Impression:       1.  No acute intracranial disease identified.    2.  Mild age related atrophy and minimal chronic microvascular ischemic change.        Electronically signed by: LEONOR MARQUES MD  Date:     11/12/17  Time:    18:19              Narrative:    Head CT without contrast    Clinical history: R41.82 Altered mental status, unspecified; generalized weakness    TECHNIQUE: Routine noncontrast axial head CT. All CT scans at this facility use dose modulation, iterative reconstruction, and/or weight based dosing when appropriate to reduce radiation dose to as low as reasonably achievable.    Comparison: none    FINDINGS: There is mild diffuse age-related atrophy.  Atherosclerotic calcifications of the intracranial arteries are noted. There is minimal periventricular white matter hypodensity, most consistent with chronic microvascular ischemic change.  There is no CT evidence of acute infarct, intracranial hemorrhage, mass-effect,  obstructive hydrocephalus or other acute disease. The visualized paranasal sinuses and mastoid air cells are clear. No calvarial fracture is identified.                             X-Ray Chest AP Portable (Final result)  Result time 11/12/17 16:46:20    Final result by Leonor Marques III, MD (11/12/17 16:46:20)                 Impression:     See above.        Electronically signed by: LEONOR MARQUES MD  Date:     11/12/17  Time:    16:46              Narrative:    XR  CHEST AP PORTABLE    Clinical history: sepsis.      Findings: Cardiomediastinal silhouette is within normal limits for AP technique. There is mild elevation the right diaphragm was mild adjacent right infrahilar vascular crowding. There are symmetric band-shaped opacities in the bilateral medial upper lobes, possibly infiltrates or vascular congestion.  Opacity in the retrocardiac left lung base could be related to underpenetration or infiltrate.  The remainder the lungs appear clear of active disease.                            Significant Imaging: I have reviewed all pertinent imaging results/findings within the past 24 hours.    Assessment/Plan:      * Pneumonia due to infectious organism    - Likely PCP pneumonia.  - Patient hypoxic with PO2 52 on admission; No respiratory distress.  - Will admit to Inpatient.  - IV Bactrim given high likelihood of PCP.  - Supplemental O2, Duonebs.  - Blood and sputum culture pending.  - Check CD4.  - I.D. consult in AM.  - await LDH level-- overall feels better        Severe sepsis    - Likely secondary to above.  - Patient hemodynamically stable, no shock.  - Initial lactic 2.2, WBC 4.4.  Follow serial trends.  - Blood, urine, and sputum cultures pending.  - IV Bactrim as above.  - Fluid resuscitation initiated in ED.  - Continuous IVF's with 0.9% NS @ 75 mL/hr.  Appears resolved        AIDS (acquired immunodeficiency syndrome), CD4 >200 and <500    - CD4 count of 247, viral load of 6.5 million on 8/10/2017.  - Repeat CD4.  - Patient has been off HAART since 4/2017 due to noncompliance.  - Follows with [facility] infectious disease.  - Consult I.D. in AM.  - await further results        Type 2 diabetes mellitus    - Glucose 56 on admission.  Will initiate hypoglycemic protocol.  - AccuChecks with SSI.  - Diabetic diet.  - Check HbA1c.        Essential hypertension    - BP controlled currently.  - Continue home amlodipine.  - Will hold home ACEi and diuretic to prevent hypotension  given severe sepsis.  Monitor BP trends, and resume as needed.          VTE Risk Mitigation         Ordered     enoxaparin injection 40 mg  Daily     Route:  Subcutaneous        11/12/17 2054     Medium Risk of VTE  Once      11/12/17 2054     Place sequential compression device  Until discontinued      11/12/17 2054              Tommy Myles MD  Department of Hospital Medicine   [facility]

## 2017-11-14 NOTE — ASSESSMENT & PLAN NOTE
The clinical features and imaging tests are atypical for PCP.  Will follow CD4 count,send  LDH,continue bactrim for now.

## 2017-11-14 NOTE — ASSESSMENT & PLAN NOTE
- CD4 count of 247, viral load of 6.5 million on 8/10/2017.  - Repeat CD4.  - Patient has been off HAART since 4/2017 due to noncompliance.  - Follows with [facility] infectious disease.  - Consult I.D. in AM.  - await further results  Now CD4 is 71.   Needs PCP Ppx as well

## 2017-11-14 NOTE — SUBJECTIVE & OBJECTIVE
Interval History: Pt admitted as probable PCP Pneumonia and started on IVF and IV Bactrim and Rocephin, feels a little better, fatigue and malaise improved. Just ate his whole dinner sitting in bed. Await LDH.    Review of Systems   Constitutional: Positive for chills, fatigue and fever. Negative for diaphoresis and unexpected weight change.        Malaise.   HENT: Negative for congestion, postnasal drip, rhinorrhea, sinus pressure, sore throat and trouble swallowing.    Eyes: Negative for photophobia and visual disturbance.   Respiratory: Negative for apnea, cough, chest tightness, shortness of breath and wheezing.    Cardiovascular: Negative for chest pain, palpitations and leg swelling.   Gastrointestinal: Negative for abdominal distention, abdominal pain, blood in stool, constipation, diarrhea, nausea and vomiting.   Endocrine: Negative for polydipsia, polyphagia and polyuria.   Genitourinary: Negative for decreased urine volume, difficulty urinating, dysuria, frequency, hematuria and urgency.   Musculoskeletal: Negative for arthralgias, back pain, gait problem, joint swelling, myalgias, neck pain and neck stiffness.   Skin: Positive for wound (back with drainage). Negative for pallor and rash.   Allergic/Immunologic: Positive for immunocompromised state.   Neurological: Negative for dizziness, seizures, syncope, facial asymmetry, speech difficulty, weakness, light-headedness, numbness and headaches.   Psychiatric/Behavioral: Negative for agitation, confusion, hallucinations and sleep disturbance. The patient is not nervous/anxious.    All other systems reviewed and are negative.    Objective:     Vital Signs (Most Recent):  Temp: 99 °F (37.2 °C) (11/13/17 1603)  Pulse: 86 (11/13/17 1603)  Resp: 18 (11/13/17 1603)  BP: 130/70 (11/13/17 1603)  SpO2: 99 % (11/13/17 1603) Vital Signs (24h Range):  Temp:  [97 °F (36.1 °C)-99.8 °F (37.7 °C)] 99 °F (37.2 °C)  Pulse:  [] 86  Resp:  [17-20] 18  SpO2:  [91 %-99 %]  99 %  BP: (130-145)/(62-72) 130/70     Weight: 95.8 kg (211 lb 3.2 oz)  Body mass index is 27.86 kg/m².    Intake/Output Summary (Last 24 hours) at 11/13/17 1830  Last data filed at 11/13/17 1400   Gross per 24 hour   Intake             3389 ml   Output                0 ml   Net             3389 ml      Physical Exam   Constitutional: He is oriented to person, place, and time. He appears well-developed and well-nourished. He appears lethargic. He is sleeping. He is easily aroused. No distress.   HENT:   Head: Normocephalic and atraumatic.   Eyes: Conjunctivae and EOM are normal. Pupils are equal, round, and reactive to light.   Neck: Normal range of motion. Neck supple. No JVD present.   Cardiovascular: Regular rhythm, S1 normal, S2 normal and intact distal pulses.   No extrasystoles are present. Tachycardia present.  Exam reveals no gallop.    No murmur heard.  Pulses:       Radial pulses are 2+ on the right side, and 2+ on the left side.        Dorsalis pedis pulses are 2+ on the right side, and 2+ on the left side.        Posterior tibial pulses are 2+ on the right side, and 2+ on the left side.   Pulmonary/Chest: Effort normal and breath sounds normal. No accessory muscle usage. No tachypnea. No respiratory distress. He has no decreased breath sounds. He has no wheezes. He has no rales.   Abdominal: Soft. Bowel sounds are normal. He exhibits no distension. There is no tenderness. There is no rebound and no guarding.   Musculoskeletal: Normal range of motion. He exhibits no edema, tenderness or deformity.   Neurological: He is oriented to person, place, and time and easily aroused. He appears lethargic. No cranial nerve deficit or sensory deficit. GCS eye subscore is 3. GCS verbal subscore is 5. GCS motor subscore is 6.   Skin: Skin is warm and dry. Capillary refill takes less than 2 seconds. Abrasion (midback, no drainage appreciated) noted. No rash noted. He is not diaphoretic. No erythema.   Psychiatric: His  speech is normal. Thought content normal. His mood appears not anxious. He is not actively hallucinating. Cognition and memory are normal. He does not exhibit a depressed mood. He is attentive.   Nursing note and vitals reviewed.      Significant Labs:   CBC:   Recent Labs  Lab 11/12/17  1540 11/13/17  0508   WBC 4.44 2.97*   HGB 13.9* 11.5*   HCT 41.0 33.5*   * 81*     All pertinent labs within the past 24 hours have been reviewed.  Imaging Results          CT Head Without Contrast (Final result)  Result time 11/12/17 18:19:51    Final result by Leonor Marques III, MD (11/12/17 18:19:51)                 Impression:       1.  No acute intracranial disease identified.    2.  Mild age related atrophy and minimal chronic microvascular ischemic change.        Electronically signed by: LEONOR MARQUES MD  Date:     11/12/17  Time:    18:19              Narrative:    Head CT without contrast    Clinical history: R41.82 Altered mental status, unspecified; generalized weakness    TECHNIQUE: Routine noncontrast axial head CT. All CT scans at this facility use dose modulation, iterative reconstruction, and/or weight based dosing when appropriate to reduce radiation dose to as low as reasonably achievable.    Comparison: none    FINDINGS: There is mild diffuse age-related atrophy.  Atherosclerotic calcifications of the intracranial arteries are noted. There is minimal periventricular white matter hypodensity, most consistent with chronic microvascular ischemic change.  There is no CT evidence of acute infarct, intracranial hemorrhage, mass-effect,  obstructive hydrocephalus or other acute disease. The visualized paranasal sinuses and mastoid air cells are clear. No calvarial fracture is identified.                             X-Ray Chest AP Portable (Final result)  Result time 11/12/17 16:46:20    Final result by Leonor Marques III, MD (11/12/17 16:46:20)                 Impression:     See above.        Electronically  signed by: LEONOR MARQUES MD  Date:     11/12/17  Time:    16:46              Narrative:    XR CHEST AP PORTABLE    Clinical history: sepsis.      Findings: Cardiomediastinal silhouette is within normal limits for AP technique. There is mild elevation the right diaphragm was mild adjacent right infrahilar vascular crowding. There are symmetric band-shaped opacities in the bilateral medial upper lobes, possibly infiltrates or vascular congestion.  Opacity in the retrocardiac left lung base could be related to underpenetration or infiltrate.  The remainder the lungs appear clear of active disease.                            Significant Imaging: I have reviewed all pertinent imaging results/findings within the past 24 hours.
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
## 2017-11-14 NOTE — PROGRESS NOTES
[facility] Hospital Medicine  Progress Note    Patient Name: Jeffrey Rosales  MRN: [id number]  Patient Class: IP- Inpatient   Admission Date: 11/12/2017  Length of Stay: 2 days  Attending Physician: Tommy Myles MD  Primary Care Provider: Provider Notinsystem        Subjective:     Principal Problem:Pneumonia due to infectious organism    HPI:  Patient is poor historian due to somnolence.  History obtained from patient's son, ED staff, and past medical record in Care Everywhere.  Mr. Rosales is a 65 yo male  with a PMHx of HIV/AIDS, HCV, HTN, HLD, DM II, DDD with chronic back pain, PUD, and h/o anal cancer, who presented to the ED with c/o generalized weakness, fatigue, and malaise that has progressively worsened over the past 2 weeks.  Associated subjective fevers, lethargy, and ~15-20 pound weight loss (unintentional) in past 3 months .  Denies any chest pain, palpitations, SOB/STREET, cough, orthopnea, PND, edema, weight gain/loss, decreased appetite, ABD pain or distention, N/V/D, melena/rectal bleeding, dysuria, hematuria, lightheadedness/dizziness, HA, visual disturbance, focal deficit, syncope, diaphoresis, or chills.  Patient's son also reports new abrasion to mid back that he noticed a few days ago with clear drainage.  Initial work-up in ED noted temp 100.6, , RR 23, O2 sat 92% on RA.  ABG resulted pH 7.4, CO2 31, O2 52, HCO3 19.7.  UA unremarkable.  CT head with no acute process.  CXR with bilateral infiltrates consistent with PCP pneumonia.  Hospital Medicine was consulted for admission.  Currently, patient appears comfortable, in NAD.  He remains lethargic, but easily aroused.    Hospital Course:  Pt admitted as probable PCP Pneumonia and started on IVF and IV Bactrim and Rocephin, feels a little better, fatigue and malaise improved. Just ate his whole dinner sitting in bed. CD4 count is now 71, suggestive of AIDS.    Interval History: continues to feel better but still weak, no new  issues. Blood and urine Cx NGTD.    Review of Systems   Unable to perform ROS: Other (poorhistorian -he has back pain,denies fever or chills .)     Objective:     Vital Signs (Most Recent):  Temp: 97.4 °F (36.3 °C) (11/14/17 1315)  Pulse: 95 (11/14/17 1315)  Resp: 20 (11/14/17 1315)  BP: (!) 146/70 (11/14/17 1315)  SpO2: (!) 93 % (11/14/17 1315) Vital Signs (24h Range):  Temp:  [97.1 °F (36.2 °C)-98 °F (36.7 °C)] 97.4 °F (36.3 °C)  Pulse:  [81-96] 95  Resp:  [16-20] 20  SpO2:  [92 %-98 %] 93 %  BP: (125-146)/(63-76) 146/70     Weight: 98.3 kg (216 lb 11.4 oz)  Body mass index is 28.59 kg/m².    Intake/Output Summary (Last 24 hours) at 11/14/17 1638  Last data filed at 11/14/17 1210   Gross per 24 hour   Intake          4349.17 ml   Output                0 ml   Net          4349.17 ml      Physical Exam   Constitutional: He appears well-developed and well-nourished. He appears lethargic. He is sleeping. He is easily aroused. No distress.   HENT:   Head: Normocephalic and atraumatic.   Eyes: Conjunctivae and EOM are normal. Pupils are equal, round, and reactive to light.   Neck: Normal range of motion. Neck supple. No JVD present.   Cardiovascular: Regular rhythm, S1 normal, S2 normal and intact distal pulses.   No extrasystoles are present. Tachycardia present.  Exam reveals no gallop.    No murmur heard.  Pulses:       Radial pulses are 2+ on the right side, and 2+ on the left side.        Dorsalis pedis pulses are 2+ on the right side, and 2+ on the left side.        Posterior tibial pulses are 2+ on the right side, and 2+ on the left side.   Pulmonary/Chest: Effort normal and breath sounds normal. No accessory muscle usage. No tachypnea. No respiratory distress. He has no decreased breath sounds. He has no wheezes. He has no rales.   Abdominal: Soft. Bowel sounds are normal. He exhibits no distension. There is no tenderness. There is no rebound and no guarding.   Musculoskeletal: Normal range of motion. He  exhibits no edema, tenderness or deformity.   Neurological: He is easily aroused. He appears lethargic. No cranial nerve deficit or sensory deficit. GCS eye subscore is 3. GCS verbal subscore is 5. GCS motor subscore is 6.   Skin: Skin is warm and dry. Capillary refill takes less than 2 seconds. Abrasion (midback, no drainage appreciated) noted. No rash noted. He is not diaphoretic. No erythema.   Psychiatric: His speech is normal. Thought content normal. His mood appears not anxious. He is not actively hallucinating. Cognition and memory are normal. He does not exhibit a depressed mood. He is attentive.   Nursing note and vitals reviewed.      Significant Labs:     BMP:   Recent Labs  Lab 11/14/17  0521   *   *   K 4.1      CO2 17*   BUN 13   CREATININE 0.7   CALCIUM 9.2   MG 1.8     CBC:   Recent Labs  Lab 11/13/17  0508 11/14/17  0521   WBC 2.97* 1.47*   HGB 11.5* 11.7*   HCT 33.5* 33.8*   PLT 81* 86*     CMP:   Recent Labs  Lab 11/13/17  0508 11/14/17  0521   * 132*   K 3.7 4.1    108   CO2 20* 17*   GLU 92 241*   BUN 10 13   CREATININE 0.6 0.7   CALCIUM 8.8 9.2   PROT 5.7* 6.0   ALBUMIN 1.6* 1.7*   BILITOT 0.8 0.5   ALKPHOS 105 106   AST 74* 57*   ALT 32 33   ANIONGAP 4* 7*   EGFRNONAA >60 >60       TSH:   Recent Labs  Lab 11/12/17  1540   TSH 0.391*     All pertinent labs within the past 24 hours have been reviewed.    Significant Imaging: I have reviewed all pertinent imaging results/findings within the past 24 hours.    Assessment/Plan:      * PCP Pneumonia withy acute Hypoxemic Resp Failure     - Likely PCP pneumonia.  - Patient hypoxic with PO2 52 on admission; No respiratory distress.  - Will admit to Inpatient.  - IV Bactrim given high likelihood of PCP.  - Supplemental O2, Duonebs.  - Blood and sputum culture pending.  - Check CD4.  - I.D. consult in AM.  - await LDH level-- overall feels better        AIDS (acquired immunodeficiency syndrome), CD4 >200 and <500    - CD4  count of 247, viral load of 6.5 million on 8/10/2017.  - Repeat CD4.  - Patient has been off HAART since 4/2017 due to noncompliance.  - Follows with [facility] infectious disease.  - Consult I.D. in AM.  - await further results  Now CD4 is 71.   Needs PCP Ppx as well        Generalized weakness    Sec to ch illness and PCP pneumonia  Order OT/ PT, banana bag          Essential hypertension    - BP controlled currently.  - Continue home amlodipine.  - Will hold home ACEi and diuretic to prevent hypotension given severe sepsis.  Monitor BP trends, and resume as needed.          VTE Risk Mitigation         Ordered     enoxaparin injection 40 mg  Daily     Route:  Subcutaneous        11/12/17 2054     Medium Risk of VTE  Once      11/12/17 2054     Place sequential compression device  Until discontinued      11/12/17 2054              Tommy Myles MD  Department of Hospital Medicine   [facility]

## 2017-11-14 NOTE — ASSESSMENT & PLAN NOTE
- CD4 count of 247, viral load of 6.5 million on 8/10/2017.  - Repeat CD4.  - Patient has been off HAART since 4/2017 due to noncompliance.  - Follows with [facility] infectious disease.  - Consult I.D. in AM.  - await further results

## 2017-11-14 NOTE — ASSESSMENT & PLAN NOTE
Latest cd4 count -08/2017 -247 with viral load of 6.4 million .    Will repeat CD4 count and will consider placement /home health to ensure better compliance with meds.    Dr Beaver at \[facility]\"" ID stopped his antiretrovirals during the last visit in August 2017.He is very poorly compliant with therapy

## 2017-11-14 NOTE — ASSESSMENT & PLAN NOTE
- Likely secondary to above.  - Patient hemodynamically stable, no shock.  - Initial lactic 2.2, WBC 4.4.  Follow serial trends.  - Blood, urine, and sputum cultures pending.  - IV Bactrim as above.  - Fluid resuscitation initiated in ED.  - Continuous IVF's with 0.9% NS @ 75 mL/hr.  Appears resolved

## 2017-11-14 NOTE — ASSESSMENT & PLAN NOTE
- Glucose 56 on admission.  Will initiate hypoglycemic protocol.  - AccuChecks with SSI.  - Diabetic diet.  - Check HbA1c.-- normal 4.9 no DM

## 2017-11-14 NOTE — PLAN OF CARE
Problem: Patient Care Overview  Goal: Plan of Care Review  Continue IS with deep breathing and coughing.

## 2017-11-14 NOTE — PLAN OF CARE
Problem: Patient Care Overview  Goal: Plan of Care Review  Outcome: Ongoing (interventions implemented as appropriate)  Pt remained free of injury during shift, stable condition, pain adequately controlled, no acute distress, receiving IV fluids, receiving antibiotics, blood glucose monitoring performed, on cardiac monitoring (SR, HR 70-80s), turned independently, dressing to back/sacral area CDI, and will continue to monitor. 24hr chart review performed.

## 2017-11-14 NOTE — PROGRESS NOTES
[facility]  Infectious Disease  Progress Note    Patient Name: Jeffrey Rosales  MRN: [id number]  Admission Date: 11/12/2017  Length of Stay: 2 days  Attending Physician: Tommy Myles MD  Primary Care Provider: Provider Notinsystem    Isolation Status: No active isolations  Assessment/Plan:      * Pneumonia due to infectious organism    The clinical features and imaging tests are atypical for PCP.  Will follow CD4 count,send  LDH,continue bactrim for now.        History of malignant neoplasm of anus    Out patient oncology follow up         Type 2 diabetes mellitus    Insulin sliding scale as per primary team        AIDS (acquired immunodeficiency syndrome), CD4 >200 and <500    Latest cd4 count -08/2017 -247 with viral load of 6.4 million .    Will repeat CD4 count and will consider placement /home health to ensure better compliance with meds.    Dr Beaver at \[facility]\"" ID stopped his antiretrovirals during the last visit in August 2017.He is very poorly compliant with therapy             Anticipated Disposition:     Thank you for your consult. I will follow-up with patient. Please contact us if you have any additional questions.    Jeffrey López MD  Infectious Disease  [facility]    Subjective:     Principal Problem:Pneumonia due to infectious organism    HPI: Patient is poor historian due to somnolence.  History obtained from patient's daughter , ED staff, and past medical record in Care Everywhere.  66 year old  male  with history of of HIV/AIDS-cd4 count on 08/2017-247,cd4% 28 ,HIV viral load -6.4 million , HCV, HTN, HLD, DM II, DDD with chronic back pain, PUD, and h/o anal cancer, who presented to the ED with c/o generalized weakness, fatigue, and malaise that has progressively worsened over the past 2 weeks.  Associated subjective fevers, lethargy, and ~15-20 pound weight loss (unintentional) in past 3 months .  .  Patient's son also reports new abrasion to mid back that he noticed a few  days ago with clear drainage.  Initial work-up in ED noted temp 100.6, , RR 23, O2 sat 92% on RA.  CT head with no acute process.  CTA of the chest -11/01-  2.  1.6 cm right lower lobe pulmonary nodule.    3.  Scattered pulmonary emphysema changes.    From the last ID follow up note on 08/27-[facility] reported that he has not picked up antiretrovirals in months. Last refill date for Tivicay February 2017 and Truvada April 2017.  He follows up with [facility] oncology for anal cancer.  At that visit ,his HAART therapy was stopped.    Past Medical History:   Diagnosis Date    AIDS     Chronic back pain     Chronic hepatitis C     DDD (degenerative disc disease), lumbar     Diabetes mellitus     Hepatitis B antibody positive     History of anal cancer     HIV (human immunodeficiency virus infection)     Hypertension     Neuropathy     Obesity     Syphilis     Vitreous detachment of right eye        Past Surgical History:   Procedure Laterality Date    anal biopsy      COLONOSCOPY W/ BIOPSIES      ESOPHAGOGASTRODUODENOSCOPY      EYE SURGERY      MEDIPORT INSERTION, SINGLE      MEDIPORT REMOVAL         Review of patient's allergies indicates:  No Known Allergies    Medications:  Prescriptions Prior to Admission   Medication Sig    amLODIPine (NORVASC) 5 MG tablet Take 5 mg by mouth.    amoxicillin (AMOXIL) 500 MG capsule TAKE 1 CAP BY MOUTH TWICE A DAY    aspirin (ECOTRIN) 81 MG EC tablet Take 81 mg by mouth.    bacitracin zinc 500 unit/gram Pack 1 packet.    blood sugar diagnostic Strp 1 each.    chlorhexidine (PERIDEX) 0.12 % solution RINSE WITH 1/2 CAP 2 TIMES DAILY    cyclobenzaprine (FLEXERIL) 10 MG tablet Take 10 mg by mouth.    enalapril (VASOTEC) 20 MG tablet Take 20 mg by mouth.    gabapentin (NEURONTIN) 800 MG tablet Take 800 mg by mouth.    insulin NPH-insulin regular, 70/30, (NOVOLIN 70/30) 100 unit/mL (70-30) injection Inject subcutaneously 60 units in the morning and 40 units in the  "evening.    insulin syringe-needle U-100 (BD INSULIN SYRINGE ULTRA-FINE) 1/2 mL 31 gauge x 15/64" Syrg USE TO INJECT INSULIN TWICE DAILY.    insulin syringe-needle U-100 1 mL 28 gauge x 1/2" Syrg Use to inject insulin twice daily.    lancets Misc Check blood sugar 3 times daily    mv-min-folic acid-lutein 500-250 mcg Chew Take 1 tablet by mouth.    naproxen (NAPROSYN) 500 MG tablet TAKE 1 TABLET BY MOUTH TWICE A DAY AS NEEDED FOR BACK PAIN    nortriptyline (PAMELOR) 10 MG capsule TAKE 2 CAPS BY MOUTH AT BEDTIME    omeprazole (PRILOSEC) 40 MG capsule TAKE 1 CAPSULE BY MOUTH DAILY.    pravastatin (PRAVACHOL) 20 MG tablet TAKE 1 TABLET BY MOUTH DAILY.     Antibiotics     Start     Stop Route Frequency Ordered    11/12/17 2015  sulfamethoxazole-trimethoprim 400-80 mg/5 mL (BACTRIM) 635 mg in dextrose 5 % 635 mL IVPB      -- IV Every 8 hours (non-standard times) 11/12/17 1913        Antifungals     None        Antivirals     None             There is no immunization history on file for this patient.    Family History     Problem Relation (Age of Onset)    Diabetes Sister, Brother    No Known Problems Mother, Father        Social History     Social History    Marital status:      Spouse name: N/A    Number of children: N/A    Years of education: N/A     Social History Main Topics    Smoking status: Former Smoker     Quit date: 1999    Smokeless tobacco: Never Used    Alcohol use Yes      Comment: occasional    Drug use: No    Sexual activity: No     Other Topics Concern    None     Social History Narrative    None     Review of Systems   Unable to perform ROS: Other (poorhistorian -he has back pain,denies fever or chills .)     Objective:     Vital Signs (Most Recent):  Temp: 97.7 °F (36.5 °C) (11/14/17 0444)  Pulse: 85 (11/14/17 0444)  Resp: 20 (11/14/17 0444)  BP: 128/63 (11/14/17 0444)  SpO2: 98 % (11/14/17 0444) Vital Signs (24h Range):  Temp:  [97.6 °F (36.4 °C)-99 °F (37.2 °C)] 97.7 °F (36.5 " °C)  Pulse:  [81-98] 85  Resp:  [16-20] 20  SpO2:  [91 %-99 %] 98 %  BP: (128-141)/(63-76) 128/63     Weight: 98.3 kg (216 lb 11.4 oz)  Body mass index is 28.59 kg/m².    Estimated Creatinine Clearance: 147.5 mL/min (based on SCr of 0.6 mg/dL).    Physical Exam   Constitutional: He appears well-developed and well-nourished. He appears lethargic. He is sleeping. He is easily aroused. No distress.   HENT:   Head: Normocephalic and atraumatic.   Eyes: Conjunctivae and EOM are normal. Pupils are equal, round, and reactive to light.   Neck: Normal range of motion. Neck supple. No JVD present.   Cardiovascular: Regular rhythm, S1 normal, S2 normal and intact distal pulses.   No extrasystoles are present. Tachycardia present.  Exam reveals no gallop.    No murmur heard.  Pulses:       Radial pulses are 2+ on the right side, and 2+ on the left side.        Dorsalis pedis pulses are 2+ on the right side, and 2+ on the left side.        Posterior tibial pulses are 2+ on the right side, and 2+ on the left side.   Pulmonary/Chest: Effort normal and breath sounds normal. No accessory muscle usage. No tachypnea. No respiratory distress. He has no decreased breath sounds. He has no wheezes. He has no rales.   Abdominal: Soft. Bowel sounds are normal. He exhibits no distension. There is no tenderness. There is no rebound and no guarding.   Musculoskeletal: Normal range of motion. He exhibits no edema, tenderness or deformity.   Neurological: He is easily aroused. He appears lethargic. No cranial nerve deficit or sensory deficit. GCS eye subscore is 3. GCS verbal subscore is 5. GCS motor subscore is 6.   Skin: Skin is warm and dry. Capillary refill takes less than 2 seconds. Abrasion (midback, no drainage appreciated) noted. No rash noted. He is not diaphoretic. No erythema.   Psychiatric: His speech is normal. Thought content normal. His mood appears not anxious. He is not actively hallucinating. Cognition and memory are normal. He  does not exhibit a depressed mood. He is attentive.   Nursing note and vitals reviewed.      Significant Labs:   Blood Culture:   Recent Labs  Lab 11/12/17  1540 11/12/17  1555   LABBLOO No Growth to date  No Growth to date No Growth to date  No Growth to date     BMP:   Recent Labs  Lab 11/13/17  0508   GLU 92   *   K 3.7      CO2 20*   BUN 10   CREATININE 0.6   CALCIUM 8.8   MG 1.5*     CBC:   Recent Labs  Lab 11/12/17  1540 11/13/17  0508   WBC 4.44 2.97*   HGB 13.9* 11.5*   HCT 41.0 33.5*   * 81*     All pertinent labs within the past 24 hours have been reviewed.    Significant Imaging: I have reviewed all pertinent imaging results/findings within the past 24 hours.

## 2017-11-14 NOTE — PLAN OF CARE
Problem: Patient Care Overview  Goal: Plan of Care Review  Outcome: Ongoing (interventions implemented as appropriate)  Pt remains free from injury/falls. Fall precautions in place. Pt turns independently in bed when laying down and is able to pull himself up to sit on the side of the bed for meals, etc. Pt tolerating diabetic diet. Accu checks done as ordered with supplemental insulin given as needed. Denies pain and nausea. IV antibiotics given as ordered. Dressing clean dry and intact to pt's back and sacrum. Pt able to verbalize needs/wants. Bed in low, locked position, call light and personal items within reach. VSS. Will cont to monitor.

## 2017-11-14 NOTE — SUBJECTIVE & OBJECTIVE
"Past Medical History:   Diagnosis Date    AIDS     Chronic back pain     Chronic hepatitis C     DDD (degenerative disc disease), lumbar     Diabetes mellitus     Hepatitis B antibody positive     History of anal cancer     HIV (human immunodeficiency virus infection)     Hypertension     Neuropathy     Obesity     Syphilis     Vitreous detachment of right eye        Past Surgical History:   Procedure Laterality Date    anal biopsy      COLONOSCOPY W/ BIOPSIES      ESOPHAGOGASTRODUODENOSCOPY      EYE SURGERY      MEDIPORT INSERTION, SINGLE      MEDIPORT REMOVAL         Review of patient's allergies indicates:  No Known Allergies    Medications:  Prescriptions Prior to Admission   Medication Sig    amLODIPine (NORVASC) 5 MG tablet Take 5 mg by mouth.    amoxicillin (AMOXIL) 500 MG capsule TAKE 1 CAP BY MOUTH TWICE A DAY    aspirin (ECOTRIN) 81 MG EC tablet Take 81 mg by mouth.    bacitracin zinc 500 unit/gram Pack 1 packet.    blood sugar diagnostic Strp 1 each.    chlorhexidine (PERIDEX) 0.12 % solution RINSE WITH 1/2 CAP 2 TIMES DAILY    cyclobenzaprine (FLEXERIL) 10 MG tablet Take 10 mg by mouth.    enalapril (VASOTEC) 20 MG tablet Take 20 mg by mouth.    gabapentin (NEURONTIN) 800 MG tablet Take 800 mg by mouth.    insulin NPH-insulin regular, 70/30, (NOVOLIN 70/30) 100 unit/mL (70-30) injection Inject subcutaneously 60 units in the morning and 40 units in the evening.    insulin syringe-needle U-100 (BD INSULIN SYRINGE ULTRA-FINE) 1/2 mL 31 gauge x 15/64" Syrg USE TO INJECT INSULIN TWICE DAILY.    insulin syringe-needle U-100 1 mL 28 gauge x 1/2" Syrg Use to inject insulin twice daily.    lancets Misc Check blood sugar 3 times daily    mv-min-folic acid-lutein 500-250 mcg Chew Take 1 tablet by mouth.    naproxen (NAPROSYN) 500 MG tablet TAKE 1 TABLET BY MOUTH TWICE A DAY AS NEEDED FOR BACK PAIN    nortriptyline (PAMELOR) 10 MG capsule TAKE 2 CAPS BY MOUTH AT BEDTIME    " omeprazole (PRILOSEC) 40 MG capsule TAKE 1 CAPSULE BY MOUTH DAILY.    pravastatin (PRAVACHOL) 20 MG tablet TAKE 1 TABLET BY MOUTH DAILY.     Antibiotics     Start     Stop Route Frequency Ordered    11/12/17 2015  sulfamethoxazole-trimethoprim 400-80 mg/5 mL (BACTRIM) 635 mg in dextrose 5 % 635 mL IVPB      -- IV Every 8 hours (non-standard times) 11/12/17 1913        Antifungals     None        Antivirals     None             There is no immunization history on file for this patient.    Family History     Problem Relation (Age of Onset)    Diabetes Sister, Brother    No Known Problems Mother, Father        Social History     Social History    Marital status:      Spouse name: N/A    Number of children: N/A    Years of education: N/A     Social History Main Topics    Smoking status: Former Smoker     Quit date: 1999    Smokeless tobacco: Never Used    Alcohol use Yes      Comment: occasional    Drug use: No    Sexual activity: No     Other Topics Concern    None     Social History Narrative    None     Review of Systems   Unable to perform ROS: Other (poorhistorian -he has back pain,denies fever or chills .)     Objective:     Vital Signs (Most Recent):  Temp: 97.7 °F (36.5 °C) (11/14/17 0444)  Pulse: 85 (11/14/17 0444)  Resp: 20 (11/14/17 0444)  BP: 128/63 (11/14/17 0444)  SpO2: 98 % (11/14/17 0444) Vital Signs (24h Range):  Temp:  [97.6 °F (36.4 °C)-99 °F (37.2 °C)] 97.7 °F (36.5 °C)  Pulse:  [81-98] 85  Resp:  [16-20] 20  SpO2:  [91 %-99 %] 98 %  BP: (128-141)/(63-76) 128/63     Weight: 98.3 kg (216 lb 11.4 oz)  Body mass index is 28.59 kg/m².    Estimated Creatinine Clearance: 147.5 mL/min (based on SCr of 0.6 mg/dL).    Physical Exam   Constitutional: He appears well-developed and well-nourished. He appears lethargic. He is sleeping. He is easily aroused. No distress.   HENT:   Head: Normocephalic and atraumatic.   Eyes: Conjunctivae and EOM are normal. Pupils are equal, round, and reactive to  light.   Neck: Normal range of motion. Neck supple. No JVD present.   Cardiovascular: Regular rhythm, S1 normal, S2 normal and intact distal pulses.   No extrasystoles are present. Tachycardia present.  Exam reveals no gallop.    No murmur heard.  Pulses:       Radial pulses are 2+ on the right side, and 2+ on the left side.        Dorsalis pedis pulses are 2+ on the right side, and 2+ on the left side.        Posterior tibial pulses are 2+ on the right side, and 2+ on the left side.   Pulmonary/Chest: Effort normal and breath sounds normal. No accessory muscle usage. No tachypnea. No respiratory distress. He has no decreased breath sounds. He has no wheezes. He has no rales.   Abdominal: Soft. Bowel sounds are normal. He exhibits no distension. There is no tenderness. There is no rebound and no guarding.   Musculoskeletal: Normal range of motion. He exhibits no edema, tenderness or deformity.   Neurological: He is easily aroused. He appears lethargic. No cranial nerve deficit or sensory deficit. GCS eye subscore is 3. GCS verbal subscore is 5. GCS motor subscore is 6.   Skin: Skin is warm and dry. Capillary refill takes less than 2 seconds. Abrasion (midback, no drainage appreciated) noted. No rash noted. He is not diaphoretic. No erythema.   Psychiatric: His speech is normal. Thought content normal. His mood appears not anxious. He is not actively hallucinating. Cognition and memory are normal. He does not exhibit a depressed mood. He is attentive.   Nursing note and vitals reviewed.      Significant Labs:   Blood Culture:   Recent Labs  Lab 11/12/17  1540 11/12/17  1555   LABBLOO No Growth to date  No Growth to date No Growth to date  No Growth to date     BMP:   Recent Labs  Lab 11/13/17  0508   GLU 92   *   K 3.7      CO2 20*   BUN 10   CREATININE 0.6   CALCIUM 8.8   MG 1.5*     CBC:   Recent Labs  Lab 11/12/17  1540 11/13/17  0508   WBC 4.44 2.97*   HGB 13.9* 11.5*   HCT 41.0 33.5*   * 81*      All pertinent labs within the past 24 hours have been reviewed.    Significant Imaging: I have reviewed all pertinent imaging results/findings within the past 24 hours.

## 2017-11-14 NOTE — ASSESSMENT & PLAN NOTE
- Likely PCP pneumonia.  - Patient hypoxic with PO2 52 on admission; No respiratory distress.  - Will admit to Inpatient.  - IV Bactrim given high likelihood of PCP.  - Supplemental O2, Duonebs.  - Blood and sputum culture pending.  - Check CD4.  - I.D. consult in AM.  - await LDH level-- overall feels better

## 2017-11-14 NOTE — SUBJECTIVE & OBJECTIVE
Interval History: continues to feel better but still weak, no new issues. Blood and urine Cx NGTD.    Review of Systems   Unable to perform ROS: Other (poorhistorian -he has back pain,denies fever or chills .)     Objective:     Vital Signs (Most Recent):  Temp: 97.4 °F (36.3 °C) (11/14/17 1315)  Pulse: 95 (11/14/17 1315)  Resp: 20 (11/14/17 1315)  BP: (!) 146/70 (11/14/17 1315)  SpO2: (!) 93 % (11/14/17 1315) Vital Signs (24h Range):  Temp:  [97.1 °F (36.2 °C)-98 °F (36.7 °C)] 97.4 °F (36.3 °C)  Pulse:  [81-96] 95  Resp:  [16-20] 20  SpO2:  [92 %-98 %] 93 %  BP: (125-146)/(63-76) 146/70     Weight: 98.3 kg (216 lb 11.4 oz)  Body mass index is 28.59 kg/m².    Intake/Output Summary (Last 24 hours) at 11/14/17 1638  Last data filed at 11/14/17 1210   Gross per 24 hour   Intake          4349.17 ml   Output                0 ml   Net          4349.17 ml      Physical Exam   Constitutional: He appears well-developed and well-nourished. He appears lethargic. He is sleeping. He is easily aroused. No distress.   HENT:   Head: Normocephalic and atraumatic.   Eyes: Conjunctivae and EOM are normal. Pupils are equal, round, and reactive to light.   Neck: Normal range of motion. Neck supple. No JVD present.   Cardiovascular: Regular rhythm, S1 normal, S2 normal and intact distal pulses.   No extrasystoles are present. Tachycardia present.  Exam reveals no gallop.    No murmur heard.  Pulses:       Radial pulses are 2+ on the right side, and 2+ on the left side.        Dorsalis pedis pulses are 2+ on the right side, and 2+ on the left side.        Posterior tibial pulses are 2+ on the right side, and 2+ on the left side.   Pulmonary/Chest: Effort normal and breath sounds normal. No accessory muscle usage. No tachypnea. No respiratory distress. He has no decreased breath sounds. He has no wheezes. He has no rales.   Abdominal: Soft. Bowel sounds are normal. He exhibits no distension. There is no tenderness. There is no rebound and  no guarding.   Musculoskeletal: Normal range of motion. He exhibits no edema, tenderness or deformity.   Neurological: He is easily aroused. He appears lethargic. No cranial nerve deficit or sensory deficit. GCS eye subscore is 3. GCS verbal subscore is 5. GCS motor subscore is 6.   Skin: Skin is warm and dry. Capillary refill takes less than 2 seconds. Abrasion (midback, no drainage appreciated) noted. No rash noted. He is not diaphoretic. No erythema.   Psychiatric: His speech is normal. Thought content normal. His mood appears not anxious. He is not actively hallucinating. Cognition and memory are normal. He does not exhibit a depressed mood. He is attentive.   Nursing note and vitals reviewed.      Significant Labs:     BMP:   Recent Labs  Lab 11/14/17  0521   *   *   K 4.1      CO2 17*   BUN 13   CREATININE 0.7   CALCIUM 9.2   MG 1.8     CBC:   Recent Labs  Lab 11/13/17  0508 11/14/17  0521   WBC 2.97* 1.47*   HGB 11.5* 11.7*   HCT 33.5* 33.8*   PLT 81* 86*     CMP:   Recent Labs  Lab 11/13/17  0508 11/14/17  0521   * 132*   K 3.7 4.1    108   CO2 20* 17*   GLU 92 241*   BUN 10 13   CREATININE 0.6 0.7   CALCIUM 8.8 9.2   PROT 5.7* 6.0   ALBUMIN 1.6* 1.7*   BILITOT 0.8 0.5   ALKPHOS 105 106   AST 74* 57*   ALT 32 33   ANIONGAP 4* 7*   EGFRNONAA >60 >60       TSH:   Recent Labs  Lab 11/12/17  1540   TSH 0.391*     All pertinent labs within the past 24 hours have been reviewed.    Significant Imaging: I have reviewed all pertinent imaging results/findings within the past 24 hours.

## 2017-11-14 NOTE — HOSPITAL COURSE
Pt admitted with Acute hypoxic respiratory failure and PCP Pneumonia and started on IVF and IV Bactrim and IV steroids.  CD4 count is now 71, Viral load is 6.4 million. LDH elevated. There is documented noncompliance with HAART per Dr. Pete in care everywhere. Dr Beaver at [facility] ID stopped his antiretrovirals during the last visit in August 2017.     Pt's confusion is worsening with hallucinations and strange behavior- but still often answering questions appropriately -Dr. parkinson states behavior is concerning for FELICITY virus (NEGATIVE) and Neuro syphillis. Pt reports memory problems and tremors x 2 weeks. LP cultures for VDRL proved to be negative for Neuro syphillis   Dr. Parkinson  also recommended Azithromycin ppx     Offered nursing home placement to assist with med compliance and for therapy.  Discussed grave prognosis without compliance with medications- pt verbalized understanding and declined Hospice. He reported he will be compliant with HAART and antibiotics. Dr. Parkinson suspects HIV resistence to HAART- will refer pt back to his ID. With pt's permission I discussed plan of care and pt status with pt's daughter Sariah who verbalized understanding. Daughter and pt's ex wife request SNF - Initially, pt declined but later agreed.     Pt continues to hallucinate- strange behavior escalating - refuses clothes and is urinating on himself. Fatigue and malaise improved. Still very weak. RPR Positive- Dr. Parkinson recommends empiric treatment with IV penicillin.  CSF VDRL proved negative for Neuro Syphillis however, he received Bicillin for treatment of presumed latent syphillis.    He was seen and examined and determined to be safe and stable for discharge. He was discharged to [facility] to complete ABX for PCP pneumonia and PT/OT. He was advised to follow up with PCP and Infectious Ds after discharge from SNF.

## 2017-11-15 PROBLEM — G93.40 ENCEPHALOPATHY ACUTE: Status: ACTIVE | Noted: 2017-01-01

## 2017-11-15 NOTE — PLAN OF CARE
Problem: Patient Care Overview  Goal: Plan of Care Review  Outcome: Ongoing (interventions implemented as appropriate)  Recommendations     Recommendation/Intervention: 1.Add Cardiac restriction to current diet. 2. Consider adding boost plus 1 can daily. 3. Continue multivitamin. 4. Discussed importance of adequate intake and increased protein for wound healing. 5. Will continue to encourage PO intake.   Goals: Intake of 85 - 100 % of meals by next RD visit  Nutrition Goal Status: new  Communication of RD Recs:  (care plan and sticky note)

## 2017-11-15 NOTE — PROGRESS NOTES
[facility] Hospital Medicine  Progress Note    Patient Name: Jeffrey Rosales  MRN: [id number]  Patient Class: IP- Inpatient   Admission Date: 11/12/2017  Length of Stay: 3 days  Attending Physician: Joe Jacob MD  Primary Care Provider: Provider Not[facility]        Subjective:     Principal Problem:Pneumonia due to infectious organism    HPI:  Patient is poor historian due to somnolence.  History obtained from patient's son, ED staff, and past medical record in Care Everywhere.  Mr. Rosales is a 65 yo male  with a PMHx of HIV/AIDS, HCV, HTN, HLD, DM II, DDD with chronic back pain, PUD, and h/o anal cancer, who presented to the ED with c/o generalized weakness, fatigue, and malaise that has progressively worsened over the past 2 weeks.  Associated subjective fevers, lethargy, and ~15-20 pound weight loss (unintentional) in past 3 months .  Denies any chest pain, palpitations, SOB/STREET, cough, orthopnea, PND, edema, weight gain/loss, decreased appetite, ABD pain or distention, N/V/D, melena/rectal bleeding, dysuria, hematuria, lightheadedness/dizziness, HA, visual disturbance, focal deficit, syncope, diaphoresis, or chills.  Patient's son also reports new abrasion to mid back that he noticed a few days ago with clear drainage.  Initial work-up in ED noted temp 100.6, , RR 23, O2 sat 92% on RA.  ABG resulted pH 7.4, CO2 31, O2 52, HCO3 19.7.  UA unremarkable.  CT head with no acute process.  CXR with bilateral infiltrates consistent with PCP pneumonia.  Hospital Medicine was consulted for admission.  Currently, patient appears comfortable, in NAD.  He remains lethargic, but easily aroused.    Hospital Course:  Pt admitted with Acute hypoxic respiratory failure and PCP Pneumonia and started on IVF and IV Bactrim and IV steroids.  CD4 count is now 71, Viral load is 6.4 million. LDH elevated. There is documented noncompliance with HAART per Dr. Pete in care everywhere. Dr Beaver at [facility] ID stopped  his antiretrovirals during the last visit in August 2017. Pt feels a little better, fatigue and malaise improved.  Dr. López feels pt is more confused than baseline- concerning for FELICITY virus and Neuro syphillis. Recommends LP.   He also recommended Azithromycin ppx   Offered nursing home placement to assist with med compliance- pt declined. Discussed grave prognosis without compliance with medications- pt verbalized understanding and declined Hospice. He reported he will be compliant with HAART and antibiotics. Dr. López suspects HIV resistence to HAART- will refer pt back to his ID. With pt's permission I discussed plan of care and pt status with pt's daughter Sariah who verbalized understanding.         Review of Systems   Constitutional: Positive for activity change, fatigue and unexpected weight change. Negative for appetite change, chills, diaphoresis and fever.   HENT: Negative for congestion, nosebleeds, sore throat and trouble swallowing.    Eyes: Negative for pain, discharge and visual disturbance.   Respiratory: Positive for shortness of breath. Negative for apnea, cough, chest tightness, wheezing and stridor.    Cardiovascular: Negative for chest pain, palpitations and leg swelling.   Gastrointestinal: Negative for abdominal distention, abdominal pain, blood in stool, constipation, diarrhea, nausea and vomiting.   Endocrine: Negative for cold intolerance and heat intolerance.   Genitourinary: Negative for difficulty urinating, dysuria, flank pain, frequency and urgency.   Musculoskeletal: Negative for arthralgias, back pain, joint swelling, myalgias, neck pain and neck stiffness.   Skin: Negative for rash and wound.   Allergic/Immunologic: Negative for food allergies and immunocompromised state.   Neurological: Positive for weakness (generalized ). Negative for dizziness, seizures, syncope, facial asymmetry, light-headedness and headaches.   Hematological: Negative for adenopathy.    Psychiatric/Behavioral: Positive for confusion. Negative for agitation and behavioral problems. The patient is not nervous/anxious.      Objective:     Vital Signs (Most Recent):  Temp: 97.6 °F (36.4 °C) (11/15/17 1619)  Pulse: (!) 127 (11/15/17 1619)  Resp: 20 (11/15/17 1619)  BP: 131/62 (11/15/17 1619)  SpO2: 97 % (11/15/17 1619) Vital Signs (24h Range):  Temp:  [96 °F (35.6 °C)-98.5 °F (36.9 °C)] 97.6 °F (36.4 °C)  Pulse:  [] 127  Resp:  [18-20] 20  SpO2:  [93 %-97 %] 97 %  BP: (111-132)/(55-79) 131/62     Weight: 97.5 kg (214 lb 15.2 oz)  Body mass index is 28.36 kg/m².    Intake/Output Summary (Last 24 hours) at 11/15/17 1649  Last data filed at 11/15/17 1333   Gross per 24 hour   Intake             2625 ml   Output              275 ml   Net             2350 ml      Physical Exam   Constitutional: He is oriented to person, place, and time. He appears well-developed and well-nourished. No distress.   HENT:   Head: Normocephalic and atraumatic.   Nose: Nose normal.   Mouth/Throat: Oropharynx is clear and moist.   Eyes: Conjunctivae and EOM are normal. No scleral icterus.   Neck: Normal range of motion. Neck supple.   Cardiovascular: Normal rate, regular rhythm, normal heart sounds and intact distal pulses.  Exam reveals no gallop and no friction rub.    No murmur heard.  Pulmonary/Chest: Effort normal. No stridor. No respiratory distress. He has no wheezes. He has no rales. He exhibits no tenderness.   Diminished bilaterally    Abdominal: Soft. Bowel sounds are normal. He exhibits no distension. There is no tenderness. There is no rebound and no guarding.   Musculoskeletal: Normal range of motion. He exhibits no edema, tenderness or deformity.   Neurological: He is alert and oriented to person, place, and time. He has normal reflexes. No cranial nerve deficit. He exhibits normal muscle tone. Coordination normal.   Disoriented to place and situation at times    Skin: Skin is warm and dry. No rash noted. He  is not diaphoretic. No erythema. No pallor.   Psychiatric: He has a normal mood and affect. His behavior is normal. Thought content normal.   Nursing note and vitals reviewed.      Significant Labs:   BMP:   Recent Labs  Lab 11/15/17  0500   *      K 4.8   *   CO2 19*   BUN 15   CREATININE 0.7   CALCIUM 9.5   MG 1.9     CBC:   Recent Labs  Lab 11/14/17  0521 11/15/17  0500   WBC 1.47* 3.30*   HGB 11.7* 12.0*   HCT 33.8* 34.6*   PLT 86* 99*     CMP:   Recent Labs  Lab 11/14/17  0521 11/15/17  0500   * 136   K 4.1 4.8    111*   CO2 17* 19*   * 131*   BUN 13 15   CREATININE 0.7 0.7   CALCIUM 9.2 9.5   PROT 6.0 6.2   ALBUMIN 1.7* 1.8*   BILITOT 0.5 0.5   ALKPHOS 106 107   AST 57* 63*   ALT 33 40   ANIONGAP 7* 6*   EGFRNONAA >60 >60       Significant Imaging:   Imaging Results          CT Head Without Contrast (Final result)  Result time 11/12/17 18:19:51    Final result by Leonor Marques III, MD (11/12/17 18:19:51)                 Impression:       1.  No acute intracranial disease identified.    2.  Mild age related atrophy and minimal chronic microvascular ischemic change.        Electronically signed by: LEONOR MARQUES MD  Date:     11/12/17  Time:    18:19              Narrative:    Head CT without contrast    Clinical history: R41.82 Altered mental status, unspecified; generalized weakness    TECHNIQUE: Routine noncontrast axial head CT. All CT scans at this facility use dose modulation, iterative reconstruction, and/or weight based dosing when appropriate to reduce radiation dose to as low as reasonably achievable.    Comparison: none    FINDINGS: There is mild diffuse age-related atrophy.  Atherosclerotic calcifications of the intracranial arteries are noted. There is minimal periventricular white matter hypodensity, most consistent with chronic microvascular ischemic change.  There is no CT evidence of acute infarct, intracranial hemorrhage, mass-effect,  obstructive  hydrocephalus or other acute disease. The visualized paranasal sinuses and mastoid air cells are clear. No calvarial fracture is identified.                             X-Ray Chest AP Portable (Final result)  Result time 11/12/17 16:46:20    Final result by Leonor Marques III, MD (11/12/17 16:46:20)                 Impression:     See above.        Electronically signed by: LEONOR MARQUES MD  Date:     11/12/17  Time:    16:46              Narrative:    XR CHEST AP PORTABLE    Clinical history: sepsis.      Findings: Cardiomediastinal silhouette is within normal limits for AP technique. There is mild elevation the right diaphragm was mild adjacent right infrahilar vascular crowding. There are symmetric band-shaped opacities in the bilateral medial upper lobes, possibly infiltrates or vascular congestion.  Opacity in the retrocardiac left lung base could be related to underpenetration or infiltrate.  The remainder the lungs appear clear of active disease.                            Assessment/Plan:      * PCP Pneumonia withy acute Hypoxemic Resp Failure     cont IV Bactrim   , Duonebs.  - Blood culture NGTD  Hypoxia resolved        AIDS (acquired immunodeficiency syndrome), CD4 <=200    - CD4 count 71, viral load of 6.5 million on 8/10/2017.  Patient has been off HAART since 4/2017 due to noncompliance.  Follows with Dr. Pete with [facility] infectious disease.  Start Azithromycin ppx   Will perform LP in am for Neuro syphilis and FELICITY virus           Encephalopathy acute    See above           Generalized weakness    Sec to ch illness and PCP pneumonia  Order OT/ PT, banana bag          Essential hypertension    - BP controlled currently.  - Continue home amlodipine.            VTE Risk Mitigation         Ordered     enoxaparin injection 40 mg  Daily     Route:  Subcutaneous        11/12/17 2054     Medium Risk of VTE  Once      11/12/17 2054     Place sequential compression device  Until discontinued      11/12/17 2054               Lindsey Navarro NP  Department of Hospital Medicine   [facility]

## 2017-11-15 NOTE — CONSULTS
[facility] -   Adult Nutrition  Consult Note    SUMMARY     Recommendations    Recommendation/Intervention: 1.Add Cardiac restriction to current diet. 2. Consider adding boost plus 1 can daily. 3. Continue multivitamin. 4. Discussed importance of adequate intake and increased protein for wound healing. 5. Will continue to encourage PO intake.   Goals: Intake of 85 - 100 % of meals by next RD visit  Nutrition Goal Status: new  Communication of RD Recs:  (care plan and sticky note)      Reason for Assessment    Reason for Assessment: nurse/nurse practitioner consult (sacral wound)   Diagnosis:  1. Pneumonia due to infectious organism, unspecified laterality, unspecified part of lung    2. Weakness    3. Altered mental status    4. Hypoxia    5. Type 2 diabetes mellitus    6. Pneumonia due to infectious organism    7. Acute on chronic respiratory failure with hypoxia    8. Pneumonia of both lungs due to infectious organism, unspecified part of lung    9. AIDS (acquired immunodeficiency syndrome), CD4 >200 and <500      Past Medical History:   Diagnosis Date    AIDS     Chronic back pain     Chronic hepatitis C     DDD (degenerative disc disease), lumbar     Diabetes mellitus     Hepatitis B antibody positive     History of anal cancer     HIV (human immunodeficiency virus infection)     Hypertension     Neuropathy     Obesity     Syphilis     Vitreous detachment of right eye      General Information Comments: Patient reports that diet is well tolerated, No N/V/D. Patient reports good appetite (PO intake ~ 75 %). Patient agreed to try boost plus.     Nutrition Discharge Planning: Cardiac Diabetic diet     Nutrition Prescription Ordered    Current Diet Order: Diabetic 1800      Evaluation of Received Nutrients/Fluid Intake          Energy Calories Required: not meeting needs      Protein Required: not meeting needs         Intake/Output Summary (Last 24 hours) at 11/15/17 1443  Last data  "filed at 11/15/17 1333   Gross per 24 hour   Intake             2625 ml   Output              275 ml   Net             2350 ml       % Intake of Estimated Energy Needs: Other: 75 %  % Meal Intake: 75%     Nutrition Risk Screen     Nutrition Risk Screen: no indicators present    Nutrition/Diet History       Typical Food/Fluid Intake: good intake at home  Food Preferences: none reported including Protestant or cultural       Labs/Tests/Procedures/Meds       Pertinent Labs Reviewed: reviewed, pertinent      BMP  Lab Results   Component Value Date     11/15/2017    K 4.8 11/15/2017     (H) 11/15/2017    CO2 19 (L) 11/15/2017    BUN 15 11/15/2017    CREATININE 0.7 11/15/2017    CALCIUM 9.5 11/15/2017    ANIONGAP 6 (L) 11/15/2017    ESTGFRAFRICA >60 11/15/2017    EGFRNONAA >60 11/15/2017     Lab Results   Component Value Date    CALCIUM 9.5 11/15/2017    PHOS 2.2 (L) 11/15/2017     Lab Results   Component Value Date    ALBUMIN 1.8 (L) 11/15/2017       Recent Labs  Lab 11/15/17  1042   POCTGLUCOSE 169*     Lab Results   Component Value Date    HGBA1C 4.9 11/12/2017       Pertinent Medications Reviewed: reviewed       Physical Findings    Overall Physical Appearance: overweight     Oral/Mouth Cavity:  (teeth missing)  Skin:  (Elmer 15, Wound back, pressure ulcer sacral spine stage III)    Anthropometrics    Temp: 97 °F (36.1 °C)     Height: 6' 1" (185.4 cm)  Weight Method: Bed Scale  Weight: 97.5 kg (214 lb 15.2 oz)  Ideal Body Weight (IBW), Male: 184 lb     % Ideal Body Weight, Male (lb): 116.82 lb     BMI (Calculated): 28.4  BMI Grade: 25 - 29.9 - overweight          Estimated/Assessed Needs    Weight Used For Calorie Calculations: 97.5 kg (214 lb 15.2 oz)   Height (cm): 185.4 cm     Energy Need Method: [place]-St Jeor (x1.2 = 2163)     RMR ([place]-St. Jeor Equation): 1803.88     Weight Used For Protein Calculations: 97.5 kg (214 lb 15.2 oz)     1.2 gm Protein (gm): 117.24 and 1.4 gm Protein (gm): 136.79   "   Fluid Need Method: RDA Method (or per MD)         CHO Requirement: 270g     Assessment and Plan    Pressure ulcer of coccygeal region, stage 3    Nutrition problem  Increased nutrient needs (protein)     Related to (etiology):   Wound healing     Signs and Symptoms (as evidenced by):   Wound posterior back, pressure ulcer sacral spine stage III     Interventions/Recommendations (treatment strategy):  See above    Nutrition Diagnosis Status:   New              Monitor and Evaluation    Food and Nutrient Intake: energy intake, food and beverage intake  Food and Nutrient Adminstration: diet order  Knowledge/Beliefs/Attitudes: beliefs and attitudes  Anthropometric Measurements: weight  Biochemical Data, Medical Tests and Procedures: electrolyte and renal panel, glucose/endocrine profile  Nutrition-Focused Physical Findings: overall appearance, skin    Nutrition Follow-Up    RD Follow-up?: Yes (1xweekly)

## 2017-11-15 NOTE — PT/OT/SLP EVAL
Occupational Therapy  Evaluation    Jeffrey Rosales   MRN: [id number]   Admitting Diagnosis: Pneumonia due to infectious organism    OT Date of Treatment: 11/15/17   OT Start Time: 0910  OT Stop Time: 0935  OT Total Time (min): 25 min    Billable Minutes:  Evaluation 10 minutes  Self Care/Home Management 15 minutes    Diagnosis: Pneumonia due to infectious organism   Debility and generalized weakness    Past Medical History:   Diagnosis Date    AIDS     Chronic back pain     Chronic hepatitis C     DDD (degenerative disc disease), lumbar     Diabetes mellitus     Hepatitis B antibody positive     History of anal cancer     HIV (human immunodeficiency virus infection)     Hypertension     Neuropathy     Obesity     Syphilis     Vitreous detachment of right eye       Past Surgical History:   Procedure Laterality Date    anal biopsy      COLONOSCOPY W/ BIOPSIES      ESOPHAGOGASTRODUODENOSCOPY      EYE SURGERY      MEDIPORT INSERTION, SINGLE      MEDIPORT REMOVAL         Referring physician: dr. Jacob    Date referred to OT: 11-14-17      General Precautions: Standard, fall  Orthopedic Precautions:    Braces:            Patient History:  Living Environment  Lives With: alone  Living Arrangements: mobile home  Home Accessibility: stairs to enter home  Home Layout: Able to live on 1st floor  Number of Stairs to Enter Home: 3  Transportation Available: family or friend will provide  Living Environment Comment: pt lives alone  in mobile home with 3 stairs to enter . pt/ family reported (i) plof  Equipment Currently Used at Home: cane, quad, walker, rolling (chair place in tub)    Prior level of function:   Bed Mobility/Transfers: independent  Grooming: independent  Bathing: independent  Upper Body Dressing: independent  Lower Body Dressing: independent  Toileting: independent  Home Management Skills: independent  Driving License: Yes  Occupation: On disability     Dominant hand:  right    Subjective:  Communicated with nurse garcia and [facility] chart review prior to session.    Chief Complaint: debility and generalized weakness  Patient/Family stated goals:     Pain/Comfort  Pain Rating 1: 0/10    Objective:  Patient found with: telemetry    Cognitive Exam:  Oriented to: Person, Place, Time and Situation  Follows Commands/attention: Follows two-step commands  Communication: clear/fluent  Memory:  Poor immediate recall  Safety awareness/insight to disability: impaired  Coping skills/emotional control: Appropriate to situation    Visual/perceptual:  Intact    Physical Exam:  Postural examination/scapula alignment: Rounded shoulder and Head forward  Skin integrity: Visible skin intact  Edema: Mild b ankles and feet per family report    Sensation:   Intact    Upper Extremity Range of Motion:  Right Upper Extremity: WFL  Left Upper Extremity: WFL    Upper Extremity Strength:  Right Upper Extremity: mmt: 3+/5 grossly  Left Upper Extremity: mmt: 3+/5 grossly   Strength: mmt: 3+/5 grossly    Fine motor coordination:   Intact    Gross motor coordination: WFL    Functional Mobility:  Bed Mobility:  Rolling/Turning to Left: Minimum assistance  Rolling/Turning Right: Minimum assistance  Scooting/Bridging: Minimum Assistance  Supine to Sit: Minimum Assistance  Sit to Supine: Minimum Assistance    Transfers:  Sit <> Stand Assistance: Moderate Assistance  Sit <> Stand Assistive Device: Rolling Walker  Bed <> Chair Technique: Stand Pivot  Bed <> Chair Transfer Assistance: Moderate Assistance (x2)  Bed <> Chair Assistive Device: Rolling Walker    Functional Ambulation: na    Activities of Daily Living:     Feeding adaptive equipment: na  UE Dressing Level of Assistance: Moderate assistance  UE adaptive equipment: na  LE Dressing Level of Assistance: Maximum assistance  LE adaptive equipment: na                    Bathing adaptive equipment: na    Balance:   Static Sit: FAIR+: Able to take MINIMAL  "challenges from all directions  Dynamic Sit: FAIR: Cannot move trunk without losing balance  Static Stand: POOR: Needs MODERATE assist to maintain  Dynamic stand: POOR: N/A    Therapeutic Activities and Exercises:      AM-PAC 6 CLICK ADL  How much help from another person does this patient currently need?  1 = Unable, Total/Dependent Assistance  2 = A lot, Maximum/Moderate Assistance  3 = A little, Minimum/Contact Guard/Supervision  4 = None, Modified [place]/Independent    Putting on and taking off regular lower body clothing? : 2  Bathing (including washing, rinsing, drying)?: 2  Toileting, which includes using toilet, bedpan, or urinal? : 2  Putting on and taking off regular upper body clothing?: 2  Taking care of personal grooming such as brushing teeth?: 3  Eating meals?: 4  Total Score: 15    AM-PAC Raw Score CMS "G-Code Modifier Level of Impairment Assistance   6 % Total / Unable   7 - 9 CM 80 - 100% Maximal Assist   10-14 CL 60 - 80% Moderate Assist   15 - 19 CK 40 - 60% Moderate Assist   20 - 22 CJ 20 - 40% Minimal Assist   23 CI 1-20% SBA / CGA   24 CH 0% Independent/ Mod I       Patient left up in chair with all lines intact, call button in reach, nurse jose notified and family present    Assessment:  Jeffrey Rosales is a 66 y.o. male with a medical diagnosis of Pneumonia due to infectious organism and presents with debility and generalized weakness. Pt will benefit from skilled O.T..    Rehab identified problem list/impairments: Rehab identified problem list/impairments: weakness, impaired functional mobilty, decreased safety awareness, impaired endurance, gait instability, impaired balance, impaired self care skills    Rehab potential is good.    Activity tolerance: Good    Discharge recommendations: Discharge Facility/Level Of Care Needs: nursing facility, skilled     Barriers to discharge: Barriers to Discharge: Decreased caregiver support, Inaccessible home environment    Equipment " recommendations:       GOALS:    Occupational Therapy Goals        Problem: Occupational Therapy Goal    Goal Priority Disciplines Outcome Interventions   Occupational Therapy Goal     OT, PT/OT     Description:  ot goals to be met by 11-22-17  1.  SBA with ue dressing  2. Min a  with le dressing  3. Pt armando;l tolerate 1 set x 15 reps b ue rom exercise with min resistance.  4. sba with bsc t/f's                      PLAN:  Patient to be seen 3 x/week to address the above listed problems via self-care/home management, therapeutic activities, therapeutic exercises  Plan of Care expires:    Plan of Care reviewed with: patient         Suzie Tobiaszier, OT  11/15/2017

## 2017-11-15 NOTE — ASSESSMENT & PLAN NOTE
- CD4 count 71, viral load of 6.5 million on 8/10/2017.  Patient has been off HAART since 4/2017 due to noncompliance.  Follows with Dr. Pete with [facility].  Start Azithromycin ppx   Will perform LP in am for Neuro syphilis and FELICITY virus

## 2017-11-15 NOTE — PROGRESS NOTES
Per  JULIO C Cornejo met with pt to get HH choice. Pt did not have HH preference and agreed to first available HH agency. Pt signed preference form to reflect this.   Also explained pt's insurance will not cover a nebulizer due to not having a qualifying diagnosis. Pt declines wanting a nebulizer at this time.   Updated given to JULIO C Cornejo.

## 2017-11-15 NOTE — ASSESSMENT & PLAN NOTE
Nutrition problem  Increased nutrient needs (protein)     Related to (etiology):   Wound healing     Signs and Symptoms (as evidenced by):   Wound posterior back, pressure ulcer sacral spine stage III     Interventions/Recommendations (treatment strategy):  See above    Nutrition Diagnosis Status:   New

## 2017-11-15 NOTE — SUBJECTIVE & OBJECTIVE
Review of Systems   Constitutional: Positive for activity change, fatigue and unexpected weight change. Negative for appetite change, chills, diaphoresis and fever.   HENT: Negative for congestion, nosebleeds, sore throat and trouble swallowing.    Eyes: Negative for pain, discharge and visual disturbance.   Respiratory: Positive for shortness of breath. Negative for apnea, cough, chest tightness, wheezing and stridor.    Cardiovascular: Negative for chest pain, palpitations and leg swelling.   Gastrointestinal: Negative for abdominal distention, abdominal pain, blood in stool, constipation, diarrhea, nausea and vomiting.   Endocrine: Negative for cold intolerance and heat intolerance.   Genitourinary: Negative for difficulty urinating, dysuria, flank pain, frequency and urgency.   Musculoskeletal: Negative for arthralgias, back pain, joint swelling, myalgias, neck pain and neck stiffness.   Skin: Negative for rash and wound.   Allergic/Immunologic: Negative for food allergies and immunocompromised state.   Neurological: Positive for weakness (generalized ). Negative for dizziness, seizures, syncope, facial asymmetry, light-headedness and headaches.   Hematological: Negative for adenopathy.   Psychiatric/Behavioral: Positive for confusion. Negative for agitation and behavioral problems. The patient is not nervous/anxious.      Objective:     Vital Signs (Most Recent):  Temp: 97.6 °F (36.4 °C) (11/15/17 1619)  Pulse: (!) 127 (11/15/17 1619)  Resp: 20 (11/15/17 1619)  BP: 131/62 (11/15/17 1619)  SpO2: 97 % (11/15/17 1619) Vital Signs (24h Range):  Temp:  [96 °F (35.6 °C)-98.5 °F (36.9 °C)] 97.6 °F (36.4 °C)  Pulse:  [] 127  Resp:  [18-20] 20  SpO2:  [93 %-97 %] 97 %  BP: (111-132)/(55-79) 131/62     Weight: 97.5 kg (214 lb 15.2 oz)  Body mass index is 28.36 kg/m².    Intake/Output Summary (Last 24 hours) at 11/15/17 1649  Last data filed at 11/15/17 1333   Gross per 24 hour   Intake             2625 ml    Output              275 ml   Net             2350 ml      Physical Exam   Constitutional: He is oriented to person, place, and time. He appears well-developed and well-nourished. No distress.   HENT:   Head: Normocephalic and atraumatic.   Nose: Nose normal.   Mouth/Throat: Oropharynx is clear and moist.   Eyes: Conjunctivae and EOM are normal. No scleral icterus.   Neck: Normal range of motion. Neck supple.   Cardiovascular: Normal rate, regular rhythm, normal heart sounds and intact distal pulses.  Exam reveals no gallop and no friction rub.    No murmur heard.  Pulmonary/Chest: Effort normal. No stridor. No respiratory distress. He has no wheezes. He has no rales. He exhibits no tenderness.   Diminished bilaterally    Abdominal: Soft. Bowel sounds are normal. He exhibits no distension. There is no tenderness. There is no rebound and no guarding.   Musculoskeletal: Normal range of motion. He exhibits no edema, tenderness or deformity.   Neurological: He is alert and oriented to person, place, and time. He has normal reflexes. No cranial nerve deficit. He exhibits normal muscle tone. Coordination normal.   Disoriented to place and situation at times    Skin: Skin is warm and dry. No rash noted. He is not diaphoretic. No erythema. No pallor.   Psychiatric: He has a normal mood and affect. His behavior is normal. Thought content normal.   Nursing note and vitals reviewed.      Significant Labs:   BMP:   Recent Labs  Lab 11/15/17  0500   *      K 4.8   *   CO2 19*   BUN 15   CREATININE 0.7   CALCIUM 9.5   MG 1.9     CBC:   Recent Labs  Lab 11/14/17  0521 11/15/17  0500   WBC 1.47* 3.30*   HGB 11.7* 12.0*   HCT 33.8* 34.6*   PLT 86* 99*     CMP:   Recent Labs  Lab 11/14/17  0521 11/15/17  0500   * 136   K 4.1 4.8    111*   CO2 17* 19*   * 131*   BUN 13 15   CREATININE 0.7 0.7   CALCIUM 9.2 9.5   PROT 6.0 6.2   ALBUMIN 1.7* 1.8*   BILITOT 0.5 0.5   ALKPHOS 106 107   AST 57* 63*    ALT 33 40   ANIONGAP 7* 6*   EGFRNONAA >60 >60       Significant Imaging:   Imaging Results          CT Head Without Contrast (Final result)  Result time 11/12/17 18:19:51    Final result by Leonor Marques III, MD (11/12/17 18:19:51)                 Impression:       1.  No acute intracranial disease identified.    2.  Mild age related atrophy and minimal chronic microvascular ischemic change.        Electronically signed by: LEONOR MARQUES MD  Date:     11/12/17  Time:    18:19              Narrative:    Head CT without contrast    Clinical history: R41.82 Altered mental status, unspecified; generalized weakness    TECHNIQUE: Routine noncontrast axial head CT. All CT scans at this facility use dose modulation, iterative reconstruction, and/or weight based dosing when appropriate to reduce radiation dose to as low as reasonably achievable.    Comparison: none    FINDINGS: There is mild diffuse age-related atrophy.  Atherosclerotic calcifications of the intracranial arteries are noted. There is minimal periventricular white matter hypodensity, most consistent with chronic microvascular ischemic change.  There is no CT evidence of acute infarct, intracranial hemorrhage, mass-effect,  obstructive hydrocephalus or other acute disease. The visualized paranasal sinuses and mastoid air cells are clear. No calvarial fracture is identified.                             X-Ray Chest AP Portable (Final result)  Result time 11/12/17 16:46:20    Final result by Leonor Marques III, MD (11/12/17 16:46:20)                 Impression:     See above.        Electronically signed by: LEONOR MARQUES MD  Date:     11/12/17  Time:    16:46              Narrative:    XR CHEST AP PORTABLE    Clinical history: sepsis.      Findings: Cardiomediastinal silhouette is within normal limits for AP technique. There is mild elevation the right diaphragm was mild adjacent right infrahilar vascular crowding. There are symmetric band-shaped opacities in  the bilateral medial upper lobes, possibly infiltrates or vascular congestion.  Opacity in the retrocardiac left lung base could be related to underpenetration or infiltrate.  The remainder the lungs appear clear of active disease.

## 2017-11-15 NOTE — PLAN OF CARE
Problem: Patient Care Overview  Goal: Plan of Care Review  Outcome: Ongoing (interventions implemented as appropriate)  Pt alert and oriented. POC reviewed, IV ABT administered per orders. Pt remained free of falls during shift, Dressing to coccyx and mid back CDI. Family at bedside,Bed alarm set , call light in reach, room free of clutter, side rails  up x2, pt on telemetry monitor SR/ST, will continue to monitor.

## 2017-11-15 NOTE — PLAN OF CARE
Discharge revoked per Dr. López.  VSS.   Lumbar puncture in AM.  Pt remained afebrile throughout this shift.   IV abx administered per order.   Pt remained free of falls this shift.   Plan of care reviewed. Patient verbalizes understanding.   Pt moving/turing independently.   Patient SB/SR on monitor.   Bed low, side rails up x 2, wheels locked, call light in reach.   Patient instructed to call for assistance.   Will continue to monitor.

## 2017-11-15 NOTE — PROGRESS NOTES
Home Oxygen Evaluation    Date Performed: 11/15/2017    1) Patient's Home O2 Sat on room air, while at rest: 96%        If O2 sats on room air at rest are 88% or below, patient qualifies. No additional testing needed. Document N/A in steps 2 and 3. If 89% or above, complete steps 2.      2) Patient's O2 Sat on room air while exercisin%   (patient sitting up in the chair. PT got him in the chair this morning. Spoke to the nurse, Sophie. Tried to get patient up w/a walker, but very weak and unable to get up. Did some exercises in the chair with patient, but spo2 did not get to 88% or below. Left a note on the board to call respiratory when he gets back to the bed.)        If O2 sats on room air while exercising remain 89% or above patient does not qualify, no further testing needed Document N/A in step 3. If O2 sats on room air while exercising are 88% or below, continue to step 3.      3) Patient's O2 Sat while exercising on O2: na at na LPM         (Must show improvement from #2 for patients to qualify)    If O2 sats improve on oxygen, patient qualifies for portable oxygen. If not, the patient does not qualify.

## 2017-11-15 NOTE — CONSULTS
[facility]  Infectious Disease  Progress Note    Patient Name: Jeffrey Rosales  MRN: [id number]  Admission Date: 11/12/2017  Length of Stay: 3 days  Attending Physician: Joe Jacob MD  Primary Care Provider: Provider [facility]    Isolation Status: No active isolations  Assessment/Plan:      No new Assessment & Plan notes have been filed under this hospital service since the last note was generated.  Service: Infectious Diseases      Anticipated Disposition:     Thank you for your consult. I will follow-up with patient. Please contact us if you have any additional questions.    Jeffery López MD  Infectious Disease  [facility]    Subjective:     Principal Problem:Pneumonia due to infectious organism    HPI: Patient is poor historian due to somnolence.  History obtained from patient's daughter , ED staff, and past medical record in Care Everywhere.  66 year old  male  with history of of HIV/AIDS-cd4 count on 08/2017-247,cd4% 28 ,HIV viral load -6.4 million , HCV, HTN, HLD, DM II, DDD with chronic back pain, PUD, and h/o anal cancer, who presented to the ED with c/o generalized weakness, fatigue, and malaise that has progressively worsened over the past 2 weeks.  Associated subjective fevers, lethargy, and ~15-20 pound weight loss (unintentional) in past 3 months .  .  Patient's son also reports new abrasion to mid back that he noticed a few days ago with clear drainage.  Initial work-up in ED noted temp 100.6, , RR 23, O2 sat 92% on RA.  CT head with no acute process.  CTA of the chest -11/01-  2.  1.6 cm right lower lobe pulmonary nodule.    3.  Scattered pulmonary emphysema changes.    From the last ID follow up note on 08/27-[facility] reported that he has not picked up antiretrovirals in months. Last refill date for Tivicay February 2017 and Truvada April 2017.  He follows up with [facility] oncology for anal cancer.  At that visit ,his HAART therapy was stopped.    No new subjective &  objective note has been filed under this hospital service since the last note was generated.

## 2017-11-15 NOTE — PT/OT/SLP EVAL
Physical Therapy  Evaluation    Jeffrey Rosales   MRN: [id number]   Admitting Diagnosis: Pneumonia due to infectious organism    PT Received On: 11/15/17  PT Start Time: 0840     PT Stop Time: 0905    PT Total Time (min): 25 min       Billable Minutes:  Evaluation 15 and Therapeutic Activity 10    Diagnosis: Pneumonia due to infectious organism  P.T. TX DX: IMPAIRED FUNCTIONAL MOBILITY    Past Medical History:   Diagnosis Date    AIDS     Chronic back pain     Chronic hepatitis C     DDD (degenerative disc disease), lumbar     Diabetes mellitus     Hepatitis B antibody positive     History of anal cancer     HIV (human immunodeficiency virus infection)     Hypertension     Neuropathy     Obesity     Syphilis     Vitreous detachment of right eye       Past Surgical History:   Procedure Laterality Date    anal biopsy      COLONOSCOPY W/ BIOPSIES      ESOPHAGOGASTRODUODENOSCOPY      EYE SURGERY      MEDIPORT INSERTION, SINGLE      MEDIPORT REMOVAL       Referring physician: DR. AMADOR  Date referred to PT: 11-14-17    General Precautions: Standard, fall (COGNITIVE IMPAIRMENT, +AIDS, HEP B, HEP C, SYPHILLUS)  Orthopedic Precautions: N/A   Braces: N/A       Patient History:  Lives With: alone  Living Arrangements: mobile home  Home Accessibility: stairs to enter home  Home Layout: Able to live on 1st floor  Number of Stairs to Enter Home: 3  Stair Railings at Home: outside, present on right side  Transportation Available: car, family or friend will provide  Living Environment Comment: PT LIVES ALONE, X-WIFE REPORTS TO ASSIST PT AS NEEDED, REPORTS FRANCISCA BEFORE 2 WEEKS AGO, HAS BEEN BED BOUND FOR LAST 2 WEEKS PER X-WIFE  Equipment Currently Used at Home: cane, quad  DME owned (not currently used): rolling walker    Previous Level of Function:  Ambulation Skills: needs device (AMB WITH QUAD CANE LIMITED COMMUNITY DISTANCES)  Transfer Skills: needs assist  ADL Skills: needs device (SITS ON WOODEN STOOL IN  TUB)    Subjective:  Communicated with NURSE HOGUE prior to session.  Pain/Comfort  Pain Rating 1: 0/10     Objective:   Patient found with: telemetry     Cognitive Exam:  Oriented to: Person, PT IS POOR HISTORIAN, CONFUSED/DISORIENTED, X-WIFE PRESENT TO OFFER PLOF/HISTORY BUT ALSO INCONSISTENT    Follows Commands/attention: Easily distracted and Follows one-step commands  Communication: clear/fluent  Safety awareness/insight to disability: impaired    Physical Exam:  Postural examination/scapula alignment: Rounded shoulder    Skin integrity: Visible skin intact  Edema: None noted     Sensation:   Impaired  light/touch BLE    Upper Extremity Range of Motion:  REFER TO O.T. EVAL    Lower Extremity Range of Motion:  Right Lower Extremity: WFL  Left Lower Extremity: WFL    Lower Extremity Strength:  Right Lower Extremity: GROSSLY 3+/5  Left Lower Extremity: GROSSLY 3+/5     Functional Mobility:  Bed Mobility:  Rolling/Turning to Left: Minimum assistance  Rolling/Turning Right: Minimum assistance  Scooting/Bridging: Minimum Assistance  Supine to Sit: Moderate Assistance  Sit to Supine: Moderate Assistance    Transfers:  Sit <> Stand Assistance: Moderate Assistance (X 2 STAFF)  Sit <> Stand Assistive Device: Rolling Walker (PT EDUCATED IN RW USE AND SAFETY DURING TF'S AND GAIT)  Bed <> Chair Technique: Stand Pivot  Bed <> Chair Assistance: Moderate Assistance (X 2 STAFF)  Bed <> Chair Assistive Device: Rolling Walker    Gait:   Gait Distance: PT TOOK SEVERAL SMALL STEPS WITH RW TO TF FROM BED TO CHAIR  Assistance 1: Moderate assistance (X 2 STAFF)  Gait Assistive Device: Rolling walker  Gait Pattern: swing-to gait  Gait Deviation(s): decreased phillip, decreased step length    Balance:   Static Sit: FAIR  Dynamic Sit: POOR+  Static Stand: POOR  Dynamic stand: POOR    Therapeutic Activities and Exercises:  PT EDUCATED IN BLE THEREX TO PERFORM WHILE SEATED IN CHAIR, PT REQUIRED MODA X 2 FOR BED TO CHAIR TF, CUES TO STAY  ON TASK WITH POSTERIOR LEAN    AM-PAC 6 CLICK MOBILITY  How much help from another person does this patient currently need?   1 = Unable, Total/Dependent Assistance  2 = A lot, Maximum/Moderate Assistance  3 = A little, Minimum/Contact Guard/Supervision  4 = None, Modified [place]/Independent    Turning over in bed (including adjusting bedclothes, sheets and blankets)?: 2  Sitting down on and standing up from a chair with arms (e.g., wheelchair, bedside commode, etc.): 2  Moving from lying on back to sitting on the side of the bed?: 2  Moving to and from a bed to a chair (including a wheelchair)?: 2  Need to walk in hospital room?: 2  Climbing 3-5 steps with a railing?: 1 (NT)  Total Score: 11     AM-PAC Raw Score CMS G-Code Modifier Level of Impairment Assistance   6 % Total / Unable   7 - 9 CM 80 - 100% Maximal Assist   10 - 14 CL 60 - 80% Moderate Assist   15 - 19 CK 40 - 60% Moderate Assist   20 - 22 CJ 20 - 40% Minimal Assist   23 CI 1-20% SBA / CGA   24 CH 0% Independent/ Mod I     Patient left up in chair with all lines intact, call button in reach, NURSE notified and X-WIFE present.    Assessment:   Jeffrey Rosales is a 66 y.o. male with a medical diagnosis of Pneumonia due to infectious organism and presents with IMPAIRED FUNCTIONAL MOBILITY. PT WILL BENEFIT FROM CONT. SKILLED P.T. TO ADDRESS IMPAIRMENTS    Rehab identified problem list/impairments: Rehab identified problem list/impairments: weakness, impaired endurance, impaired functional mobilty, gait instability, impaired balance, decreased coordination, decreased safety awareness, impaired cognition    Rehab potential is fair.    Activity tolerance: Fair    Discharge recommendations: Discharge Facility/Level Of Care Needs: nursing facility, skilled     Barriers to discharge: Barriers to Discharge: Decreased caregiver support, Inaccessible home environment    Equipment recommendations: Equipment Needed After Discharge: bedside commode, bath  bench     GOALS:    Physical Therapy Goals        Problem: Physical Therapy Goal    Goal Priority Disciplines Outcome Goal Variances Interventions   Physical Therapy Goal     PT/OT, PT      Description:  LTG'S TO BE MET IN 7 DAYS (11-22-17)  1. PT WILL REQUIRE CGA FOR BED MOBILITY  2. PT WILL REQUIRE CGA FOR TF'S  3. PT WILL ' WITH RW AND MARCELA  4. PT WILL TOLERATE BLE THEREX X 20 REPS AROM  5. PT WILL DEMO F- DYNAMIC BALANCE DURING GAIT                    PLAN:    Patient to be seen 5 x/week to address the above listed problems via gait training, therapeutic activities, therapeutic exercises  Plan of Care expires: 11/22/17  Plan of Care reviewed with: patient, friend    PT ENCOURAGED TO CALL FOR ASSISTANCE WITH ALL NEEDS DUE TO FALL RISK STATUS, PT AGREEABLE.  PT ENCOURAGED TO INCREASE TIME OOB IN CHAIR, ALL MEALS IN CHAIR OOB, PT AGREEABLE    Ashanti Joaquin, PT  11/15/2017

## 2017-11-16 PROBLEM — D69.59 THROMBOCYTOPENIA ASSOCIATED WITH AIDS: Status: ACTIVE | Noted: 2017-01-01

## 2017-11-16 PROBLEM — B20 THROMBOCYTOPENIA ASSOCIATED WITH AIDS: Status: ACTIVE | Noted: 2017-01-01

## 2017-11-16 NOTE — PT/OT/SLP PROGRESS
Physical Therapy  Treatment    Jeffrey Rosales   MRN: [id number]   Admitting Diagnosis: Pneumonia due to infectious organism    PT Received On: 11/16/17  PT Start Time: 0900     PT Stop Time: 0930    PT Total Time (min): 30 min       Billable Minutes:  Gait [id number] and Therapeutic Exercise 15    Treatment Type: Treatment  PT/PTA: PT       General Precautions: Standard, fall  Orthopedic Precautions: N/A   Braces: N/A    Subjective:  Communicated with NURSE HOGUE prior to session.  Pain/Comfort  Pain Rating 1: 0/10    Objective:   Patient found with: telemetry    Functional Mobility:  Bed Mobility:   Rolling/Turning to Left: Minimum assistance  Rolling/Turning Right: Minimum assistance  Scooting/Bridging: Minimum Assistance  Supine to Sit: Moderate Assistance    Transfers:  Sit <> Stand Assistance: Moderate Assistance  Sit <> Stand Assistive Device: Rolling Walker  Bed <> Chair Technique: Stand Pivot  Bed <> Chair Assistance: Moderate Assistance  Bed <> Chair Assistive Device: Rolling Walker    Gait:   Gait Distance: PT AMB 5' FWD AND 5' BWD WITH RW AND MODA, CHAIR IN TOW FOR SAFETY, PT REQUIRING CONSTANT CUES FOR UPRIGHT POSTURE DUE TO FLEXED FWD, QUICK TO FATIGUE  Assistance 1: Moderate assistance  Gait Assistive Device: Rolling walker  Gait Pattern: swing-through gait  Gait Deviation(s): decreased phillip, decreased step length, decreased toe-to-floor clearance, decreased weight-shifting ability    Balance:   Static Sit: POOR+  Dynamic Sit: POOR+  Static Stand: POOR  Dynamic stand:  POOR    Therapeutic Activities and Exercises:  PT SLOW MOVING WITH ALL FUNCTIONAL MOBILITY, CUES TO STAY ON TASK.  PT WASH FACE AND COMB HAIR WITH SET UP.  PT EDUCATED IN AND PERFORMED BLE THEREX X 20 REPS AROM WITH REST    AM-PAC 6 CLICK MOBILITY  How much help from another person does this patient currently need?   1 = Unable, Total/Dependent Assistance  2 = A lot, Maximum/Moderate Assistance  3 = A little, Minimum/Contact  Guard/Supervision  4 = None, Modified [place]/Independent    Turning over in bed (including adjusting bedclothes, sheets and blankets)?: 2  Sitting down on and standing up from a chair with arms (e.g., wheelchair, bedside commode, etc.): 2  Moving from lying on back to sitting on the side of the bed?: 2  Moving to and from a bed to a chair (including a wheelchair)?: 2  Need to walk in hospital room?: 2  Climbing 3-5 steps with a railing?: 1  Total Score: 11    AM-PAC Raw Score CMS G-Code Modifier Level of Impairment Assistance   6 % Total / Unable   7 - 9 CM 80 - 100% Maximal Assist   10 - 14 CL 60 - 80% Moderate Assist   15 - 19 CK 40 - 60% Moderate Assist   20 - 22 CJ 20 - 40% Minimal Assist   23 CI 1-20% SBA / CGA   24 CH 0% Independent/ Mod I     Patient left up in chair with all lines intact, call button in reach, NURSE notified and FRIEND present.    Assessment:  Jeffrey Rosales is a 66 y.o. male with a medical diagnosis of Pneumonia due to infectious organism   PT WILL BENEFIT FROM CONT. SKILLED P.T. TO ADDRESS IMPAIRMENTS    Rehab identified problem list/impairments: Rehab identified problem list/impairments: weakness, impaired endurance, impaired functional mobilty, gait instability, impaired balance, decreased coordination, decreased safety awareness    Rehab potential is fair.    Activity tolerance: Fair    Discharge recommendations: Discharge Facility/Level Of Care Needs: nursing facility, skilled     Barriers to discharge: Barriers to Discharge: Inaccessible home environment, Decreased caregiver support    Equipment recommendations: Equipment Needed After Discharge: bedside commode, bath bench     GOALS:    Physical Therapy Goals        Problem: Physical Therapy Goal    Goal Priority Disciplines Outcome Goal Variances Interventions   Physical Therapy Goal     PT/OT, PT      Description:  LTG'S TO BE MET IN 7 DAYS (11-22-17)  1. PT WILL REQUIRE CGA FOR BED MOBILITY  2. PT WILL REQUIRE CGA FOR  TF'S  3. PT WILL ' WITH RW AND MARCELA  4. PT WILL TOLERATE BLE THEREX X 20 REPS AROM  5. PT WILL DEMO F- DYNAMIC BALANCE DURING GAIT                     PLAN:    Patient to be seen 5 x/week  to address the above listed problems via gait training, therapeutic activities, therapeutic exercises  Plan of Care expires: 11/22/17  Plan of Care reviewed with: patient, friend    PT ENCOURAGED TO CALL FOR ASSISTANCE WITH ALL NEEDS DUE TO FALL RISK STATUS, PT AGREEABLE.  PT ENCOURAGED TO INCREASE TIME OOB IN CHAIR, ALL MEALS IN CHAIR OOB, PT AGREEABLE    Ashanti Joaquin, PT  11/16/2017

## 2017-11-16 NOTE — PT/OT/SLP PROGRESS
Occupational Therapy      Jeffrey Rosales  MRN: [id number]    Patient not seen today secondary to pt's refused, family present.  Will follow-up at next visit. Attempted: 14:00    Sushila Meeks OT  11/16/2017

## 2017-11-16 NOTE — PLAN OF CARE
Problem: Patient Care Overview  Goal: Plan of Care Review  Outcome: Ongoing (interventions implemented as appropriate)  Pt remained afebrile throughout this shift.   IV Abx administered per order.   Pt remained free of falls this shift.  Pt had lumbar puncture and CT of head this shift.    Plan of care reviewed. Patient verbalizes understanding.   Pt moving/turing independently. Frequent weight shifting encouraged.   Patient ST on monitor.   Bed low, side rails up x 2, wheels locked, call light in reach.   Patient instructed to call for assistance.   Will continue to monitor.

## 2017-11-16 NOTE — SUBJECTIVE & OBJECTIVE
Review of Systems   Constitutional: Positive for activity change, fatigue and unexpected weight change. Negative for appetite change, chills, diaphoresis and fever.   HENT: Negative for congestion, nosebleeds, sore throat and trouble swallowing.    Eyes: Negative for pain, discharge and visual disturbance.   Respiratory: Positive for shortness of breath. Negative for apnea, cough, chest tightness, wheezing and stridor.    Cardiovascular: Negative for chest pain, palpitations and leg swelling.   Gastrointestinal: Negative for abdominal distention, abdominal pain, blood in stool, constipation, diarrhea, nausea and vomiting.   Endocrine: Negative for cold intolerance and heat intolerance.   Genitourinary: Negative for difficulty urinating, dysuria, flank pain, frequency and urgency.   Musculoskeletal: Negative for arthralgias, back pain, joint swelling, myalgias, neck pain and neck stiffness.   Skin: Negative for rash and wound.   Allergic/Immunologic: Negative for food allergies and immunocompromised state.   Neurological: Positive for weakness (generalized ). Negative for dizziness, seizures, syncope, facial asymmetry, light-headedness and headaches.   Hematological: Negative for adenopathy.   Psychiatric/Behavioral: Positive for confusion. Negative for agitation and behavioral problems. The patient is not nervous/anxious.      Objective:     Vital Signs (Most Recent):  Temp: 96.6 °F (35.9 °C) (11/16/17 1244)  Pulse: 102 (11/16/17 1517)  Resp: 20 (11/16/17 1244)  BP: 139/76 (11/16/17 1244)  SpO2: 96 % (11/16/17 1244) Vital Signs (24h Range):  Temp:  [96.6 °F (35.9 °C)-98.2 °F (36.8 °C)] 96.6 °F (35.9 °C)  Pulse:  [] 102  Resp:  [18-20] 20  SpO2:  [91 %-96 %] 96 %  BP: (130-144)/(62-76) 139/76     Weight: 97.5 kg (214 lb 15.2 oz)  Body mass index is 28.36 kg/m².    Intake/Output Summary (Last 24 hours) at 11/16/17 1701  Last data filed at 11/16/17 1200   Gross per 24 hour   Intake             1750 ml    Output              500 ml   Net             1250 ml      Physical Exam   Constitutional: He is oriented to person, place, and time. He appears well-developed and well-nourished. No distress.   HENT:   Head: Normocephalic and atraumatic.   Nose: Nose normal.   Mouth/Throat: Oropharynx is clear and moist.   Eyes: Conjunctivae and EOM are normal. No scleral icterus.   Neck: Normal range of motion. Neck supple.   Cardiovascular: Normal rate, regular rhythm, normal heart sounds and intact distal pulses.  Exam reveals no gallop and no friction rub.    No murmur heard.  Pulmonary/Chest: Effort normal. No stridor. No respiratory distress. He has no wheezes. He has no rales. He exhibits no tenderness.   Diminished bilaterally    Abdominal: Soft. Bowel sounds are normal. He exhibits no distension. There is no tenderness. There is no rebound and no guarding.   Musculoskeletal: Normal range of motion. He exhibits no edema, tenderness or deformity.   Neurological: He is alert and oriented to person, place, and time. He has normal reflexes. No cranial nerve deficit. He exhibits normal muscle tone. Coordination normal.   Disoriented to place and situation at times    Skin: Skin is warm and dry. No rash noted. He is not diaphoretic. No erythema. No pallor.   Psychiatric: He has a normal mood and affect. His behavior is normal. Thought content normal.   Nursing note and vitals reviewed.      Significant Labs:   BMP:     Recent Labs  Lab 11/16/17  0558   *      K 4.8      CO2 21*   BUN 16   CREATININE 0.8   CALCIUM 9.9   MG 2.1     CBC:     Recent Labs  Lab 11/15/17  0500 11/16/17  0558   WBC 3.30* 3.89*   HGB 12.0* 12.1*   HCT 34.6* 34.7*   PLT 99* 103*     CMP:     Recent Labs  Lab 11/15/17  0500 11/16/17  0558    137   K 4.8 4.8   * 108   CO2 19* 21*   * 121*   BUN 15 16   CREATININE 0.7 0.8   CALCIUM 9.5 9.9   PROT 6.2 6.7   ALBUMIN 1.8* 2.1*   BILITOT 0.5 0.5   ALKPHOS 107 126   AST 63*  68*   ALT 40 53*   ANIONGAP 6* 8   EGFRNONAA >60 >60       Significant Imaging:   Imaging Results          CT Head Without Contrast (Final result)  Result time 11/16/17 16:42:12    Final result by Wilberto Dillard MD (11/16/17 16:42:12)                 Impression:         Chronic microvascular ischemic changes.     All CT scans at this facility use dose modulation, iterative reconstruction and/or weight based dosing when appropriate to reduce radiation dose to as low as reasonably achievable.        Electronically signed by: WILBERTO DILLARD MD  Date:     11/16/17  Time:    16:42              Narrative:    CT OF THE HEAD WITHOUT CONTRAST:     Technique: 5 mm axial images were obtained from the skullbase to the vertex, without administration of contrast.    Comparison: None.    History: r/o bleed    Findings:    No intracranial hemorrhage, extra-axial fluid collection, midline shift, or mass effect.  No evidence of an acute cortical infarct or of an infarct in a major vascular territory.    There is mild prominence of the ventricles and sulci compatible with diffuse cerebral volume loss.  Patchy hypoattenuation in the periventricular white matter regions is nonspecific, but most commonly seen with chronic microvascular ischemic changes. Vascular calcifications noted.    The calvarium is unremarkable. Visualized portions of the paranasal sinuses are clear. Visualized mastoid air cells are clear. Visualized orbits are unremarkable.                             FL Lumbar Puncture (xpd) (Final result)  Result time 11/16/17 11:03:14    Final result by Wilberto Dillard MD (11/16/17 11:03:14)                 Impression:     Technically successful fluoroscopic guided a lumbar puncture via a L2/3 translaminar approach. Approximately 11cc of clear cerebrospinal fluid was collected, placed in simple bottles, and sent to lab for analysis.       Electronically signed by: WILBERTO DILLARD MD  Date:     11/16/17  Time:    11:03               Narrative:    Procedure: Fluoroscopic guided lumbar puncture  Operators: Dimitrios.    History:  Confusion with HIV    Technique: Informed consent was obtained from the patient.  Following explanation of the risks, benefits and alternatives to the procedure including but not limited to bleeding, infection, nerve root/vascular injury, drug reaction, CSF leak/spinal headache, and herniation written consent was signed by the patient and placed in the patient's chart.     The patient was then placed on the fluoroscopic table in the prone position. On hand RN and RT assisted with patient positioning. The patient's back was then prepped and draped in sterile fashion. The L2/3 level was localized with fluoroscopic guidance. Using a 2 cc of a 1% lidocaine the overlying skin was anesthetized. Subsequently using a 20-gauge spinal needle and fluoroscopic guidance access was obtained into the thecal sac via a left L3/L4 translaminar approach. Following removal of the stylet clear cerebrospinal fluid was identified spontaneously. Opening pressure was 8 cm of water. Approximately 11 cc was collected, placed in sample bottles, and sent to the lab for analysis. The stylet was then reinserted and the spinal needle was removed in full.      The patient tolerated the procedure well. No immediate postprocedure complications. Adhesive bandage placed over the puncture site.    Fluoroscopic time was 0.6   and a single fluoroscopic images were obtained.     Order placed in chart for strict bed rest next 3 hours.                             CT Head Without Contrast (Final result)  Result time 11/12/17 18:19:51    Final result by Leonor Marques III, MD (11/12/17 18:19:51)                 Impression:       1.  No acute intracranial disease identified.    2.  Mild age related atrophy and minimal chronic microvascular ischemic change.        Electronically signed by: LEONOR MARQUES MD  Date:     11/12/17  Time:    18:19              Narrative:     Head CT without contrast    Clinical history: R41.82 Altered mental status, unspecified; generalized weakness    TECHNIQUE: Routine noncontrast axial head CT. All CT scans at this facility use dose modulation, iterative reconstruction, and/or weight based dosing when appropriate to reduce radiation dose to as low as reasonably achievable.    Comparison: none    FINDINGS: There is mild diffuse age-related atrophy.  Atherosclerotic calcifications of the intracranial arteries are noted. There is minimal periventricular white matter hypodensity, most consistent with chronic microvascular ischemic change.  There is no CT evidence of acute infarct, intracranial hemorrhage, mass-effect,  obstructive hydrocephalus or other acute disease. The visualized paranasal sinuses and mastoid air cells are clear. No calvarial fracture is identified.                             X-Ray Chest AP Portable (Final result)  Result time 11/12/17 16:46:20    Final result by Leonor Marques III, MD (11/12/17 16:46:20)                 Impression:     See above.        Electronically signed by: LEONOR MARQUES MD  Date:     11/12/17  Time:    16:46              Narrative:    XR CHEST AP PORTABLE    Clinical history: sepsis.      Findings: Cardiomediastinal silhouette is within normal limits for AP technique. There is mild elevation the right diaphragm was mild adjacent right infrahilar vascular crowding. There are symmetric band-shaped opacities in the bilateral medial upper lobes, possibly infiltrates or vascular congestion.  Opacity in the retrocardiac left lung base could be related to underpenetration or infiltrate.  The remainder the lungs appear clear of active disease.

## 2017-11-16 NOTE — PLAN OF CARE
Problem: Patient Care Overview  Goal: Plan of Care Review  Outcome: Ongoing (interventions implemented as appropriate)  Pt is very weak and is unable to ambulate from bed to bathroom. Assist x 2 given, instructed to call for assist, BSC in place. O2 @ 3 liters nasal cannula, no acute distress. No complaint of pain or discomfort. Pt assisted with repositioning prn. ST to NSR noted on monitor. Blood sugars slightly elevated, insulin coverage given per SS. Fly present at bedside, bed low, call bell within reach.

## 2017-11-16 NOTE — ASSESSMENT & PLAN NOTE
Will continue therapy for presumed PCP with Bactrim and steroid.  Will do induced sputum  For PCP.

## 2017-11-16 NOTE — PLAN OF CARE
Home Health company denials:   [facility]- No MD in area  [facility]- Can not meet needs  [facility]- Out of service area  [facility]-Out of service  Area  [facility]- out of service area/network    Patient accepted by [facility]

## 2017-11-16 NOTE — PHYSICIAN QUERY
"PT Name: Jeffrey Rosales  MR #: [id number]    Physician Query Form - Nutrition Clarification     CDS: Angela Aguirre RN, CCDS         Contact information :[phone]  [email or web address]       This form is a permanent document in the medical record.     Query Date: November 16, 2017    By submitting this query, we are merely seeking further clarification of documentation.. Please utilize your independent clinical judgment when addressing the question(s) below.    The Medical record contains the following:   Indicators  Supporting Clinical Findings Location in Medical Record    % of Estimated Energy Intake over a time frame from p.o., TF, or TPN     x Weight Status over a time frame " ~15-20 pound weight loss (unintentional) in past 3 months" RD Consult 11/15/17    Subcutaneous Fat and/or Muscle Loss      Fluid Accumulation or Edema      Reduced  Strength     x Wt / BMI / Usual Body Weight "BMI (Calculated): 28.4  BMI Grade: 25 - 29.9 - overweight" RD consult 11/15/17    Delayed Wound Healing / Failure to Thrive     x Acute or Chronic Illness   "Pneumonia due to infectious organism     - Likely PCP pneumonia".   Severe sepsis  AIDS (acquired immunodeficiency syndrome"    "Pressure Ulcer 11/12/17 2021 posterior sacral spine Stage III, present on admission"  Wound 11/12/17 2021 Blister(s);Other (Comment) posterior back, pre-existing:yes"   H&P 11/12/17              Wound care note 11/14/17    Medication     x Treatment "Recommendation/Intervention: 1.Add Cardiac restriction to current diet. 2. Consider adding boost plus 1 can daily. 3. Continue multivitamin. 4. Discussed importance of adequate intake and increased protein for wound healing. 5. Will continue to encourage PO intake. " RD Consult 11/15/17   x Other       AND / ASPEN Clinical Characteristics (October 2011)  A minimum of two characteristics is recommended for diagnosing either moderate or severe malnutrition   Mild Malnutrition Moderate " Malnutrition Severe Malnutrition   Energy Intake from p.o., TF or TPN. < 75% intake of estimated energy needs for less than 7 days < 75% intake of estimated energy needs for greater than 7 days < 50% intake of estimated energy needs for > 5 days   Weight Loss 1-2% in 1 month  5% in 3 months  7.5% in 6 months  10% in 1 year 1-2 % in 1 week  5% in 1 month  7.5% in 3 months  10% in 6 months  20% in 1 year > 2% in 1 week  > 5% in 1 month  > 7.5% in 3 months  > 10% in 6 months  > 20% in 1 year   Physical Findings     None *Mild subcutaneous fat and/or muscle loss  *Mild fluid accumulation  *Stage II decubitus  *Surgical wound or non-healing wound *Mod/severe subcutaneous fat and/or muscle loss  *Mod/severe fluid accumulation  *Stage III or IV decubitus  *Non-healing surgical wound     Provider, please specify diagnosis or diagnoses associated with above clinical findings.    [ ] Mild Protein-Calorie Malnutrition    [ x] Moderate Protein-Calorie Malnutrition    [ ] Other Nutritional Diagnosis (please specify): ____________________________________    [ ] Other: ________________________________    [ ] Clinically Undetermined    Please document in your progress notes daily for the duration of treatment until resolved and include in your discharge summary.

## 2017-11-16 NOTE — OP NOTE
Sterile technique was performed in the lower back, lidocaine was used as a local anesthetic.  OP 8 cm of h2o, 11 ccs clear csf to lab for eval.  Pt tolerated the procedure well without immediate complications.  Please see radiologist report for details. F/u with PCP and/or ordering physician.

## 2017-11-16 NOTE — SUBJECTIVE & OBJECTIVE
Interval History:   66 year old man with AIDS-    CD4 COUNT - 71,CD%-3.8.  VIRAL load is pending .  He has episodes of confusion.  He has past history of syphilis.    He was admitted with history of falls.    Review of Systems   Constitutional: Positive for activity change, fatigue and unexpected weight change. Negative for appetite change, chills, diaphoresis and fever.   HENT: Negative for congestion, nosebleeds, sore throat and trouble swallowing.    Eyes: Negative for pain, discharge and visual disturbance.   Respiratory: Positive for shortness of breath. Negative for apnea, cough, chest tightness, wheezing and stridor.    Cardiovascular: Negative for chest pain, palpitations and leg swelling.   Gastrointestinal: Negative for abdominal distention, abdominal pain, blood in stool, constipation, diarrhea, nausea and vomiting.   Endocrine: Negative for cold intolerance and heat intolerance.   Genitourinary: Negative for difficulty urinating, dysuria, flank pain, frequency and urgency.   Musculoskeletal: Negative for arthralgias, back pain, joint swelling, myalgias, neck pain and neck stiffness.   Skin: Negative for rash and wound.   Allergic/Immunologic: Negative for food allergies and immunocompromised state.   Neurological: Positive for weakness (generalized ). Negative for dizziness, seizures, syncope, facial asymmetry, light-headedness and headaches.        He has intermittent episodes of confusion and has history of falls.   Hematological: Negative for adenopathy.   Psychiatric/Behavioral: Positive for confusion. Negative for agitation and behavioral problems. The patient is not nervous/anxious.      Objective:     Vital Signs (Most Recent):  Temp: 98.1 °F (36.7 °C) (11/15/17 2351)  Pulse: 106 (11/15/17 2351)  Resp: 18 (11/15/17 2351)  BP: (!) 144/67 (11/15/17 2351)  SpO2: (!) 92 % (11/15/17 2351) Vital Signs (24h Range):  Temp:  [96 °F (35.6 °C)-98.4 °F (36.9 °C)] 98.1 °F (36.7 °C)  Pulse:  [] 106  Resp:   [18-20] 18  SpO2:  [92 %-97 %] 92 %  BP: (111-144)/(55-79) 144/67     Weight: 97.5 kg (214 lb 15.2 oz)  Body mass index is 28.36 kg/m².    Estimated Creatinine Clearance: 125.9 mL/min (based on SCr of 0.7 mg/dL).    Physical Exam   Constitutional: He appears well-developed and well-nourished. He appears lethargic. He is sleeping. He is easily aroused. No distress.   HENT:   Head: Normocephalic and atraumatic.   Eyes: Conjunctivae and EOM are normal. Pupils are equal, round, and reactive to light.   Neck: Normal range of motion. Neck supple. No JVD present.   Cardiovascular: Regular rhythm, S1 normal, S2 normal and intact distal pulses.   No extrasystoles are present. Tachycardia present.  Exam reveals no gallop.    No murmur heard.  Pulses:       Radial pulses are 2+ on the right side, and 2+ on the left side.        Dorsalis pedis pulses are 2+ on the right side, and 2+ on the left side.        Posterior tibial pulses are 2+ on the right side, and 2+ on the left side.   Pulmonary/Chest: Effort normal and breath sounds normal. No accessory muscle usage. No tachypnea. No respiratory distress. He has no decreased breath sounds. He has no wheezes. He has no rales.   Abdominal: Soft. Bowel sounds are normal. He exhibits no distension. There is no tenderness. There is no rebound and no guarding.   Musculoskeletal: Normal range of motion. He exhibits no edema, tenderness or deformity.   Neurological: He is easily aroused. He appears lethargic. No cranial nerve deficit or sensory deficit. GCS eye subscore is 3. GCS verbal subscore is 5. GCS motor subscore is 6.   Not oriented to place,has intermittent episodes of confusion.   Skin: Skin is warm and dry. Capillary refill takes less than 2 seconds. Abrasion (midback, no drainage appreciated) noted. No rash noted. He is not diaphoretic. No erythema.   Psychiatric: His speech is normal. Thought content normal. His mood appears not anxious. He is not actively hallucinating.  Cognition and memory are normal. He does not exhibit a depressed mood. He is attentive.   Nursing note and vitals reviewed.      Significant Labs:   Blood Culture:   Recent Labs  Lab 11/12/17  1540 11/12/17  1555   LABBLOO No Growth to date  No Growth to date  No Growth to date  No Growth to date No Growth to date  No Growth to date  No Growth to date  No Growth to date     CBC:   Recent Labs  Lab 11/14/17  0521 11/15/17  0500   WBC 1.47* 3.30*   HGB 11.7* 12.0*   HCT 33.8* 34.6*   PLT 86* 99*     HIV 1/2 Antibodies: No results for input(s): [id number] in the last 48 hours.  All pertinent labs within the past 24 hours have been reviewed.    Significant Imaging: I have reviewed all pertinent imaging results/findings within the past 24 hours.

## 2017-11-16 NOTE — PROGRESS NOTES
[facility] Hospital Medicine  Progress Note    Patient Name: Jeffrey Rosales  MRN: [id number]  Patient Class: IP- Inpatient   Admission Date: 11/12/2017  Length of Stay: 4 days  Attending Physician: Joe Jacob MD  Primary Care Provider: Provider Not[facility]ystem        Subjective:     Principal Problem:Pneumonia due to infectious organism    HPI:  Patient is poor historian due to somnolence.  History obtained from patient's son, ED staff, and past medical record in Care Everywhere.  Mr. Rosales is a 67 yo male  with a PMHx of HIV/AIDS, HCV, HTN, HLD, DM II, DDD with chronic back pain, PUD, and h/o anal cancer, who presented to the ED with c/o generalized weakness, fatigue, and malaise that has progressively worsened over the past 2 weeks.  Associated subjective fevers, lethargy, and ~15-20 pound weight loss (unintentional) in past 3 months .  Denies any chest pain, palpitations, SOB/STREET, cough, orthopnea, PND, edema, weight gain/loss, decreased appetite, ABD pain or distention, N/V/D, melena/rectal bleeding, dysuria, hematuria, lightheadedness/dizziness, HA, visual disturbance, focal deficit, syncope, diaphoresis, or chills.  Patient's son also reports new abrasion to mid back that he noticed a few days ago with clear drainage.  Initial work-up in ED noted temp 100.6, , RR 23, O2 sat 92% on RA.  ABG resulted pH 7.4, CO2 31, O2 52, HCO3 19.7.  UA unremarkable.  CT head with no acute process.  CXR with bilateral infiltrates consistent with PCP pneumonia.  Hospital Medicine was consulted for admission.  Currently, patient appears comfortable, in NAD.  He remains lethargic, but easily aroused.    Hospital Course:  Pt admitted with Acute hypoxic respiratory failure and PCP Pneumonia and started on IVF and IV Bactrim and IV steroids.  CD4 count is now 71, Viral load is 6.4 million. LDH elevated. There is documented noncompliance with HAART per Dr. Pete in care everywhere. Dr Beaver at [facility]"" ID stopped  his antiretrovirals during the last visit in August 2017. Pt feels a little better, fatigue and malaise improved. Still very weak.     Dr. López feels pt is more confused than baseline- concerning for FELICITY virus and Neuro syphillis. Pt reports memory problems and tremors x 2 weeks. LP cultures still pending.    Dr. López  also recommended Azithromycin ppx     Offered nursing home placement to assist with med compliance- pt initially declined. Discussed grave prognosis without compliance with medications- pt verbalized understanding and declined Hospice. He reported he will be compliant with HAART and antibiotics. Dr. López suspects HIV resistence to HAART- will refer pt back to his ID. With pt's permission I discussed plan of care and pt status with pt's daughter Sariah who verbalized understanding. Daughter and pt's spouse request SNF - pt agreed to SNF.         Review of Systems   Constitutional: Positive for activity change, fatigue and unexpected weight change. Negative for appetite change, chills, diaphoresis and fever.   HENT: Negative for congestion, nosebleeds, sore throat and trouble swallowing.    Eyes: Negative for pain, discharge and visual disturbance.   Respiratory: Positive for shortness of breath. Negative for apnea, cough, chest tightness, wheezing and stridor.    Cardiovascular: Negative for chest pain, palpitations and leg swelling.   Gastrointestinal: Negative for abdominal distention, abdominal pain, blood in stool, constipation, diarrhea, nausea and vomiting.   Endocrine: Negative for cold intolerance and heat intolerance.   Genitourinary: Negative for difficulty urinating, dysuria, flank pain, frequency and urgency.   Musculoskeletal: Negative for arthralgias, back pain, joint swelling, myalgias, neck pain and neck stiffness.   Skin: Negative for rash and wound.   Allergic/Immunologic: Negative for food allergies and immunocompromised state.   Neurological: Positive for weakness (generalized ).  Negative for dizziness, seizures, syncope, facial asymmetry, light-headedness and headaches.   Hematological: Negative for adenopathy.   Psychiatric/Behavioral: Positive for confusion. Negative for agitation and behavioral problems. The patient is not nervous/anxious.      Objective:     Vital Signs (Most Recent):  Temp: 96.6 °F (35.9 °C) (11/16/17 1244)  Pulse: 102 (11/16/17 1517)  Resp: 20 (11/16/17 1244)  BP: 139/76 (11/16/17 1244)  SpO2: 96 % (11/16/17 1244) Vital Signs (24h Range):  Temp:  [96.6 °F (35.9 °C)-98.2 °F (36.8 °C)] 96.6 °F (35.9 °C)  Pulse:  [] 102  Resp:  [18-20] 20  SpO2:  [91 %-96 %] 96 %  BP: (130-144)/(62-76) 139/76     Weight: 97.5 kg (214 lb 15.2 oz)  Body mass index is 28.36 kg/m².    Intake/Output Summary (Last 24 hours) at 11/16/17 1701  Last data filed at 11/16/17 1200   Gross per 24 hour   Intake             1750 ml   Output              500 ml   Net             1250 ml      Physical Exam   Constitutional: He is oriented to person, place, and time. He appears well-developed and well-nourished. No distress.   HENT:   Head: Normocephalic and atraumatic.   Nose: Nose normal.   Mouth/Throat: Oropharynx is clear and moist.   Eyes: Conjunctivae and EOM are normal. No scleral icterus.   Neck: Normal range of motion. Neck supple.   Cardiovascular: Normal rate, regular rhythm, normal heart sounds and intact distal pulses.  Exam reveals no gallop and no friction rub.    No murmur heard.  Pulmonary/Chest: Effort normal. No stridor. No respiratory distress. He has no wheezes. He has no rales. He exhibits no tenderness.   Diminished bilaterally    Abdominal: Soft. Bowel sounds are normal. He exhibits no distension. There is no tenderness. There is no rebound and no guarding.   Musculoskeletal: Normal range of motion. He exhibits no edema, tenderness or deformity.   Neurological: He is alert and oriented to person, place, and time. He has normal reflexes. No cranial nerve deficit. He exhibits  normal muscle tone. Coordination normal.   Disoriented to place and situation at times    Skin: Skin is warm and dry. No rash noted. He is not diaphoretic. No erythema. No pallor.   Psychiatric: He has a normal mood and affect. His behavior is normal. Thought content normal.   Nursing note and vitals reviewed.      Significant Labs:   BMP:     Recent Labs  Lab 11/16/17  0558   *      K 4.8      CO2 21*   BUN 16   CREATININE 0.8   CALCIUM 9.9   MG 2.1     CBC:     Recent Labs  Lab 11/15/17  0500 11/16/17  0558   WBC 3.30* 3.89*   HGB 12.0* 12.1*   HCT 34.6* 34.7*   PLT 99* 103*     CMP:     Recent Labs  Lab 11/15/17  0500 11/16/17  0558    137   K 4.8 4.8   * 108   CO2 19* 21*   * 121*   BUN 15 16   CREATININE 0.7 0.8   CALCIUM 9.5 9.9   PROT 6.2 6.7   ALBUMIN 1.8* 2.1*   BILITOT 0.5 0.5   ALKPHOS 107 126   AST 63* 68*   ALT 40 53*   ANIONGAP 6* 8   EGFRNONAA >60 >60       Significant Imaging:   Imaging Results          CT Head Without Contrast (Final result)  Result time 11/16/17 16:42:12    Final result by Wilberto Dillard MD (11/16/17 16:42:12)                 Impression:         Chronic microvascular ischemic changes.     All CT scans at this facility use dose modulation, iterative reconstruction and/or weight based dosing when appropriate to reduce radiation dose to as low as reasonably achievable.        Electronically signed by: WILBERTO DILLARD MD  Date:     11/16/17  Time:    16:42              Narrative:    CT OF THE HEAD WITHOUT CONTRAST:     Technique: 5 mm axial images were obtained from the skullbase to the vertex, without administration of contrast.    Comparison: None.    History: r/o bleed    Findings:    No intracranial hemorrhage, extra-axial fluid collection, midline shift, or mass effect.  No evidence of an acute cortical infarct or of an infarct in a major vascular territory.    There is mild prominence of the ventricles and sulci compatible with diffuse  cerebral volume loss.  Patchy hypoattenuation in the periventricular white matter regions is nonspecific, but most commonly seen with chronic microvascular ischemic changes. Vascular calcifications noted.    The calvarium is unremarkable. Visualized portions of the paranasal sinuses are clear. Visualized mastoid air cells are clear. Visualized orbits are unremarkable.                             FL Lumbar Puncture (xpd) (Final result)  Result time 11/16/17 11:03:14    Final result by Wilberto Dillard MD (11/16/17 11:03:14)                 Impression:     Technically successful fluoroscopic guided a lumbar puncture via a L2/3 translaminar approach. Approximately 11cc of clear cerebrospinal fluid was collected, placed in simple bottles, and sent to lab for analysis.       Electronically signed by: WILBERTO DILLARD MD  Date:     11/16/17  Time:    11:03              Narrative:    Procedure: Fluoroscopic guided lumbar puncture  Operators: Dimitrios.    History:  Confusion with HIV    Technique: Informed consent was obtained from the patient.  Following explanation of the risks, benefits and alternatives to the procedure including but not limited to bleeding, infection, nerve root/vascular injury, drug reaction, CSF leak/spinal headache, and herniation written consent was signed by the patient and placed in the patient's chart.     The patient was then placed on the fluoroscopic table in the prone position. On hand RN and RT assisted with patient positioning. The patient's back was then prepped and draped in sterile fashion. The L2/3 level was localized with fluoroscopic guidance. Using a 2 cc of a 1% lidocaine the overlying skin was anesthetized. Subsequently using a 20-gauge spinal needle and fluoroscopic guidance access was obtained into the thecal sac via a left L3/L4 translaminar approach. Following removal of the stylet clear cerebrospinal fluid was identified spontaneously. Opening pressure was 8 cm of water.  Approximately 11 cc was collected, placed in sample bottles, and sent to the lab for analysis. The stylet was then reinserted and the spinal needle was removed in full.      The patient tolerated the procedure well. No immediate postprocedure complications. Adhesive bandage placed over the puncture site.    Fluoroscopic time was 0.6   and a single fluoroscopic images were obtained.     Order placed in chart for strict bed rest next 3 hours.                             CT Head Without Contrast (Final result)  Result time 11/12/17 18:19:51    Final result by Leonor Marques III, MD (11/12/17 18:19:51)                 Impression:       1.  No acute intracranial disease identified.    2.  Mild age related atrophy and minimal chronic microvascular ischemic change.        Electronically signed by: LEONOR MARQUES MD  Date:     11/12/17  Time:    18:19              Narrative:    Head CT without contrast    Clinical history: R41.82 Altered mental status, unspecified; generalized weakness    TECHNIQUE: Routine noncontrast axial head CT. All CT scans at this facility use dose modulation, iterative reconstruction, and/or weight based dosing when appropriate to reduce radiation dose to as low as reasonably achievable.    Comparison: none    FINDINGS: There is mild diffuse age-related atrophy.  Atherosclerotic calcifications of the intracranial arteries are noted. There is minimal periventricular white matter hypodensity, most consistent with chronic microvascular ischemic change.  There is no CT evidence of acute infarct, intracranial hemorrhage, mass-effect,  obstructive hydrocephalus or other acute disease. The visualized paranasal sinuses and mastoid air cells are clear. No calvarial fracture is identified.                             X-Ray Chest AP Portable (Final result)  Result time 11/12/17 16:46:20    Final result by Leonor Marques III, MD (11/12/17 16:46:20)                 Impression:     See above.         Electronically signed by: LEONOR MARQUES MD  Date:     11/12/17  Time:    16:46              Narrative:    XR CHEST AP PORTABLE    Clinical history: sepsis.      Findings: Cardiomediastinal silhouette is within normal limits for AP technique. There is mild elevation the right diaphragm was mild adjacent right infrahilar vascular crowding. There are symmetric band-shaped opacities in the bilateral medial upper lobes, possibly infiltrates or vascular congestion.  Opacity in the retrocardiac left lung base could be related to underpenetration or infiltrate.  The remainder the lungs appear clear of active disease.                            Assessment/Plan:      * PCP Pneumonia withy acute Hypoxemic Resp Failure     cont IV Bactrim   , Duonebs.  - Blood cultures show  NGTD  Hypoxia resolved        AIDS (acquired immunodeficiency syndrome), CD4 <=200    CD4 count 71, viral load of 6.5 million on 8/10/2017.  Patient has been off HAART since 4/2017 due to noncompliance.  Follows with Dr. Pete with [facility].  Care discussed with Cardiology   Start Azithromycin ppx   Pt requests to be re-started on HAART  Pt underwent LP for Neuro syphilis and FELICITY virus - cultures pending           Encephalopathy acute    See above           Thrombocytopenia associated with AIDS    Monitor           Pressure ulcer of coccygeal region, stage 3    Wound care           Generalized weakness    Sec to ch illness and PCP pneumonia  PT/OT  Family requests SNF          Essential hypertension    BP controlled currently.  Continue home amlodipine.            VTE Risk Mitigation         Ordered     Medium Risk of VTE  Once      11/12/17 2054     Place sequential compression device  Until discontinued      11/12/17 2054              Lindsey Navarro NP  Department of Hospital Medicine   [facility]

## 2017-11-16 NOTE — PLAN OF CARE
[facility] was unable to accept patient.  CM made several additional referrals for home health via [facility]

## 2017-11-16 NOTE — ASSESSMENT & PLAN NOTE
CD4 count 71, viral load of 6.5 million on 8/10/2017.  Patient has been off HAART since 4/2017 due to noncompliance.  Follows with Dr. Pete with [facility].  Care discussed with Cardiology   Start Azithromycin ppx   Pt requests to be re-started on HAART  Pt underwent LP for Neuro syphilis and FELICITY virus - cultures pending

## 2017-11-16 NOTE — ASSESSMENT & PLAN NOTE
With AIDS,will need to rule out neurosyphilis and PML .  Will do LP in am,send CSF for FELICITY virus and CSF VDRL.    AIDS Dementia can also explain the intermittent confusion and abnormal behavior.  With history of falls ?ataxia and confusion,Neurosyphillis should be ruled out .  His RPR is 1:4.    He needs to be compliant with medication. He needs family support and maybe placement .  He needs close monitoring

## 2017-11-16 NOTE — PLAN OF CARE
Problem: Patient Care Overview  Goal: Plan of Care Review  Outcome: Ongoing (interventions implemented as appropriate)  PT WITH IMPROVED PARTICIPATION, MODA FOR BED MOBILITY, TF'S, AND GAIT USING RW

## 2017-11-16 NOTE — ASSESSMENT & PLAN NOTE
Latest cd4 count -08/2017 -247 with viral load of 6.4 million .  CD 4 count is 71-CD% -3.8.  Will add zithromax 1200mg for MAC prophylaxis.  Continue bactrim for PCP.      Dr Beaver at [facility] stopped his antiretrovirals during the last visit in August 2017.He is very poorly compliant with therapy .

## 2017-11-17 NOTE — PLAN OF CARE
CM contacted [facility] [phone] and scheduled appointment with Dr Beaver for November 27, 2017 at 2:00pm.  Information placed on AVS

## 2017-11-17 NOTE — PLAN OF CARE
Problem: Patient Care Overview  Goal: Plan of Care Review  Outcome: Ongoing (interventions implemented as appropriate)  Pt has been more oriented over night than the previous night. ST noted on monitor most of the night. No respiratory distress. No falls or injury. Plan of care discussed with pt and fly. All medications and interventions completed as ordered. Spouse present at bedside, call bell placed within reach.

## 2017-11-17 NOTE — ASSESSMENT & PLAN NOTE
CD4 count 71, viral load of 6.5 million on 8/10/2017.  Patient has been off HAART since 4/2017 due to noncompliance.  Follows with Dr. Pete with [facility] infectious disease.  Care discussed with Infectious disease  Start Azithromycin ppx   Pt requests to be re-started on HAART  Pt underwent LP for Neuro syphilis and FELICITY virus - cultures pending - start IV PCN

## 2017-11-17 NOTE — ASSESSMENT & PLAN NOTE
With AIDS,will need to rule out neurosyphilis and PML .  Will follow CSF results  for FELICITY virus and CSF VDRL.      CSF wbc of 4 and protein of 45  Suggests lower likelihood of neurosyphillis.  Will follow CSF VDRL.

## 2017-11-17 NOTE — PT/OT/SLP PROGRESS
Physical Therapy      Jeffrey Rosales  MRN: [id number]    RESUME P.T. ORDER CAME ACROSS PER DR. TANNER TODAY, ATTEMPTED RESUME WITH PT BUT PT DECLINED TX. STATING HE HAS BEEN UP AND MOVING ALL DAY AND IS TIRED, WILL ASSESS PT NEXT VISIT    Ashanti Joaquin, PT   11/17/2017  1343

## 2017-11-17 NOTE — SUBJECTIVE & OBJECTIVE
Review of Systems   Constitutional: Positive for activity change, fatigue and unexpected weight change. Negative for appetite change, chills, diaphoresis and fever.   HENT: Negative for congestion, nosebleeds, sore throat and trouble swallowing.    Eyes: Negative for pain, discharge and visual disturbance.   Respiratory: Positive for shortness of breath. Negative for apnea, cough, chest tightness, wheezing and stridor.    Cardiovascular: Negative for chest pain, palpitations and leg swelling.   Gastrointestinal: Negative for abdominal distention, abdominal pain, blood in stool, constipation, diarrhea, nausea and vomiting.   Endocrine: Negative for cold intolerance and heat intolerance.   Genitourinary: Negative for difficulty urinating, dysuria, flank pain, frequency and urgency.   Musculoskeletal: Negative for arthralgias, back pain, joint swelling, myalgias, neck pain and neck stiffness.   Skin: Negative for rash and wound.   Allergic/Immunologic: Negative for food allergies and immunocompromised state.   Neurological: Positive for tremors and weakness (generalized ). Negative for dizziness, seizures, syncope, facial asymmetry, light-headedness and headaches.   Hematological: Negative for adenopathy.   Psychiatric/Behavioral: Positive for behavioral problems, confusion and hallucinations. Negative for agitation. The patient is not nervous/anxious.      Objective:     Vital Signs (Most Recent):  Temp: 96.3 °F (35.7 °C) (11/17/17 1158)  Pulse: 110 (11/17/17 1158)  Resp: 18 (11/17/17 1158)  BP: 135/69 (11/17/17 1158)  SpO2: 95 % (11/17/17 1158) Vital Signs (24h Range):  Temp:  [96.3 °F (35.7 °C)-97.5 °F (36.4 °C)] 96.3 °F (35.7 °C)  Pulse:  [] 110  Resp:  [16-20] 18  SpO2:  [95 %-97 %] 95 %  BP: (122-139)/(60-76) 135/69     Weight: 97.5 kg (214 lb 15.2 oz)  Body mass index is 28.36 kg/m².    Intake/Output Summary (Last 24 hours) at 11/17/17 1241  Last data filed at 11/17/17 0904   Gross per 24 hour   Intake              1510 ml   Output             1550 ml   Net              -40 ml      Physical Exam   Constitutional: He is oriented to person, place, and time. He appears well-developed and well-nourished. No distress.   HENT:   Head: Normocephalic and atraumatic.   Nose: Nose normal.   Mouth/Throat: Oropharynx is clear and moist.   Eyes: Conjunctivae and EOM are normal. No scleral icterus.   Neck: Normal range of motion. Neck supple.   Cardiovascular: Normal rate, regular rhythm, normal heart sounds and intact distal pulses.  Exam reveals no gallop and no friction rub.    No murmur heard.  Pulmonary/Chest: Effort normal. No stridor. No respiratory distress. He has no wheezes. He has no rales. He exhibits no tenderness.   Diminished bilaterally    Abdominal: Soft. Bowel sounds are normal. He exhibits no distension. There is no tenderness. There is no rebound and no guarding.   Musculoskeletal: Normal range of motion. He exhibits no edema, tenderness or deformity.   Neurological: He is alert and oriented to person, place, and time. He has normal reflexes. No cranial nerve deficit. He exhibits normal muscle tone. Coordination abnormal.   Disoriented to place and situation at times   +tremors- worsening    Skin: Skin is warm and dry. No rash noted. He is not diaphoretic. No erythema. No pallor.   Psychiatric: He has a normal mood and affect. He expresses impulsivity.   Inappropriate behavior    Nursing note and vitals reviewed.      Significant Labs:   BMP:     Recent Labs  Lab 11/17/17 0357   GLU 96   *   K 4.9      CO2 20*   BUN 15   CREATININE 0.8   CALCIUM 10.1   MG 2.1     CBC:     Recent Labs  Lab 11/16/17 0558 11/17/17 0357   WBC 3.89* 4.36   HGB 12.1* 12.4*   HCT 34.7* 35.8*   * 120*     CMP:     Recent Labs  Lab 11/16/17 0558 11/17/17 0357    134*   K 4.8 4.9    107   CO2 21* 20*   * 96   BUN 16 15   CREATININE 0.8 0.8   CALCIUM 9.9 10.1   PROT 6.7 7.0   ALBUMIN 2.1* 2.2*    BILITOT 0.5 0.5   ALKPHOS 126 133   AST 68* 77*   ALT 53* 66*   ANIONGAP 8 7*   EGFRNONAA >60 >60       Significant Imaging:   Imaging Results          CT Head Without Contrast (Final result)  Result time 11/16/17 16:42:12    Final result by Wilberto Dillard MD (11/16/17 16:42:12)                 Impression:         Chronic microvascular ischemic changes.     All CT scans at this facility use dose modulation, iterative reconstruction and/or weight based dosing when appropriate to reduce radiation dose to as low as reasonably achievable.        Electronically signed by: WILBERTO DILLARD MD  Date:     11/16/17  Time:    16:42              Narrative:    CT OF THE HEAD WITHOUT CONTRAST:     Technique: 5 mm axial images were obtained from the skullbase to the vertex, without administration of contrast.    Comparison: None.    History: r/o bleed    Findings:    No intracranial hemorrhage, extra-axial fluid collection, midline shift, or mass effect.  No evidence of an acute cortical infarct or of an infarct in a major vascular territory.    There is mild prominence of the ventricles and sulci compatible with diffuse cerebral volume loss.  Patchy hypoattenuation in the periventricular white matter regions is nonspecific, but most commonly seen with chronic microvascular ischemic changes. Vascular calcifications noted.    The calvarium is unremarkable. Visualized portions of the paranasal sinuses are clear. Visualized mastoid air cells are clear. Visualized orbits are unremarkable.                             FL Lumbar Puncture (xpd) (Final result)  Result time 11/16/17 11:03:14    Final result by Wilberto Dillard MD (11/16/17 11:03:14)                 Impression:     Technically successful fluoroscopic guided a lumbar puncture via a L2/3 translaminar approach. Approximately 11cc of clear cerebrospinal fluid was collected, placed in simple bottles, and sent to lab for analysis.       Electronically signed by: WILBERTO DILLARD  MD  Date:     11/16/17  Time:    11:03              Narrative:    Procedure: Fluoroscopic guided lumbar puncture  Operators: Dimitrios.    History:  Confusion with HIV    Technique: Informed consent was obtained from the patient.  Following explanation of the risks, benefits and alternatives to the procedure including but not limited to bleeding, infection, nerve root/vascular injury, drug reaction, CSF leak/spinal headache, and herniation written consent was signed by the patient and placed in the patient's chart.     The patient was then placed on the fluoroscopic table in the prone position. On hand RN and RT assisted with patient positioning. The patient's back was then prepped and draped in sterile fashion. The L2/3 level was localized with fluoroscopic guidance. Using a 2 cc of a 1% lidocaine the overlying skin was anesthetized. Subsequently using a 20-gauge spinal needle and fluoroscopic guidance access was obtained into the thecal sac via a left L3/L4 translaminar approach. Following removal of the stylet clear cerebrospinal fluid was identified spontaneously. Opening pressure was 8 cm of water. Approximately 11 cc was collected, placed in sample bottles, and sent to the lab for analysis. The stylet was then reinserted and the spinal needle was removed in full.      The patient tolerated the procedure well. No immediate postprocedure complications. Adhesive bandage placed over the puncture site.    Fluoroscopic time was 0.6   and a single fluoroscopic images were obtained.     Order placed in chart for strict bed rest next 3 hours.                             CT Head Without Contrast (Final result)  Result time 11/12/17 18:19:51    Final result by Leonor Marques III, MD (11/12/17 18:19:51)                 Impression:       1.  No acute intracranial disease identified.    2.  Mild age related atrophy and minimal chronic microvascular ischemic change.        Electronically signed by: LEONOR MARQUES MD  Date:      11/12/17  Time:    18:19              Narrative:    Head CT without contrast    Clinical history: R41.82 Altered mental status, unspecified; generalized weakness    TECHNIQUE: Routine noncontrast axial head CT. All CT scans at this facility use dose modulation, iterative reconstruction, and/or weight based dosing when appropriate to reduce radiation dose to as low as reasonably achievable.    Comparison: none    FINDINGS: There is mild diffuse age-related atrophy.  Atherosclerotic calcifications of the intracranial arteries are noted. There is minimal periventricular white matter hypodensity, most consistent with chronic microvascular ischemic change.  There is no CT evidence of acute infarct, intracranial hemorrhage, mass-effect,  obstructive hydrocephalus or other acute disease. The visualized paranasal sinuses and mastoid air cells are clear. No calvarial fracture is identified.                             X-Ray Chest AP Portable (Final result)  Result time 11/12/17 16:46:20    Final result by Leonor Marques III, MD (11/12/17 16:46:20)                 Impression:     See above.        Electronically signed by: LEONOR MARQUES MD  Date:     11/12/17  Time:    16:46              Narrative:    XR CHEST AP PORTABLE    Clinical history: sepsis.      Findings: Cardiomediastinal silhouette is within normal limits for AP technique. There is mild elevation the right diaphragm was mild adjacent right infrahilar vascular crowding. There are symmetric band-shaped opacities in the bilateral medial upper lobes, possibly infiltrates or vascular congestion.  Opacity in the retrocardiac left lung base could be related to underpenetration or infiltrate.  The remainder the lungs appear clear of active disease.

## 2017-11-17 NOTE — PROGRESS NOTES
In room. Pt incontinent of urine. Pt cleansed. Linen changed. [facility] alarm [facility]. [facility]

## 2017-11-17 NOTE — PROGRESS NOTES
[facility] Hospital Medicine  Progress Note    Patient Name: Jeffrey Rosales  MRN: [id number]  Patient Class: IP- Inpatient   Admission Date: 11/12/2017  Length of Stay: 5 days  Attending Physician: Tommy Myles MD  Primary Care Provider: Provider [employer]        Subjective:     Principal Problem:Pneumonia due to infectious organism    HPI:  Patient is poor historian due to somnolence.  History obtained from patient's son, ED staff, and past medical record in Care Everywhere.  Mr. Rosales is a 67 yo male  with a PMHx of HIV/AIDS, HCV, HTN, HLD, DM II, DDD with chronic back pain, PUD, and h/o anal cancer, who presented to the ED with c/o generalized weakness, fatigue, and malaise that has progressively worsened over the past 2 weeks.  Associated subjective fevers, lethargy, and ~15-20 pound weight loss (unintentional) in past 3 months .  Denies any chest pain, palpitations, SOB/STREET, cough, orthopnea, PND, edema, weight gain/loss, decreased appetite, ABD pain or distention, N/V/D, melena/rectal bleeding, dysuria, hematuria, lightheadedness/dizziness, HA, visual disturbance, focal deficit, syncope, diaphoresis, or chills.  Patient's son also reports new abrasion to mid back that he noticed a few days ago with clear drainage.  Initial work-up in ED noted temp 100.6, , RR 23, O2 sat 92% on RA.  ABG resulted pH 7.4, CO2 31, O2 52, HCO3 19.7.  UA unremarkable.  CT head with no acute process.  CXR with bilateral infiltrates consistent with PCP pneumonia.  Hospital Medicine was consulted for admission.  Currently, patient appears comfortable, in NAD.  He remains lethargic, but easily aroused.    Hospital Course:  Pt admitted with Acute hypoxic respiratory failure and PCP Pneumonia and started on IVF and IV Bactrim and IV steroids.  CD4 count is now 71, Viral load is 6.4 million. LDH elevated. There is documented noncompliance with HAART per Dr. Pete in care everywhere. Dr Beaver at [facility] stopped his  antiretrovirals during the last visit in August 2017.     Pt's confusion is worsening with hallucinations and strange behavior- but still often answering questions appropriately -Dr. lópez states behavior is concerning for FELICITY virus and Neuro syphillis. Pt reports memory problems and tremors x 2 weeks. LP cultures still pending.    Dr. López  also recommended Azithromycin ppx     Offered nursing home placement to assist with med compliance and for therapy but pt refused.  Discussed grave prognosis without compliance with medications- pt verbalized understanding and declined Hospice. He reported he will be compliant with HAART and antibiotics. Dr. López suspects HIV resistence to HAART- will refer pt back to his ID. With pt's permission I discussed plan of care and pt status with pt's daughter Sariah who verbalized understanding. Daughter and pt's spouse request SNF - but pt declined.     Pt continues to hallucinate- strange behavior escalating - refuses clothes and is urinating on himself.Fatigue and malaise improved. Still very weak.           Review of Systems   Constitutional: Positive for activity change, fatigue and unexpected weight change. Negative for appetite change, chills, diaphoresis and fever.   HENT: Negative for congestion, nosebleeds, sore throat and trouble swallowing.    Eyes: Negative for pain, discharge and visual disturbance.   Respiratory: Positive for shortness of breath. Negative for apnea, cough, chest tightness, wheezing and stridor.    Cardiovascular: Negative for chest pain, palpitations and leg swelling.   Gastrointestinal: Negative for abdominal distention, abdominal pain, blood in stool, constipation, diarrhea, nausea and vomiting.   Endocrine: Negative for cold intolerance and heat intolerance.   Genitourinary: Negative for difficulty urinating, dysuria, flank pain, frequency and urgency.   Musculoskeletal: Negative for arthralgias, back pain, joint swelling, myalgias, neck pain and  neck stiffness.   Skin: Negative for rash and wound.   Allergic/Immunologic: Negative for food allergies and immunocompromised state.   Neurological: Positive for tremors and weakness (generalized ). Negative for dizziness, seizures, syncope, facial asymmetry, light-headedness and headaches.   Hematological: Negative for adenopathy.   Psychiatric/Behavioral: Positive for behavioral problems, confusion and hallucinations. Negative for agitation. The patient is not nervous/anxious.      Objective:     Vital Signs (Most Recent):  Temp: 96.3 °F (35.7 °C) (11/17/17 1158)  Pulse: 110 (11/17/17 1158)  Resp: 18 (11/17/17 1158)  BP: 135/69 (11/17/17 1158)  SpO2: 95 % (11/17/17 1158) Vital Signs (24h Range):  Temp:  [96.3 °F (35.7 °C)-97.5 °F (36.4 °C)] 96.3 °F (35.7 °C)  Pulse:  [] 110  Resp:  [16-20] 18  SpO2:  [95 %-97 %] 95 %  BP: (122-139)/(60-76) 135/69     Weight: 97.5 kg (214 lb 15.2 oz)  Body mass index is 28.36 kg/m².    Intake/Output Summary (Last 24 hours) at 11/17/17 1241  Last data filed at 11/17/17 0904   Gross per 24 hour   Intake             1510 ml   Output             1550 ml   Net              -40 ml      Physical Exam   Constitutional: He is oriented to person, place, and time. He appears well-developed and well-nourished. No distress.   HENT:   Head: Normocephalic and atraumatic.   Nose: Nose normal.   Mouth/Throat: Oropharynx is clear and moist.   Eyes: Conjunctivae and EOM are normal. No scleral icterus.   Neck: Normal range of motion. Neck supple.   Cardiovascular: Normal rate, regular rhythm, normal heart sounds and intact distal pulses.  Exam reveals no gallop and no friction rub.    No murmur heard.  Pulmonary/Chest: Effort normal. No stridor. No respiratory distress. He has no wheezes. He has no rales. He exhibits no tenderness.   Diminished bilaterally    Abdominal: Soft. Bowel sounds are normal. He exhibits no distension. There is no tenderness. There is no rebound and no guarding.    Musculoskeletal: Normal range of motion. He exhibits no edema, tenderness or deformity.   Neurological: He is alert and oriented to person, place, and time. He has normal reflexes. No cranial nerve deficit. He exhibits normal muscle tone. Coordination abnormal.   Disoriented to place and situation at times   +tremors- worsening    Skin: Skin is warm and dry. No rash noted. He is not diaphoretic. No erythema. No pallor.   Psychiatric: He has a normal mood and affect. He expresses impulsivity.   Inappropriate behavior    Nursing note and vitals reviewed.      Significant Labs:   BMP:     Recent Labs  Lab 11/17/17 0357   GLU 96   *   K 4.9      CO2 20*   BUN 15   CREATININE 0.8   CALCIUM 10.1   MG 2.1     CBC:     Recent Labs  Lab 11/16/17 0558 11/17/17 0357   WBC 3.89* 4.36   HGB 12.1* 12.4*   HCT 34.7* 35.8*   * 120*     CMP:     Recent Labs  Lab 11/16/17 0558 11/17/17 0357    134*   K 4.8 4.9    107   CO2 21* 20*   * 96   BUN 16 15   CREATININE 0.8 0.8   CALCIUM 9.9 10.1   PROT 6.7 7.0   ALBUMIN 2.1* 2.2*   BILITOT 0.5 0.5   ALKPHOS 126 133   AST 68* 77*   ALT 53* 66*   ANIONGAP 8 7*   EGFRNONAA >60 >60       Significant Imaging:   Imaging Results          CT Head Without Contrast (Final result)  Result time 11/16/17 16:42:12    Final result by Wilberto Dillard MD (11/16/17 16:42:12)                 Impression:         Chronic microvascular ischemic changes.     All CT scans at this facility use dose modulation, iterative reconstruction and/or weight based dosing when appropriate to reduce radiation dose to as low as reasonably achievable.        Electronically signed by: WILBERTO DILLARD MD  Date:     11/16/17  Time:    16:42              Narrative:    CT OF THE HEAD WITHOUT CONTRAST:     Technique: 5 mm axial images were obtained from the skullbase to the vertex, without administration of contrast.    Comparison: None.    History: r/o bleed    Findings:    No intracranial  hemorrhage, extra-axial fluid collection, midline shift, or mass effect.  No evidence of an acute cortical infarct or of an infarct in a major vascular territory.    There is mild prominence of the ventricles and sulci compatible with diffuse cerebral volume loss.  Patchy hypoattenuation in the periventricular white matter regions is nonspecific, but most commonly seen with chronic microvascular ischemic changes. Vascular calcifications noted.    The calvarium is unremarkable. Visualized portions of the paranasal sinuses are clear. Visualized mastoid air cells are clear. Visualized orbits are unremarkable.                             FL Lumbar Puncture (xpd) (Final result)  Result time 11/16/17 11:03:14    Final result by Wilberto Dillard MD (11/16/17 11:03:14)                 Impression:     Technically successful fluoroscopic guided a lumbar puncture via a L2/3 translaminar approach. Approximately 11cc of clear cerebrospinal fluid was collected, placed in simple bottles, and sent to lab for analysis.       Electronically signed by: WILBERTO DILLARD MD  Date:     11/16/17  Time:    11:03              Narrative:    Procedure: Fluoroscopic guided lumbar puncture  Operators: Dimitrios.    History:  Confusion with HIV    Technique: Informed consent was obtained from the patient.  Following explanation of the risks, benefits and alternatives to the procedure including but not limited to bleeding, infection, nerve root/vascular injury, drug reaction, CSF leak/spinal headache, and herniation written consent was signed by the patient and placed in the patient's chart.     The patient was then placed on the fluoroscopic table in the prone position. On hand RN and RT assisted with patient positioning. The patient's back was then prepped and draped in sterile fashion. The L2/3 level was localized with fluoroscopic guidance. Using a 2 cc of a 1% lidocaine the overlying skin was anesthetized. Subsequently using a 20-gauge spinal  needle and fluoroscopic guidance access was obtained into the thecal sac via a left L3/L4 translaminar approach. Following removal of the stylet clear cerebrospinal fluid was identified spontaneously. Opening pressure was 8 cm of water. Approximately 11 cc was collected, placed in sample bottles, and sent to the lab for analysis. The stylet was then reinserted and the spinal needle was removed in full.      The patient tolerated the procedure well. No immediate postprocedure complications. Adhesive bandage placed over the puncture site.    Fluoroscopic time was 0.6   and a single fluoroscopic images were obtained.     Order placed in chart for strict bed rest next 3 hours.                             CT Head Without Contrast (Final result)  Result time 11/12/17 18:19:51    Final result by Leonor Marques III, MD (11/12/17 18:19:51)                 Impression:       1.  No acute intracranial disease identified.    2.  Mild age related atrophy and minimal chronic microvascular ischemic change.        Electronically signed by: LEONOR MARQUES MD  Date:     11/12/17  Time:    18:19              Narrative:    Head CT without contrast    Clinical history: R41.82 Altered mental status, unspecified; generalized weakness    TECHNIQUE: Routine noncontrast axial head CT. All CT scans at this facility use dose modulation, iterative reconstruction, and/or weight based dosing when appropriate to reduce radiation dose to as low as reasonably achievable.    Comparison: none    FINDINGS: There is mild diffuse age-related atrophy.  Atherosclerotic calcifications of the intracranial arteries are noted. There is minimal periventricular white matter hypodensity, most consistent with chronic microvascular ischemic change.  There is no CT evidence of acute infarct, intracranial hemorrhage, mass-effect,  obstructive hydrocephalus or other acute disease. The visualized paranasal sinuses and mastoid air cells are clear. No calvarial fracture is  identified.                             X-Ray Chest AP Portable (Final result)  Result time 11/12/17 16:46:20    Final result by Leonor Marques III, MD (11/12/17 16:46:20)                 Impression:     See above.        Electronically signed by: LEONOR MARQUES MD  Date:     11/12/17  Time:    16:46              Narrative:    XR CHEST AP PORTABLE    Clinical history: sepsis.      Findings: Cardiomediastinal silhouette is within normal limits for AP technique. There is mild elevation the right diaphragm was mild adjacent right infrahilar vascular crowding. There are symmetric band-shaped opacities in the bilateral medial upper lobes, possibly infiltrates or vascular congestion.  Opacity in the retrocardiac left lung base could be related to underpenetration or infiltrate.  The remainder the lungs appear clear of active disease.                            Assessment/Plan:      * PCP Pneumonia withy acute Hypoxemic Resp Failure     cont IV Bactrim   Duonebs.  Blood cultures show  NGTD  Hypoxia resolved        Encephalopathy acute    Hallucinations and inappropriate behavior worsening- concerning for neurosyphilis and PML   +RPR  Dr. López recommended initiating treatment for neurosyphilis with IV PCN           AIDS (acquired immunodeficiency syndrome), CD4 <=200    CD4 count 71, viral load of 6.5 million on 8/10/2017.  Patient has been off HAART since 4/2017 due to noncompliance.  Follows with Dr. Pete with [facility] infectious disease.  Care discussed with Infectious disease  Start Azithromycin ppx   Pt requests to be re-started on HAART  Pt underwent LP for Neuro syphilis and FELICITY virus - cultures pending - start IV PCN          Thrombocytopenia associated with AIDS    Monitor           Pressure ulcer of coccygeal region, stage 3    Wound care           Generalized weakness    PT/OT  Family requests SNF- pt refused HH arranged           Essential hypertension    BP controlled currently.  Continue home amlodipine.             VTE Risk Mitigation         Ordered     Medium Risk of VTE  Once      11/12/17 2054     Place sequential compression device  Until discontinued      11/12/17 2054              Lindsey Navarro NP  Department of Hospital Medicine   [facility]

## 2017-11-17 NOTE — PT/OT/SLP PROGRESS
Physical Therapy      Jeffrey Rosales  MRN: [id number]    PT CURRENTLY ON HOLD S/P LUMBAR PUNCTURE 11-16-17, PT WILL REQUIRE NEW P.T. ORDER FROM MD WHEN APPROPRIATE    Ashanti Joaquin, PT   11/17/2017  0815

## 2017-11-17 NOTE — SUBJECTIVE & OBJECTIVE
Interval History:   66 year old man with AIDS-    CD4 COUNT - 71,CD%-3.8.  VIRAL load is pending .  He has episodes of confusion.  He has past history of syphilis.    He was admitted with history of falls.  LP showed wbc 4 ,csf protein is 54.    He had episode of confusion yesterday     Review of Systems   Constitutional: Positive for activity change, fatigue and unexpected weight change. Negative for appetite change, chills, diaphoresis and fever.   HENT: Negative for congestion, nosebleeds, sore throat and trouble swallowing.    Eyes: Negative for pain, discharge and visual disturbance.   Respiratory: Positive for shortness of breath. Negative for apnea, cough, chest tightness, wheezing and stridor.    Cardiovascular: Negative for chest pain, palpitations and leg swelling.   Gastrointestinal: Negative for abdominal distention, abdominal pain, blood in stool, constipation, diarrhea, nausea and vomiting.   Endocrine: Negative for cold intolerance and heat intolerance.   Genitourinary: Negative for difficulty urinating, dysuria, flank pain, frequency and urgency.   Musculoskeletal: Negative for arthralgias, back pain, joint swelling, myalgias, neck pain and neck stiffness.   Skin: Negative for rash and wound.   Allergic/Immunologic: Negative for food allergies and immunocompromised state.   Neurological: Positive for weakness (generalized ). Negative for dizziness, seizures, syncope, facial asymmetry, light-headedness and headaches.        He has intermittent episodes of confusion and has history of falls.   Hematological: Negative for adenopathy.   Psychiatric/Behavioral: Positive for confusion. Negative for agitation and behavioral problems. The patient is not nervous/anxious.      Objective:     Vital Signs (Most Recent):  Temp: 96.6 °F (35.9 °C) (11/16/17 1244)  Pulse: 105 (11/16/17 1706)  Resp: 20 (11/16/17 1244)  BP: 139/76 (11/16/17 1244)  SpO2: 96 % (11/16/17 1244) Vital Signs (24h Range):  Temp:  [96.6 °F  (35.9 °C)-98.2 °F (36.8 °C)] 96.6 °F (35.9 °C)  Pulse:  [] 105  Resp:  [18-20] 20  SpO2:  [91 %-96 %] 96 %  BP: (130-144)/(62-76) 139/76     Weight: 97.5 kg (214 lb 15.2 oz)  Body mass index is 28.36 kg/m².    Estimated Creatinine Clearance: 110.1 mL/min (based on SCr of 0.8 mg/dL).    Physical Exam   Constitutional: He appears well-developed and well-nourished. He appears lethargic. He is sleeping. He is easily aroused. No distress.   HENT:   Head: Normocephalic and atraumatic.   Eyes: Conjunctivae and EOM are normal. Pupils are equal, round, and reactive to light.   Neck: Normal range of motion. Neck supple. No JVD present.   Cardiovascular: Regular rhythm, S1 normal, S2 normal and intact distal pulses.   No extrasystoles are present. Tachycardia present.  Exam reveals no gallop.    No murmur heard.  Pulses:       Radial pulses are 2+ on the right side, and 2+ on the left side.        Dorsalis pedis pulses are 2+ on the right side, and 2+ on the left side.        Posterior tibial pulses are 2+ on the right side, and 2+ on the left side.   Pulmonary/Chest: Effort normal and breath sounds normal. No accessory muscle usage. No tachypnea. No respiratory distress. He has no decreased breath sounds. He has no wheezes. He has no rales.   Abdominal: Soft. Bowel sounds are normal. He exhibits no distension. There is no tenderness. There is no rebound and no guarding.   Musculoskeletal: Normal range of motion. He exhibits no edema, tenderness or deformity.   Neurological: He is easily aroused. He appears lethargic. No cranial nerve deficit or sensory deficit. GCS eye subscore is 3. GCS verbal subscore is 5. GCS motor subscore is 6.   Not oriented to place,has intermittent episodes of confusion.   Skin: Skin is warm and dry. Capillary refill takes less than 2 seconds. Abrasion (midback, no drainage appreciated) noted. No rash noted. He is not diaphoretic. No erythema.   Psychiatric: His speech is normal. Thought  content normal. His mood appears not anxious. He is not actively hallucinating. Cognition and memory are normal. He does not exhibit a depressed mood. He is attentive.   Nursing note and vitals reviewed.      Significant Labs:   Blood Culture:     Recent Labs  Lab 11/12/17  1540 11/12/17  1555   LABBLOO No Growth to date  No Growth to date  No Growth to date  No Growth to date No Growth to date  No Growth to date  No Growth to date  No Growth to date     CBC:     Recent Labs  Lab 11/15/17  0500 11/16/17  0558   WBC 3.30* 3.89*   HGB 12.0* 12.1*   HCT 34.6* 34.7*   PLT 99* 103*     HIV 1/2 Antibodies: No results for input(s): [id number] in the last 48 hours.  All pertinent labs within the past 24 hours have been reviewed.    Significant Imaging: I have reviewed all pertinent imaging results/findings within the past 24 hours.

## 2017-11-17 NOTE — PLAN OF CARE
Problem: Patient Care Overview  Goal: Plan of Care Review  Patient had uneventful shift. Patient awake, alert and oriented x 4. VS stable. Patient denies pain or SOB. Patient on telemetry, sinus tach on the monitor Patient ambulates with assistance. Fall precautions in place. Bed alarm active and audible. Patient free from fall/injury. Plan of care reviewed with patient. Patient has no questions at this time. Will continue to monitor.

## 2017-11-17 NOTE — ASSESSMENT & PLAN NOTE
Hallucinations and inappropriate behavior worsening- concerning for neurosyphilis and PML   +RPR  Dr. López recommended initiating treatment for neurosyphilis with IV PCN

## 2017-11-17 NOTE — PROGRESS NOTES
[facility]  Infectious Disease  Progress Note    Patient Name: Jeffrey Rosales  MRN: [id number]  Admission Date: 11/12/2017  Length of Stay: 4 days  Attending Physician: Joe Jacob MD  Primary Care Provider: Provider [facility]    Isolation Status: No active isolations  Assessment/Plan:      * PCP Pneumonia withy acute Hypoxemic Resp Failure     Will continue therapy for presumed PCP with Bactrim and steroid.        Thrombocytopenia associated with AIDS    Likely related to AIDs.  Will check HIV genotype and will need to restart HAART.        Encephalopathy acute    With AIDS,will need to rule out neurosyphilis and PML .  Will follow CSF results  for FELICITY virus and CSF VDRL.      CSF wbc of 4 and protein of 45  Suggests lower likelihood of neurosyphillis.  Will follow CSF VDRL.        AIDS (acquired immunodeficiency syndrome), CD4 <=200    Latest cd4 count -08/2017 -247 with viral load of 6.4 million .  CD 4 count is 71-CD% -3.8.  Will add zithromax 1200mg for MAC prophylaxis.  Continue bactrim for PCP.      Dr Beaver at [facility] ID stopped his antiretrovirals during the last visit in August 2017.He is very poorly compliant with therapy .    Will follow HIV genotype to initiate HAART              Anticipated Disposition:     Thank you for your consult. I will follow-up with patient. Please contact us if you have any additional questions.    Jeffrey López MD  Infectious Disease  [facility]    Subjective:     Principal Problem:Pneumonia due to infectious organism    HPI: Patient is poor historian due to somnolence.  History obtained from patient's daughter , ED staff, and past medical record in Care Everywhere.  66 year old  male  with history of of HIV/AIDS-cd4 count on 08/2017-247,cd4% 28 ,HIV viral load -6.4 million , HCV, HTN, HLD, DM II, DDD with chronic back pain, PUD, and h/o anal cancer, who presented to the ED with c/o generalized weakness, fatigue, and malaise that has  progressively worsened over the past 2 weeks.  Associated subjective fevers, lethargy, and ~15-20 pound weight loss (unintentional) in past 3 months .  .  Patient's son also reports new abrasion to mid back that he noticed a few days ago with clear drainage.  Initial work-up in ED noted temp 100.6, , RR 23, O2 sat 92% on RA.  CT head with no acute process.  CTA of the chest -11/01-  2.  1.6 cm right lower lobe pulmonary nodule.    3.  Scattered pulmonary emphysema changes.    From the last ID follow up note on 08/27-[facility] reported that he has not picked up antiretrovirals in months. Last refill date for Tivicay February 2017 and Truvada April 2017.  He follows up with [facility] oncology for anal cancer.  At that visit ,his HAART therapy was stopped.    Interval History:   66 year old man with AIDS-    CD4 COUNT - 71,CD%-3.8.  VIRAL load is pending .  He has episodes of confusion.  He has past history of syphilis.    He was admitted with history of falls.  LP showed wbc 4 ,csf protein is 54.    He had episode of confusion yesterday     Review of Systems   Constitutional: Positive for activity change, fatigue and unexpected weight change. Negative for appetite change, chills, diaphoresis and fever.   HENT: Negative for congestion, nosebleeds, sore throat and trouble swallowing.    Eyes: Negative for pain, discharge and visual disturbance.   Respiratory: Positive for shortness of breath. Negative for apnea, cough, chest tightness, wheezing and stridor.    Cardiovascular: Negative for chest pain, palpitations and leg swelling.   Gastrointestinal: Negative for abdominal distention, abdominal pain, blood in stool, constipation, diarrhea, nausea and vomiting.   Endocrine: Negative for cold intolerance and heat intolerance.   Genitourinary: Negative for difficulty urinating, dysuria, flank pain, frequency and urgency.   Musculoskeletal: Negative for arthralgias, back pain, joint swelling, myalgias, neck pain and neck  stiffness.   Skin: Negative for rash and wound.   Allergic/Immunologic: Negative for food allergies and immunocompromised state.   Neurological: Positive for weakness (generalized ). Negative for dizziness, seizures, syncope, facial asymmetry, light-headedness and headaches.        He has intermittent episodes of confusion and has history of falls.   Hematological: Negative for adenopathy.   Psychiatric/Behavioral: Positive for confusion. Negative for agitation and behavioral problems. The patient is not nervous/anxious.      Objective:     Vital Signs (Most Recent):  Temp: 96.6 °F (35.9 °C) (11/16/17 1244)  Pulse: 105 (11/16/17 1706)  Resp: 20 (11/16/17 1244)  BP: 139/76 (11/16/17 1244)  SpO2: 96 % (11/16/17 1244) Vital Signs (24h Range):  Temp:  [96.6 °F (35.9 °C)-98.2 °F (36.8 °C)] 96.6 °F (35.9 °C)  Pulse:  [] 105  Resp:  [18-20] 20  SpO2:  [91 %-96 %] 96 %  BP: (130-144)/(62-76) 139/76     Weight: 97.5 kg (214 lb 15.2 oz)  Body mass index is 28.36 kg/m².    Estimated Creatinine Clearance: 110.1 mL/min (based on SCr of 0.8 mg/dL).    Physical Exam   Constitutional: He appears well-developed and well-nourished. He appears lethargic. He is sleeping. He is easily aroused. No distress.   HENT:   Head: Normocephalic and atraumatic.   Eyes: Conjunctivae and EOM are normal. Pupils are equal, round, and reactive to light.   Neck: Normal range of motion. Neck supple. No JVD present.   Cardiovascular: Regular rhythm, S1 normal, S2 normal and intact distal pulses.   No extrasystoles are present. Tachycardia present.  Exam reveals no gallop.    No murmur heard.  Pulses:       Radial pulses are 2+ on the right side, and 2+ on the left side.        Dorsalis pedis pulses are 2+ on the right side, and 2+ on the left side.        Posterior tibial pulses are 2+ on the right side, and 2+ on the left side.   Pulmonary/Chest: Effort normal and breath sounds normal. No accessory muscle usage. No tachypnea. No respiratory  distress. He has no decreased breath sounds. He has no wheezes. He has no rales.   Abdominal: Soft. Bowel sounds are normal. He exhibits no distension. There is no tenderness. There is no rebound and no guarding.   Musculoskeletal: Normal range of motion. He exhibits no edema, tenderness or deformity.   Neurological: He is easily aroused. He appears lethargic. No cranial nerve deficit or sensory deficit. GCS eye subscore is 3. GCS verbal subscore is 5. GCS motor subscore is 6.   Not oriented to place,has intermittent episodes of confusion.   Skin: Skin is warm and dry. Capillary refill takes less than 2 seconds. Abrasion (midback, no drainage appreciated) noted. No rash noted. He is not diaphoretic. No erythema.   Psychiatric: His speech is normal. Thought content normal. His mood appears not anxious. He is not actively hallucinating. Cognition and memory are normal. He does not exhibit a depressed mood. He is attentive.   Nursing note and vitals reviewed.      Significant Labs:   Blood Culture:     Recent Labs  Lab 11/12/17  1540 11/12/17  1555   LABBLOO No Growth to date  No Growth to date  No Growth to date  No Growth to date No Growth to date  No Growth to date  No Growth to date  No Growth to date     CBC:     Recent Labs  Lab 11/15/17  0500 11/16/17  0558   WBC 3.30* 3.89*   HGB 12.0* 12.1*   HCT 34.6* 34.7*   PLT 99* 103*     HIV 1/2 Antibodies: No results for input(s): [id number] in the last 48 hours.  All pertinent labs within the past 24 hours have been reviewed.    Significant Imaging: I have reviewed all pertinent imaging results/findings within the past 24 hours.

## 2017-11-17 NOTE — CONSULTS
Consult received for SNF placement.    CM met with patient at the bedside to discuss SNF placement.  Patient stated that he was not willing to go to a facility that he would have to stay overnight at. Patient is willing to accept home health services.

## 2017-11-17 NOTE — PROGRESS NOTES
TB Skin test placed to L forearm.  Pt tolerated well.     Lot # [id number]  Man: [employer] Pasteur  Exp: 5/31/19    48 hr read due on 11/18/17

## 2017-11-17 NOTE — ASSESSMENT & PLAN NOTE
Latest cd4 count -08/2017 -247 with viral load of 6.4 million .  CD 4 count is 71-CD% -3.8.  Will add zithromax 1200mg for MAC prophylaxis.  Continue bactrim for PCP.      Dr Beaver at [facility] ID stopped his antiretrovirals during the last visit in August 2017.He is very poorly compliant with therapy .    Will follow HIV genotype to initiate HAART

## 2017-11-17 NOTE — PT/OT/SLP PROGRESS
Occupational Therapy      Jeffrey Rosales  MRN: [id number]    RESUME O.T. ORDER CAME ACROSS PER DR. TANNER TODAY, ATTEMPTED RESUME WITH PT BUT PT DECLINED TX. STATING HE HAS BEEN UP AND MOVING ALL DAY AND IS TIRED, WILL ASSESS PT NEXT VISIT        Suzie Chaparro, OT   [id number]  11/17/2017

## 2017-11-18 NOTE — ASSESSMENT & PLAN NOTE
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 López recommended treatment for neurosyphilis with IV PCN until results are known

## 2017-11-18 NOTE — NURSING
Patient continues to get out of bed without assistance and setting off bed alarm. Patient urinating in dog bowl and cups, pt frequently redirected during that night but behavior persists. Patient moved to room 223 for safety reasons, family at bedside and aware of move. Will continue to monitor.

## 2017-11-18 NOTE — PLAN OF CARE
Problem: Occupational Therapy Goal  Goal: Occupational Therapy Goal  ot goals to be met by 11-22-17  1.  SBA with ue dressing  2. Min a  with le dressing  3. Pt armando;l tolerate 1 set x 15 reps b ue rom exercise with min resistance.  4. sba with bsc t/f's      Outcome: Ongoing (interventions implemented as appropriate)   PT WAS URINATING IN WHAT APPEARS TO BE A METAL DOG FOOD BOWL UPON ARRIVAL TO TREATMENT SESSION. PT PROGRESSING TOWARDS GOALS. WILLING TO PARTICIPATE TODAY. DONNED HOUSE SHOES WHILE SEATED EOB WITH SET-UP ASSISTANCE. PT STOOD WITH RW, MOD A WITH INCREASE POSTERIOR LEAN, REQUIRING MOD A FOR BALANCE AND SAFETY. STOOD ABOUT 3 MINUTES BEFORE REQUESTING TO SIT EOB. PT LEFT SEATED EOB WITH SPOUSE PRESENT, BED ALARM ON.

## 2017-11-18 NOTE — PLAN OF CARE
Problem: Patient Care Overview  Goal: Plan of Care Review  Outcome: Ongoing (interventions implemented as appropriate)  Patient stable. Plan of care reviewed. Patient verbalizes understanding. IV abx infusing. Patient moved to room 223 for safety reasons. Bed low, wheels locked, bed alarm on, call light in reach. Patient instructed to call for assistance. Will continue to monitor.

## 2017-11-18 NOTE — PROGRESS NOTES
[facility] Hospital Medicine  Progress Note    Patient Name: Jeffrey Rosales  MRN: [id number]  Patient Class: IP- Inpatient   Admission Date: 11/12/2017  Length of Stay: 6 days  Attending Physician: Tommy Myles MD  Primary Care Provider: Provider [employer]        Subjective:     Principal Problem:Pneumonia due to infectious organism    HPI:  Patient is poor historian due to somnolence.  History obtained from patient's son, ED staff, and past medical record in Care Everywhere.  Mr. Rosales is a 67 yo male  with a PMHx of HIV/AIDS, HCV, HTN, HLD, DM II, DDD with chronic back pain, PUD, and h/o anal cancer, who presented to the ED with c/o generalized weakness, fatigue, and malaise that has progressively worsened over the past 2 weeks.  Associated subjective fevers, lethargy, and ~15-20 pound weight loss (unintentional) in past 3 months .  Denies any chest pain, palpitations, SOB/STREET, cough, orthopnea, PND, edema, weight gain/loss, decreased appetite, ABD pain or distention, N/V/D, melena/rectal bleeding, dysuria, hematuria, lightheadedness/dizziness, HA, visual disturbance, focal deficit, syncope, diaphoresis, or chills.  Patient's son also reports new abrasion to mid back that he noticed a few days ago with clear drainage.  Initial work-up in ED noted temp 100.6, , RR 23, O2 sat 92% on RA.  ABG resulted pH 7.4, CO2 31, O2 52, HCO3 19.7.  UA unremarkable.  CT head with no acute process.  CXR with bilateral infiltrates consistent with PCP pneumonia.  Hospital Medicine was consulted for admission.  Currently, patient appears comfortable, in NAD.  He remains lethargic, but easily aroused.    Hospital Course:  Pt admitted with Acute hypoxic respiratory failure and PCP Pneumonia and started on IVF and IV Bactrim and IV steroids.  CD4 count is now 71, Viral load is 6.4 million. LDH elevated. There is documented noncompliance with HAART per Dr. Pete in care everywhere. Dr Beaver [facility] [place] [facility] stopped his  antiretrovirals during the last visit in August 2017.     Pt's confusion is worsening with hallucinations and strange behavior- but still often answering questions appropriately -Dr. lópez states behavior is concerning for FELICITY virus and Neuro syphillis. Pt reports memory problems and tremors x 2 weeks. LP cultures still pending.    Dr. López  also recommended Azithromycin ppx     Offered nursing home placement to assist with med compliance and for therapy but pt refused.  Discussed grave prognosis without compliance with medications- pt verbalized understanding and declined Hospice. He reported he will be compliant with HAART and antibiotics. Dr. López suspects HIV resistence to HAART- will refer pt back to his ID. With pt's permission I discussed plan of care and pt status with pt's daughter Sariah who verbalized understanding. Daughter and pt's spouse request SNF - but pt declined.     Pt continues to hallucinate- strange behavior escalating - refuses clothes and is urinating on himself.Fatigue and malaise improved. Still very weak.   RPR Positive- Dr. López recommends empiric treatment with IV penicillin         Review of Systems   Constitutional: Positive for activity change, fatigue and unexpected weight change. Negative for appetite change, chills, diaphoresis and fever.   HENT: Negative for congestion, nosebleeds, sore throat and trouble swallowing.    Eyes: Negative for pain, discharge and visual disturbance.   Respiratory: Positive for shortness of breath. Negative for apnea, cough, chest tightness, wheezing and stridor.    Cardiovascular: Negative for chest pain, palpitations and leg swelling.   Gastrointestinal: Negative for abdominal distention, abdominal pain, blood in stool, constipation, diarrhea, nausea and vomiting.   Endocrine: Negative for cold intolerance and heat intolerance.   Genitourinary: Negative for difficulty urinating, dysuria, flank pain, frequency and urgency.   Musculoskeletal:  Negative for arthralgias, back pain, joint swelling, myalgias, neck pain and neck stiffness.   Skin: Negative for rash and wound.   Allergic/Immunologic: Negative for food allergies and immunocompromised state.   Neurological: Positive for tremors and weakness (generalized ). Negative for dizziness, seizures, syncope, facial asymmetry, light-headedness and headaches.   Hematological: Negative for adenopathy.   Psychiatric/Behavioral: Positive for behavioral problems, confusion and hallucinations. Negative for agitation. The patient is not nervous/anxious.      Objective:     Vital Signs (Most Recent):  Temp: 98.8 °F (37.1 °C) (11/18/17 1124)  Pulse: 100 (11/18/17 1330)  Resp: 20 (11/18/17 1124)  BP: (!) 141/71 (11/18/17 1124)  SpO2: 96 % (11/18/17 1124) Vital Signs (24h Range):  Temp:  [97.5 °F (36.4 °C)-98.8 °F (37.1 °C)] 98.8 °F (37.1 °C)  Pulse:  [100-120] 100  Resp:  [17-20] 20  SpO2:  [92 %-96 %] 96 %  BP: (131-143)/(63-71) 141/71     Weight: 95 kg (209 lb 7 oz)  Body mass index is 27.63 kg/m².    Intake/Output Summary (Last 24 hours) at 11/18/17 1429  Last data filed at 11/18/17 0600   Gross per 24 hour   Intake                0 ml   Output              400 ml   Net             -400 ml      Physical Exam   Constitutional: He is oriented to person, place, and time. He appears well-developed and well-nourished. No distress.   HENT:   Head: Normocephalic and atraumatic.   Nose: Nose normal.   Mouth/Throat: Oropharynx is clear and moist.   Eyes: Conjunctivae and EOM are normal. No scleral icterus.   Neck: Normal range of motion. Neck supple.   Cardiovascular: Normal rate, regular rhythm, normal heart sounds and intact distal pulses.  Exam reveals no gallop and no friction rub.    No murmur heard.  Pulmonary/Chest: Effort normal. No stridor. No respiratory distress. He has no wheezes. He has no rales. He exhibits no tenderness.   Diminished bilaterally    Abdominal: Soft. Bowel sounds are normal. He exhibits no  distension. There is no tenderness. There is no rebound and no guarding.   Musculoskeletal: Normal range of motion. He exhibits no edema, tenderness or deformity.   Neurological: He is alert and oriented to person, place, and time. He has normal reflexes. No cranial nerve deficit. He exhibits normal muscle tone. Coordination abnormal.   Disoriented to place and situation at times   +tremors- worsening    Skin: Skin is warm and dry. No rash noted. He is not diaphoretic. No erythema. No pallor.   Psychiatric: He has a normal mood and affect. He expresses impulsivity.   Inappropriate behavior    Nursing note and vitals reviewed.      Significant Labs:   BMP:     Recent Labs  Lab 11/18/17 0445   GLU 86   *   K 5.2*      CO2 19*   BUN 16   CREATININE 0.8   CALCIUM 10.3   MG 2.1     CBC:     Recent Labs  Lab 11/17/17 0357 11/18/17 0445   WBC 4.36 4.57   HGB 12.4* 12.8*   HCT 35.8* 37.2*   * 99*     CMP:     Recent Labs  Lab 11/17/17 0357 11/18/17 0445   * 134*   K 4.9 5.2*    106   CO2 20* 19*   GLU 96 86   BUN 15 16   CREATININE 0.8 0.8   CALCIUM 10.1 10.3   PROT 7.0 7.0   ALBUMIN 2.2* 2.2*   BILITOT 0.5 0.6   ALKPHOS 133 156*   AST 77* 122*   ALT 66* 97*   ANIONGAP 7* 9   EGFRNONAA >60 >60       Significant Imaging:   Imaging Results          CT Head Without Contrast (Final result)  Result time 11/16/17 16:42:12    Final result by Wilberto Dillard MD (11/16/17 16:42:12)                 Impression:         Chronic microvascular ischemic changes.     All CT scans at this facility use dose modulation, iterative reconstruction and/or weight based dosing when appropriate to reduce radiation dose to as low as reasonably achievable.        Electronically signed by: WILBERTO DILLARD MD  Date:     11/16/17  Time:    16:42              Narrative:    CT OF THE HEAD WITHOUT CONTRAST:     Technique: 5 mm axial images were obtained from the skullbase to the vertex, without administration of  contrast.    Comparison: None.    History: r/o bleed    Findings:    No intracranial hemorrhage, extra-axial fluid collection, midline shift, or mass effect.  No evidence of an acute cortical infarct or of an infarct in a major vascular territory.    There is mild prominence of the ventricles and sulci compatible with diffuse cerebral volume loss.  Patchy hypoattenuation in the periventricular white matter regions is nonspecific, but most commonly seen with chronic microvascular ischemic changes. Vascular calcifications noted.    The calvarium is unremarkable. Visualized portions of the paranasal sinuses are clear. Visualized mastoid air cells are clear. Visualized orbits are unremarkable.                             FL Lumbar Puncture (xpd) (Final result)  Result time 11/16/17 11:03:14    Final result by Wilberto Dillard MD (11/16/17 11:03:14)                 Impression:     Technically successful fluoroscopic guided a lumbar puncture via a L2/3 translaminar approach. Approximately 11cc of clear cerebrospinal fluid was collected, placed in simple bottles, and sent to lab for analysis.       Electronically signed by: WILBERTO DILLARD MD  Date:     11/16/17  Time:    11:03              Narrative:    Procedure: Fluoroscopic guided lumbar puncture  Operators: Dimitrios.    History:  Confusion with HIV    Technique: Informed consent was obtained from the patient.  Following explanation of the risks, benefits and alternatives to the procedure including but not limited to bleeding, infection, nerve root/vascular injury, drug reaction, CSF leak/spinal headache, and herniation written consent was signed by the patient and placed in the patient's chart.     The patient was then placed on the fluoroscopic table in the prone position. On hand RN and RT assisted with patient positioning. The patient's back was then prepped and draped in sterile fashion. The L2/3 level was localized with fluoroscopic guidance. Using a 2 cc of a 1%  lidocaine the overlying skin was anesthetized. Subsequently using a 20-gauge spinal needle and fluoroscopic guidance access was obtained into the thecal sac via a left L3/L4 translaminar approach. Following removal of the stylet clear cerebrospinal fluid was identified spontaneously. Opening pressure was 8 cm of water. Approximately 11 cc was collected, placed in sample bottles, and sent to the lab for analysis. The stylet was then reinserted and the spinal needle was removed in full.      The patient tolerated the procedure well. No immediate postprocedure complications. Adhesive bandage placed over the puncture site.    Fluoroscopic time was 0.6   and a single fluoroscopic images were obtained.     Order placed in chart for strict bed rest next 3 hours.                             CT Head Without Contrast (Final result)  Result time 11/12/17 18:19:51    Final result by Leonor Marques III, MD (11/12/17 18:19:51)                 Impression:       1.  No acute intracranial disease identified.    2.  Mild age related atrophy and minimal chronic microvascular ischemic change.        Electronically signed by: LEONOR MARQUES MD  Date:     11/12/17  Time:    18:19              Narrative:    Head CT without contrast    Clinical history: R41.82 Altered mental status, unspecified; generalized weakness    TECHNIQUE: Routine noncontrast axial head CT. All CT scans at this facility use dose modulation, iterative reconstruction, and/or weight based dosing when appropriate to reduce radiation dose to as low as reasonably achievable.    Comparison: none    FINDINGS: There is mild diffuse age-related atrophy.  Atherosclerotic calcifications of the intracranial arteries are noted. There is minimal periventricular white matter hypodensity, most consistent with chronic microvascular ischemic change.  There is no CT evidence of acute infarct, intracranial hemorrhage, mass-effect,  obstructive hydrocephalus or other acute disease. The  visualized paranasal sinuses and mastoid air cells are clear. No calvarial fracture is identified.                             X-Ray Chest AP Portable (Final result)  Result time 11/12/17 16:46:20    Final result by Leonor Marques III, MD (11/12/17 16:46:20)                 Impression:     See above.        Electronically signed by: LEONOR MARQUES MD  Date:     11/12/17  Time:    16:46              Narrative:    XR CHEST AP PORTABLE    Clinical history: sepsis.      Findings: Cardiomediastinal silhouette is within normal limits for AP technique. There is mild elevation the right diaphragm was mild adjacent right infrahilar vascular crowding. There are symmetric band-shaped opacities in the bilateral medial upper lobes, possibly infiltrates or vascular congestion.  Opacity in the retrocardiac left lung base could be related to underpenetration or infiltrate.  The remainder the lungs appear clear of active disease.                            Assessment/Plan:      * PCP Pneumonia withy acute Hypoxemic Resp Failure     cont po Bactrim and prednisone   Duonebs.  Blood cultures show  NGTD  Hypoxia resolved  Monitor hyperkalemia         Encephalopathy acute    Hallucinations and inappropriate behavior worsening- concerning for neurosyphilis and PML s/p LP -CSF results  for FELICITY virus and CSF VDRL pending   +RPR  Dr. López recommended treatment for neurosyphilis with IV PCN until results are known           AIDS (acquired immunodeficiency syndrome), CD4 <=200    CD4 count 71, viral load of 6.5 million on 8/10/2017.  Patient has been off HAART since 4/2017 due to noncompliance.  Follows with Dr. Pete with [facility] infectious disease.  Care discussed with Infectious disease  Start Azithromycin ppx   Pt requests to be re-started on HAART  Pt underwent LP for Neuro syphilis and FELICITY virus - cultures pending - start IV PCN          Thrombocytopenia associated with AIDS    Monitor           Pressure ulcer of coccygeal region, stage 3     Wound care           Generalized weakness    PT/OT  Family requests SNF            Essential hypertension    BP controlled currently.  Continue home amlodipine.            VTE Risk Mitigation         Ordered     Medium Risk of VTE  Once      11/12/17 2054     Place sequential compression device  Until discontinued      11/12/17 2054              Lindsey Navarro NP  Department of Hospital Medicine   [facility]

## 2017-11-18 NOTE — PT/OT/SLP PROGRESS
Occupational Therapy  Treatment    Jeffrey Rosales   MRN: [id number]   Admitting Diagnosis: Pneumonia due to infectious organism    OT Date of Treatment: 11/18/17   OT Start Time: 1040  OT Stop Time: 1055  OT Total Time (min): 15 min    Billable Minutes:  Self Care/Home Management 15    General Precautions: Standard, fall  Orthopedic Precautions: N/A  Braces: N/A         Subjective:  Communicated with RN prior to session.    Pain/Comfort  Pain Rating 1: 0/10  Pain Rating Post-Intervention 1: 0/10    Objective:  Patient found with: bed alarm, oxygen, telemetry, peripheral IV     Functional Mobility:  Bed Mobility:  Rolling/Turning to Left: Minimum assistance  Rolling/Turning Right: Minimum assistance  Scooting/Bridging: Minimum Assistance  Supine to Sit: Minimum Assistance    Transfers:   Sit <> Stand Assistance: Moderate Assistance  Sit <> Stand Assistive Device: Rolling Walker  Bed <> Chair Technique:  (DID NOT OCCUR)    Functional Ambulation: STOOD WITH RW MOD A X3 MINUTES WITH POSTERIOR LEANING, REQUIRING MOD A FOR SAFETY. MAX TACTILE AND VERBAL CUES TO SHIFT WEIGHT FORWARD, HOWEVER PT UNABLE TO DO SO.    Activities of Daily Living:    LE Dressing Level of Assistance: Set-up Assistance (DONNGED HOUSE SHOES EOB)  Toileting Where Assessed: Other (Comment) (EOB)  Toileting Level of Assistance: Minimum assistance (PT'S SPOUSE ASSISTED WITH URINATING IN WHAT APPEARS TO BE A DOG FOOD BOWL)     Balance:   Static Sit: GOOD: Takes MODERATE challenges from all directions  Dynamic Sit: FAIR+: Maintains balance through MINIMAL excursions of active trunk motion  Static Stand: POOR: Needs MODERATE assist to maintain  Dynamic stand: 0: N/A    Therapeutic Activities and Exercises:   PT WAS URINATING IN WHAT APPEARS TO BE A METAL DOG FOOD BOWL UPON ARRIVAL TO TREATMENT SESSION. PT PROGRESSING TOWARDS GOALS. WILLING TO PARTICIPATE TODAY. DONNED HOUSE SHOES WHILE SEATED EOB WITH SET-UP ASSISTANCE. PT STOOD WITH RW, MOD A WITH INCREASE  "POSTERIOR LEAN, REQUIRING MOD A FOR BALANCE AND SAFETY. STOOD ABOUT 3 MINUTES BEFORE REQUESTING TO SIT EOB. PT LEFT SEATED EOB WITH SPOUSE PRESENT, BED ALARM ON.     AM-PAC 6 CLICK ADL   How much help from another person does this patient currently need?   1 = Unable, Total/Dependent Assistance  2 = A lot, Maximum/Moderate Assistance  3 = A little, Minimum/Contact Guard/Supervision  4 = None, Modified [place]/Independent    Putting on and taking off regular lower body clothing? : 2  Bathing (including washing, rinsing, drying)?: 2  Toileting, which includes using toilet, bedpan, or urinal? : 2  Putting on and taking off regular upper body clothing?: 2  Taking care of personal grooming such as brushing teeth?: 3  Eating meals?: 4  Total Score: 15     AM-PAC Raw Score CMS "G-Code Modifier Level of Impairment Assistance   6 % Total / Unable   7 - 8 CM 80 - 100% Maximal Assist   9-13 CL 60 - 80% Moderate Assist   14 - 19 CK 40 - 60% Moderate Assist   20 - 22 CJ 20 - 40% Minimal Assist   23 CI 1-20% SBA / CGA   24 CH 0% Independent/ Mod I       Patient left SEATED EOB with all lines intact, call button in reach, bed alarm on, NURSING notified and SPOUSE present    ASSESSMENT:  Jeffrey Rosales is a 66 y.o. male with a medical diagnosis of Pneumonia due to infectious organism and presents with DEBILITY, IMPAIRED ADLS, IMPAIRED FUNCTIONAL MOBILITY, AND IMPAIRED SAFETY AWARENESS AND INSIGHT. PT WILL BENEFIT FROM CONTINUED SKILLED OT SERVICES TO ADDRESS FUNCTIONAL IMPAIRMENTS.    Rehab identified problem list/impairments: Rehab identified problem list/impairments: weakness, impaired endurance, impaired self care skills, impaired functional mobilty, gait instability, impaired balance, impaired cognition, decreased coordination, decreased lower extremity function, decreased upper extremity function, decreased safety awareness, decreased ROM, impaired coordination    Rehab potential is good.    Activity tolerance: " Fair    Discharge recommendations: Discharge Facility/Level Of Care Needs: nursing facility, skilled     Barriers to discharge: Barriers to Discharge: Inaccessible home environment, Decreased caregiver support    Equipment recommendations: bedside commode, bath bench, walker, rolling     GOALS:    Occupational Therapy Goals        Problem: Occupational Therapy Goal    Goal Priority Disciplines Outcome Interventions   Occupational Therapy Goal     OT, PT/OT Ongoing (interventions implemented as appropriate)    Description:  ot goals to be met by 11-22-17  1.  SBA with ue dressing  2. Min a  with le dressing  3. Pt armando;l tolerate 1 set x 15 reps b ue rom exercise with min resistance.  4. sba with bsc t/f's                       Plan:  Patient to be seen 3 x/week to address the above listed problems via self-care/home management, therapeutic activities, therapeutic exercises  Plan of Care expires: 11/22/17  Plan of Care reviewed with: patient, spouse         Cadence Teresa OT  11/18/2017

## 2017-11-18 NOTE — PT/OT/SLP PROGRESS
Physical Therapy  Treatment    Jeffrey Rosales   MRN: [id number]   Admitting Diagnosis: Pneumonia due to infectious organism    PT Received On: 11/18/17  PT Start Time: 1210     PT Stop Time: 1235    PT Total Time (min): 25 min       Billable Minutes:  Therapeutic Activity 15       PT/PTA: PT             General Precautions: Standard, fall  Orthopedic Precautions: N/A   Braces:           Subjective:  Pt found urinated in bowl at bedside.  Agrees to tx next to bed    Pain/Comfort  Pain Rating 1: 0/10    Objective:   Patient found with: bed alarm, oxygen, peripheral IV    Functional Mobility:  Bed Mobility:        Transfers:  Sit <> Stand Assistance: Moderate Assistance  Sit <> Stand Assistive Device: Rolling Walker    Gait:   Gait Distance: did not perform today.  Pt only willing to perform activities next to bed.  Performed standing marching instead.  Gait Assistive Device: Rolling walker    Stairs:      Balance:   Static Sit: FAIR+: Able to take MINIMAL challenges from all directions  Dynamic Sit: FAIR+: Maintains balance through MINIMAL excursions of active trunk motion  Static Stand: POOR+: Needs MINIMAL assist to maintain  Dynamic stand: POOR: N/A     Therapeutic Activities and Exercises:  Performed standing marching 2 x 30 reps next to bed. Mod A for balance     AM-PAC 6 CLICK MOBILITY  How much help from another person does this patient currently need?   1 = Unable, Total/Dependent Assistance  2 = A lot, Maximum/Moderate Assistance  3 = A little, Minimum/Contact Guard/Supervision  4 = None, Modified [place]/Independent         AM-PAC Raw Score CMS G-Code Modifier Level of Impairment Assistance   6 % Total / Unable   7 - 9 CM 80 - 100% Maximal Assist   10 - 14 CL 60 - 80% Moderate Assist   15 - 19 CK 40 - 60% Moderate Assist   20 - 22 CJ 20 - 40% Minimal Assist   23 CI 1-20% SBA / CGA   24 CH 0% Independent/ Mod I     Patient left seated EOB  with all lines intact, call button in reach, bed alarm on  and wife present.    Assessment:  Transferred with mod A with RW.  Improved participation today    Rehab identified problem list/impairments: Rehab identified problem list/impairments: weakness, impaired endurance, impaired self care skills, impaired balance, gait instability, impaired functional mobilty, decreased upper extremity function    Rehab potential is good.    Activity tolerance: Good    Discharge recommendations: Discharge Facility/Level Of Care Needs: nursing facility, skilled     Barriers to discharge: Barriers to Discharge: Inaccessible home environment, Decreased caregiver support    Equipment recommendations: Equipment Needed After Discharge: bedside commode, bath bench, walker, rolling     GOALS:    Physical Therapy Goals        Problem: Physical Therapy Goal    Goal Priority Disciplines Outcome Goal Variances Interventions   Physical Therapy Goal     PT/OT, PT      Description:  LTG'S TO BE MET IN 7 DAYS (11-22-17)  1. PT WILL REQUIRE [facility] FOR BED MOBILITY  2. PT WILL REQUIRE [facility] FOR TF'S  3. PT WILL ' WITH RW AND MARCELA  4. PT WILL TOLERATE BLE THEREX X 20 REPS AROM  5. PT WILL DEMO F- DYNAMIC BALANCE DURING GAIT                       PLAN:    Patient to be seen 5 x/week  to address the above listed problems via gait training, therapeutic activities, therapeutic exercises  Plan of Care expires: 11/22/17  Plan of Care reviewed with: patient, spouse         Shiv Rodriguez, PT  11/18/2017

## 2017-11-18 NOTE — ASSESSMENT & PLAN NOTE
Latest cd4 count -08/2017 -247.  CD 4 count is 71-CD% -3.8.  Will add zithromax 1200mg for MAC prophylaxis.  Continue bactrim for PCP.  Will follow HIV genotype to initiate HAART

## 2017-11-18 NOTE — ASSESSMENT & PLAN NOTE
With AIDS,will need to rule out neurosyphilis and PML .  Will follow CSF results  for FELICITY virus and CSF VDRL.    Will start IV penicillin for presumed neurosyphillis until CSF VDRLis known

## 2017-11-18 NOTE — PROGRESS NOTES
[facility]  Infectious Disease  Progress Note    Patient Name: Jeffrey Rosales  MRN: [id number]  Admission Date: 11/12/2017  Length of Stay: 6 days  Attending Physician: Tommy Myles MD  Primary Care Provider: Provider [facility]    Isolation Status: No active isolations  Assessment/Plan:      * PCP Pneumonia withy acute Hypoxemic Resp Failure     Will continue therapy for presumed PCP with Bactrim and steroid.        Thrombocytopenia associated with AIDS    Likely related to AIDs.        Encephalopathy acute    With AIDS,will need to rule out neurosyphilis and PML .  Will follow CSF results  for FELICITY virus and CSF VDRL.    Will start IV penicillin for presumed neurosyphillis until CSF VDRLis known         AIDS (acquired immunodeficiency syndrome), CD4 <=200    Latest cd4 count -08/2017 -247.  CD 4 count is 71-CD% -3.8.  Will add zithromax 1200mg for MAC prophylaxis.  Continue bactrim for PCP.  Will follow HIV genotype to initiate HAART              Anticipated Disposition:     Thank you for your consult. I will follow-up with patient. Please contact us if you have any additional questions.    Jeffrey López MD  Infectious Disease  [facility]    Subjective:     Principal Problem:Pneumonia due to infectious organism    HPI: Patient is poor historian due to somnolence.  History obtained from patient's daughter , ED staff, and past medical record in Care Everywhere.  66 year old  male  with history of of HIV/AIDS-cd4 count on 08/2017-247,cd4% 28 ,HIV viral load -6.4 million , HCV, HTN, HLD, DM II, DDD with chronic back pain, PUD, and h/o anal cancer, who presented to the ED with c/o generalized weakness, fatigue, and malaise that has progressively worsened over the past 2 weeks.  Associated subjective fevers, lethargy, and ~15-20 pound weight loss (unintentional) in past 3 months .  .  Patient's son also reports new abrasion to mid back that he noticed a few days ago with clear drainage.   Initial work-up in ED noted temp 100.6, , RR 23, O2 sat 92% on RA.  CT head with no acute process.  CTA of the chest -11/01-  2.  1.6 cm right lower lobe pulmonary nodule.    3.  Scattered pulmonary emphysema changes.    From the last ID follow up note on 08/27-[facility] reported that he has not picked up antiretrovirals in months. Last refill date for Tivicay February 2017 and Truvada April 2017.  He follows up with [facility] oncology for anal cancer.  At that visit ,his HAART therapy was stopped.    Interval History:   66 year old man with AIDS-    CD4 COUNT - 71,CD%-3.8.  VIRAL load is 487,107  He has episodes of confusion.  He has past history of syphilis.    He was admitted with history of falls.  LP showed wbc 4 ,csf protein is 54.    He continues to have occasional  episode of confusion .    Review of Systems   Constitutional: Positive for activity change, fatigue and unexpected weight change. Negative for appetite change, chills, diaphoresis and fever.   HENT: Negative for congestion, nosebleeds, sore throat and trouble swallowing.    Eyes: Negative for pain, discharge and visual disturbance.   Respiratory: Positive for shortness of breath. Negative for apnea, cough, chest tightness, wheezing and stridor.    Cardiovascular: Negative for chest pain, palpitations and leg swelling.   Gastrointestinal: Negative for abdominal distention, abdominal pain, blood in stool, constipation, diarrhea, nausea and vomiting.   Endocrine: Negative for cold intolerance and heat intolerance.   Genitourinary: Negative for difficulty urinating, dysuria, flank pain, frequency and urgency.   Musculoskeletal: Negative for arthralgias, back pain, joint swelling, myalgias, neck pain and neck stiffness.   Skin: Negative for rash and wound.   Allergic/Immunologic: Negative for food allergies and immunocompromised state.   Neurological: Positive for weakness (generalized ). Negative for dizziness, seizures, syncope, facial asymmetry,  light-headedness and headaches.        He has intermittent episodes of confusion and has history of falls.   Hematological: Negative for adenopathy.   Psychiatric/Behavioral: Positive for confusion. Negative for agitation and behavioral problems. The patient is not nervous/anxious.      Objective:     Vital Signs (Most Recent):  Temp: 97.8 °F (36.6 °C) (11/18/17 0358)  Pulse: (!) 114 (11/18/17 0358)  Resp: 18 (11/18/17 0358)  BP: (!) 141/68 (11/18/17 0358)  SpO2: (!) 92 % (11/18/17 0358) Vital Signs (24h Range):  Temp:  [96.3 °F (35.7 °C)-97.9 °F (36.6 °C)] 97.8 °F (36.6 °C)  Pulse:  [101-114] 114  Resp:  [17-18] 18  SpO2:  [92 %-97 %] 92 %  BP: (128-143)/(61-71) 141/68     Weight: 95 kg (209 lb 7 oz)  Body mass index is 27.63 kg/m².    Estimated Creatinine Clearance: 101.3 mL/min (based on SCr of 0.8 mg/dL).    Physical Exam   Constitutional: He appears well-developed and well-nourished. He appears lethargic. He is sleeping. He is easily aroused. No distress.   HENT:   Head: Normocephalic and atraumatic.   Eyes: Conjunctivae and EOM are normal. Pupils are equal, round, and reactive to light.   Neck: Normal range of motion. Neck supple. No JVD present.   Cardiovascular: Regular rhythm, S1 normal, S2 normal and intact distal pulses.   No extrasystoles are present. Tachycardia present.  Exam reveals no gallop.    No murmur heard.  Pulses:       Radial pulses are 2+ on the right side, and 2+ on the left side.        Dorsalis pedis pulses are 2+ on the right side, and 2+ on the left side.        Posterior tibial pulses are 2+ on the right side, and 2+ on the left side.   Pulmonary/Chest: Effort normal and breath sounds normal. No accessory muscle usage. No tachypnea. No respiratory distress. He has no decreased breath sounds. He has no wheezes. He has no rales.   Abdominal: Soft. Bowel sounds are normal. He exhibits no distension. There is no tenderness. There is no rebound and no guarding.   Musculoskeletal: Normal  range of motion. He exhibits no edema, tenderness or deformity.   Neurological: He is easily aroused. He appears lethargic. No cranial nerve deficit or sensory deficit. GCS eye subscore is 3. GCS verbal subscore is 5. GCS motor subscore is 6.   Not oriented to place,has intermittent episodes of confusion.   Skin: Skin is warm and dry. Capillary refill takes less than 2 seconds. Abrasion (midback, no drainage appreciated) noted. No rash noted. He is not diaphoretic. No erythema.   Psychiatric: His speech is normal. Thought content normal. His mood appears not anxious. He is not actively hallucinating. Cognition and memory are normal. He does not exhibit a depressed mood. He is attentive.   Nursing note and vitals reviewed.      Significant Labs:   Blood Culture:     Recent Labs  Lab 11/12/17  1540 11/12/17  1555   LABBLOO No growth after 5 days. No growth after 5 days.     CBC:     Recent Labs  Lab 11/17/17  0357   WBC 4.36   HGB 12.4*   HCT 35.8*   *     HIV 1/2 Antibodies: No results for input(s): [id number] in the last 48 hours.  All pertinent labs within the past 24 hours have been reviewed.    Significant Imaging: I have reviewed all pertinent imaging results/findings within the past 24 hours.

## 2017-11-18 NOTE — SUBJECTIVE & OBJECTIVE
Interval History:   66 year old man with AIDS-    CD4 COUNT - 71,CD%-3.8.  VIRAL load is 487,107  He has episodes of confusion.  He has past history of syphilis.    He was admitted with history of falls.  LP showed wbc 4 ,csf protein is 54.    He continues to have occasional  episode of confusion .    Review of Systems   Constitutional: Positive for activity change, fatigue and unexpected weight change. Negative for appetite change, chills, diaphoresis and fever.   HENT: Negative for congestion, nosebleeds, sore throat and trouble swallowing.    Eyes: Negative for pain, discharge and visual disturbance.   Respiratory: Positive for shortness of breath. Negative for apnea, cough, chest tightness, wheezing and stridor.    Cardiovascular: Negative for chest pain, palpitations and leg swelling.   Gastrointestinal: Negative for abdominal distention, abdominal pain, blood in stool, constipation, diarrhea, nausea and vomiting.   Endocrine: Negative for cold intolerance and heat intolerance.   Genitourinary: Negative for difficulty urinating, dysuria, flank pain, frequency and urgency.   Musculoskeletal: Negative for arthralgias, back pain, joint swelling, myalgias, neck pain and neck stiffness.   Skin: Negative for rash and wound.   Allergic/Immunologic: Negative for food allergies and immunocompromised state.   Neurological: Positive for weakness (generalized ). Negative for dizziness, seizures, syncope, facial asymmetry, light-headedness and headaches.        He has intermittent episodes of confusion and has history of falls.   Hematological: Negative for adenopathy.   Psychiatric/Behavioral: Positive for confusion. Negative for agitation and behavioral problems. The patient is not nervous/anxious.      Objective:     Vital Signs (Most Recent):  Temp: 97.8 °F (36.6 °C) (11/18/17 0358)  Pulse: (!) 114 (11/18/17 0358)  Resp: 18 (11/18/17 0358)  BP: (!) 141/68 (11/18/17 0358)  SpO2: (!) 92 % (11/18/17 0358) Vital Signs (24h  Range):  Temp:  [96.3 °F (35.7 °C)-97.9 °F (36.6 °C)] 97.8 °F (36.6 °C)  Pulse:  [101-114] 114  Resp:  [17-18] 18  SpO2:  [92 %-97 %] 92 %  BP: (128-143)/(61-71) 141/68     Weight: 95 kg (209 lb 7 oz)  Body mass index is 27.63 kg/m².    Estimated Creatinine Clearance: 101.3 mL/min (based on SCr of 0.8 mg/dL).    Physical Exam   Constitutional: He appears well-developed and well-nourished. He appears lethargic. He is sleeping. He is easily aroused. No distress.   HENT:   Head: Normocephalic and atraumatic.   Eyes: Conjunctivae and EOM are normal. Pupils are equal, round, and reactive to light.   Neck: Normal range of motion. Neck supple. No JVD present.   Cardiovascular: Regular rhythm, S1 normal, S2 normal and intact distal pulses.   No extrasystoles are present. Tachycardia present.  Exam reveals no gallop.    No murmur heard.  Pulses:       Radial pulses are 2+ on the right side, and 2+ on the left side.        Dorsalis pedis pulses are 2+ on the right side, and 2+ on the left side.        Posterior tibial pulses are 2+ on the right side, and 2+ on the left side.   Pulmonary/Chest: Effort normal and breath sounds normal. No accessory muscle usage. No tachypnea. No respiratory distress. He has no decreased breath sounds. He has no wheezes. He has no rales.   Abdominal: Soft. Bowel sounds are normal. He exhibits no distension. There is no tenderness. There is no rebound and no guarding.   Musculoskeletal: Normal range of motion. He exhibits no edema, tenderness or deformity.   Neurological: He is easily aroused. He appears lethargic. No cranial nerve deficit or sensory deficit. GCS eye subscore is 3. GCS verbal subscore is 5. GCS motor subscore is 6.   Not oriented to place,has intermittent episodes of confusion.   Skin: Skin is warm and dry. Capillary refill takes less than 2 seconds. Abrasion (midback, no drainage appreciated) noted. No rash noted. He is not diaphoretic. No erythema.   Psychiatric: His speech is  normal. Thought content normal. His mood appears not anxious. He is not actively hallucinating. Cognition and memory are normal. He does not exhibit a depressed mood. He is attentive.   Nursing note and vitals reviewed.      Significant Labs:   Blood Culture:     Recent Labs  Lab 11/12/17  1540 11/12/17  1555   LABBLOO No growth after 5 days. No growth after 5 days.     CBC:     Recent Labs  Lab 11/17/17  0357   WBC 4.36   HGB 12.4*   HCT 35.8*   *     HIV 1/2 Antibodies: No results for input(s): [id number] in the last 48 hours.  All pertinent labs within the past 24 hours have been reviewed.    Significant Imaging: I have reviewed all pertinent imaging results/findings within the past 24 hours.

## 2017-11-18 NOTE — SUBJECTIVE & OBJECTIVE
Review of Systems   Constitutional: Positive for activity change, fatigue and unexpected weight change. Negative for appetite change, chills, diaphoresis and fever.   HENT: Negative for congestion, nosebleeds, sore throat and trouble swallowing.    Eyes: Negative for pain, discharge and visual disturbance.   Respiratory: Positive for shortness of breath. Negative for apnea, cough, chest tightness, wheezing and stridor.    Cardiovascular: Negative for chest pain, palpitations and leg swelling.   Gastrointestinal: Negative for abdominal distention, abdominal pain, blood in stool, constipation, diarrhea, nausea and vomiting.   Endocrine: Negative for cold intolerance and heat intolerance.   Genitourinary: Negative for difficulty urinating, dysuria, flank pain, frequency and urgency.   Musculoskeletal: Negative for arthralgias, back pain, joint swelling, myalgias, neck pain and neck stiffness.   Skin: Negative for rash and wound.   Allergic/Immunologic: Negative for food allergies and immunocompromised state.   Neurological: Positive for tremors and weakness (generalized ). Negative for dizziness, seizures, syncope, facial asymmetry, light-headedness and headaches.   Hematological: Negative for adenopathy.   Psychiatric/Behavioral: Positive for behavioral problems, confusion and hallucinations. Negative for agitation. The patient is not nervous/anxious.      Objective:     Vital Signs (Most Recent):  Temp: 98.8 °F (37.1 °C) (11/18/17 1124)  Pulse: 100 (11/18/17 1330)  Resp: 20 (11/18/17 1124)  BP: (!) 141/71 (11/18/17 1124)  SpO2: 96 % (11/18/17 1124) Vital Signs (24h Range):  Temp:  [97.5 °F (36.4 °C)-98.8 °F (37.1 °C)] 98.8 °F (37.1 °C)  Pulse:  [100-120] 100  Resp:  [17-20] 20  SpO2:  [92 %-96 %] 96 %  BP: (131-143)/(63-71) 141/71     Weight: 95 kg (209 lb 7 oz)  Body mass index is 27.63 kg/m².    Intake/Output Summary (Last 24 hours) at 11/18/17 1429  Last data filed at 11/18/17 0600   Gross per 24 hour   Intake                 0 ml   Output              400 ml   Net             -400 ml      Physical Exam   Constitutional: He is oriented to person, place, and time. He appears well-developed and well-nourished. No distress.   HENT:   Head: Normocephalic and atraumatic.   Nose: Nose normal.   Mouth/Throat: Oropharynx is clear and moist.   Eyes: Conjunctivae and EOM are normal. No scleral icterus.   Neck: Normal range of motion. Neck supple.   Cardiovascular: Normal rate, regular rhythm, normal heart sounds and intact distal pulses.  Exam reveals no gallop and no friction rub.    No murmur heard.  Pulmonary/Chest: Effort normal. No stridor. No respiratory distress. He has no wheezes. He has no rales. He exhibits no tenderness.   Diminished bilaterally    Abdominal: Soft. Bowel sounds are normal. He exhibits no distension. There is no tenderness. There is no rebound and no guarding.   Musculoskeletal: Normal range of motion. He exhibits no edema, tenderness or deformity.   Neurological: He is alert and oriented to person, place, and time. He has normal reflexes. No cranial nerve deficit. He exhibits normal muscle tone. Coordination abnormal.   Disoriented to place and situation at times   +tremors- worsening    Skin: Skin is warm and dry. No rash noted. He is not diaphoretic. No erythema. No pallor.   Psychiatric: He has a normal mood and affect. He expresses impulsivity.   Inappropriate behavior    Nursing note and vitals reviewed.      Significant Labs:   BMP:     Recent Labs  Lab 11/18/17 0445   GLU 86   *   K 5.2*      CO2 19*   BUN 16   CREATININE 0.8   CALCIUM 10.3   MG 2.1     CBC:     Recent Labs  Lab 11/17/17 0357 11/18/17 0445   WBC 4.36 4.57   HGB 12.4* 12.8*   HCT 35.8* 37.2*   * 99*     CMP:     Recent Labs  Lab 11/17/17 0357 11/18/17 0445   * 134*   K 4.9 5.2*    106   CO2 20* 19*   GLU 96 86   BUN 15 16   CREATININE 0.8 0.8   CALCIUM 10.1 10.3   PROT 7.0 7.0   ALBUMIN 2.2* 2.2*    BILITOT 0.5 0.6   ALKPHOS 133 156*   AST 77* 122*   ALT 66* 97*   ANIONGAP 7* 9   EGFRNONAA >60 >60       Significant Imaging:   Imaging Results          CT Head Without Contrast (Final result)  Result time 11/16/17 16:42:12    Final result by Wilberto Dillard MD (11/16/17 16:42:12)                 Impression:         Chronic microvascular ischemic changes.     All CT scans at this facility use dose modulation, iterative reconstruction and/or weight based dosing when appropriate to reduce radiation dose to as low as reasonably achievable.        Electronically signed by: WILBERTO DILLARD MD  Date:     11/16/17  Time:    16:42              Narrative:    CT OF THE HEAD WITHOUT CONTRAST:     Technique: 5 mm axial images were obtained from the skullbase to the vertex, without administration of contrast.    Comparison: None.    History: r/o bleed    Findings:    No intracranial hemorrhage, extra-axial fluid collection, midline shift, or mass effect.  No evidence of an acute cortical infarct or of an infarct in a major vascular territory.    There is mild prominence of the ventricles and sulci compatible with diffuse cerebral volume loss.  Patchy hypoattenuation in the periventricular white matter regions is nonspecific, but most commonly seen with chronic microvascular ischemic changes. Vascular calcifications noted.    The calvarium is unremarkable. Visualized portions of the paranasal sinuses are clear. Visualized mastoid air cells are clear. Visualized orbits are unremarkable.                             FL Lumbar Puncture (xpd) (Final result)  Result time 11/16/17 11:03:14    Final result by Wilberto Dillard MD (11/16/17 11:03:14)                 Impression:     Technically successful fluoroscopic guided a lumbar puncture via a L2/3 translaminar approach. Approximately 11cc of clear cerebrospinal fluid was collected, placed in simple bottles, and sent to lab for analysis.       Electronically signed by: WILBERTO DILLARD  MD  Date:     11/16/17  Time:    11:03              Narrative:    Procedure: Fluoroscopic guided lumbar puncture  Operators: Dimitrios.    History:  Confusion with HIV    Technique: Informed consent was obtained from the patient.  Following explanation of the risks, benefits and alternatives to the procedure including but not limited to bleeding, infection, nerve root/vascular injury, drug reaction, CSF leak/spinal headache, and herniation written consent was signed by the patient and placed in the patient's chart.     The patient was then placed on the fluoroscopic table in the prone position. On hand RN and RT assisted with patient positioning. The patient's back was then prepped and draped in sterile fashion. The L2/3 level was localized with fluoroscopic guidance. Using a 2 cc of a 1% lidocaine the overlying skin was anesthetized. Subsequently using a 20-gauge spinal needle and fluoroscopic guidance access was obtained into the thecal sac via a left L3/L4 translaminar approach. Following removal of the stylet clear cerebrospinal fluid was identified spontaneously. Opening pressure was 8 cm of water. Approximately 11 cc was collected, placed in sample bottles, and sent to the lab for analysis. The stylet was then reinserted and the spinal needle was removed in full.      The patient tolerated the procedure well. No immediate postprocedure complications. Adhesive bandage placed over the puncture site.    Fluoroscopic time was 0.6   and a single fluoroscopic images were obtained.     Order placed in chart for strict bed rest next 3 hours.                             CT Head Without Contrast (Final result)  Result time 11/12/17 18:19:51    Final result by Leonor Marques III, MD (11/12/17 18:19:51)                 Impression:       1.  No acute intracranial disease identified.    2.  Mild age related atrophy and minimal chronic microvascular ischemic change.        Electronically signed by: LEONOR MARQUES MD  Date:      11/12/17  Time:    18:19              Narrative:    Head CT without contrast    Clinical history: R41.82 Altered mental status, unspecified; generalized weakness    TECHNIQUE: Routine noncontrast axial head CT. All CT scans at this facility use dose modulation, iterative reconstruction, and/or weight based dosing when appropriate to reduce radiation dose to as low as reasonably achievable.    Comparison: none    FINDINGS: There is mild diffuse age-related atrophy.  Atherosclerotic calcifications of the intracranial arteries are noted. There is minimal periventricular white matter hypodensity, most consistent with chronic microvascular ischemic change.  There is no CT evidence of acute infarct, intracranial hemorrhage, mass-effect,  obstructive hydrocephalus or other acute disease. The visualized paranasal sinuses and mastoid air cells are clear. No calvarial fracture is identified.                             X-Ray Chest AP Portable (Final result)  Result time 11/12/17 16:46:20    Final result by Leonor Marques III, MD (11/12/17 16:46:20)                 Impression:     See above.        Electronically signed by: LEONOR MARQUES MD  Date:     11/12/17  Time:    16:46              Narrative:    XR CHEST AP PORTABLE    Clinical history: sepsis.      Findings: Cardiomediastinal silhouette is within normal limits for AP technique. There is mild elevation the right diaphragm was mild adjacent right infrahilar vascular crowding. There are symmetric band-shaped opacities in the bilateral medial upper lobes, possibly infiltrates or vascular congestion.  Opacity in the retrocardiac left lung base could be related to underpenetration or infiltrate.  The remainder the lungs appear clear of active disease.

## 2017-11-18 NOTE — PROGRESS NOTES
"Met with patient's wife at patient's bedside. She stated that they agree with recommendation of "rehab" at SNF level of care. She acknowledged that she could not care for him at home until he becomes stronger. She noted that her daughter has the list of providers and that they will review the list and provide their preference on Monday. She said that her daughter is wanting a facility in [place] which will be close to her.   Wife stated that she will call LAILA Cornejo on Monday with her preference.   "

## 2017-11-18 NOTE — PLAN OF CARE
Problem: Patient Care Overview  Goal: Plan of Care Review  Outcome: Ongoing (interventions implemented as appropriate)  Patient awake, alert and oriented x 4, but confused at times. VS stable. Patient denies pain or SOB. Patient on telemetry, sinus tach on the monitor. Patient ambulates with assistance. Fall precautions in place. Bed alarm active and audible. Patient free from fall/injury. Plan of care reviewed with patient. Patient has no questions at this time. Will continue to monitor.

## 2017-11-19 NOTE — SUBJECTIVE & OBJECTIVE
Interval History:   66 year old man with AIDS-    CD4 COUNT - 71,CD%-3.8.  VIRAL load is 487,107  He has episodes of confusion.  He has past history of syphilis.    He was admitted with history of falls.  LP showed wbc 4 ,csf protein is 54.    He continues to have occasional  episode of confusion .  He remains afebrile.    Review of Systems   Constitutional: Positive for activity change, fatigue and unexpected weight change. Negative for appetite change, chills, diaphoresis and fever.   HENT: Negative for congestion, nosebleeds, sore throat and trouble swallowing.    Eyes: Negative for pain, discharge and visual disturbance.   Respiratory: Positive for shortness of breath. Negative for apnea, cough, chest tightness, wheezing and stridor.    Cardiovascular: Negative for chest pain, palpitations and leg swelling.   Gastrointestinal: Negative for abdominal distention, abdominal pain, blood in stool, constipation, diarrhea, nausea and vomiting.   Endocrine: Negative for cold intolerance and heat intolerance.   Genitourinary: Negative for difficulty urinating, dysuria, flank pain, frequency and urgency.   Musculoskeletal: Negative for arthralgias, back pain, joint swelling, myalgias, neck pain and neck stiffness.   Skin: Negative for rash and wound.   Allergic/Immunologic: Negative for food allergies and immunocompromised state.   Neurological: Positive for weakness (generalized ). Negative for dizziness, seizures, syncope, facial asymmetry, light-headedness and headaches.        He has intermittent episodes of confusion and has history of falls.   Hematological: Negative for adenopathy.   Psychiatric/Behavioral: Positive for confusion. Negative for agitation and behavioral problems. The patient is not nervous/anxious.      Objective:     Vital Signs (Most Recent):  Temp: 97.8 °F (36.6 °C) (11/19/17 0417)  Pulse: (!) 111 (11/19/17 0417)  Resp: 18 (11/19/17 0417)  BP: (!) 175/98 (11/19/17 0417)  SpO2: 96 % (11/19/17 0417)  Vital Signs (24h Range):  Temp:  [97.2 °F (36.2 °C)-98.8 °F (37.1 °C)] 97.8 °F (36.6 °C)  Pulse:  [100-120] 111  Resp:  [18-20] 18  SpO2:  [92 %-97 %] 96 %  BP: (132-175)/(65-98) 175/98     Weight: 90.8 kg (200 lb 2.8 oz)  Body mass index is 26.41 kg/m².    Estimated Creatinine Clearance: 101.3 mL/min (based on SCr of 0.8 mg/dL).    Physical Exam   Constitutional: He appears well-developed and well-nourished. He appears lethargic. He is sleeping. He is easily aroused. No distress.   HENT:   Head: Normocephalic and atraumatic.   Eyes: Conjunctivae and EOM are normal. Pupils are equal, round, and reactive to light.   Neck: Normal range of motion. Neck supple. No JVD present.   Cardiovascular: Regular rhythm, S1 normal, S2 normal and intact distal pulses.   No extrasystoles are present. Tachycardia present.  Exam reveals no gallop.    No murmur heard.  Pulses:       Radial pulses are 2+ on the right side, and 2+ on the left side.        Dorsalis pedis pulses are 2+ on the right side, and 2+ on the left side.        Posterior tibial pulses are 2+ on the right side, and 2+ on the left side.   Pulmonary/Chest: Effort normal and breath sounds normal. No accessory muscle usage. No tachypnea. No respiratory distress. He has no decreased breath sounds. He has no wheezes. He has no rales.   Abdominal: Soft. Bowel sounds are normal. He exhibits no distension. There is no tenderness. There is no rebound and no guarding.   Musculoskeletal: Normal range of motion. He exhibits no edema, tenderness or deformity.   Neurological: He is easily aroused. He appears lethargic. No cranial nerve deficit or sensory deficit. GCS eye subscore is 3. GCS verbal subscore is 5. GCS motor subscore is 6.   Not oriented to place,has intermittent episodes of confusion.   Skin: Skin is warm and dry. Capillary refill takes less than 2 seconds. Abrasion (midback, no drainage appreciated) noted. No rash noted. He is not diaphoretic. No erythema.    Psychiatric: His speech is normal. Thought content normal. His mood appears not anxious. He is not actively hallucinating. Cognition and memory are normal. He does not exhibit a depressed mood. He is attentive.   Nursing note and vitals reviewed.      Significant Labs:   Blood Culture:     Recent Labs  Lab 11/12/17  1540 11/12/17  1555   LABBLOO No growth after 5 days. No growth after 5 days.     CBC:     Recent Labs  Lab 11/18/17  0445 11/19/17  0518   WBC 4.57 3.81*   HGB 12.8* 12.5*   HCT 37.2* 36.5*   PLT 99* 90*     HIV 1/2 Antibodies: No results for input(s): [id number] in the last 48 hours.  All pertinent labs within the past 24 hours have been reviewed.    Significant Imaging: I have reviewed all pertinent imaging results/findings within the past 24 hours.

## 2017-11-19 NOTE — SUBJECTIVE & OBJECTIVE
Review of Systems   Constitutional: Positive for activity change and unexpected weight change. Negative for appetite change, chills, diaphoresis, fatigue and fever.   HENT: Negative for congestion, nosebleeds, sore throat and trouble swallowing.    Eyes: Negative for pain, discharge and visual disturbance.   Respiratory: Negative for apnea, cough, chest tightness, shortness of breath, wheezing and stridor.    Cardiovascular: Negative for chest pain, palpitations and leg swelling.   Gastrointestinal: Negative for abdominal distention, abdominal pain, blood in stool, constipation, diarrhea, nausea and vomiting.   Endocrine: Negative for cold intolerance and heat intolerance.   Genitourinary: Negative for difficulty urinating, dysuria, flank pain, frequency and urgency.   Musculoskeletal: Negative for arthralgias, back pain, joint swelling, myalgias, neck pain and neck stiffness.   Skin: Negative for rash and wound.   Allergic/Immunologic: Negative for food allergies and immunocompromised state.   Neurological: Positive for tremors. Negative for dizziness, seizures, syncope, facial asymmetry, weakness, light-headedness and headaches.   Hematological: Negative for adenopathy.   Psychiatric/Behavioral: Positive for hallucinations. Negative for agitation, behavioral problems and confusion. The patient is not nervous/anxious.      Objective:     Vital Signs (Most Recent):  Temp: 98.3 °F (36.8 °C) (11/19/17 1309)  Pulse: (!) 112 (11/19/17 1309)  Resp: 20 (11/19/17 1309)  BP: 120/69 (11/19/17 1309)  SpO2: (!) 90 % (11/19/17 1309) Vital Signs (24h Range):  Temp:  [97.2 °F (36.2 °C)-98.6 °F (37 °C)] 98.3 °F (36.8 °C)  Pulse:  [102-117] 112  Resp:  [18-20] 20  SpO2:  [90 %-97 %] 90 %  BP: (120-175)/(69-98) 120/69     Weight: 90.8 kg (200 lb 2.8 oz)  Body mass index is 26.41 kg/m².    Intake/Output Summary (Last 24 hours) at 11/19/17 1427  Last data filed at 11/19/17 0700   Gross per 24 hour   Intake                0 ml    Output             1350 ml   Net            -1350 ml      Physical Exam   Constitutional: He is oriented to person, place, and time. He appears well-developed and well-nourished. No distress.   HENT:   Head: Normocephalic and atraumatic.   Nose: Nose normal.   Mouth/Throat: Oropharynx is clear and moist.   Eyes: Conjunctivae and EOM are normal. No scleral icterus.   Neck: Normal range of motion. Neck supple.   Cardiovascular: Normal rate, regular rhythm, normal heart sounds and intact distal pulses.  Exam reveals no gallop and no friction rub.    No murmur heard.  Pulmonary/Chest: Effort normal. No stridor. No respiratory distress. He has no wheezes. He has no rales. He exhibits no tenderness.   Diminished bilaterally    Abdominal: Soft. Bowel sounds are normal. He exhibits no distension. There is no tenderness. There is no rebound and no guarding.   Musculoskeletal: Normal range of motion. He exhibits no edema, tenderness or deformity.   Neurological: He is alert and oriented to person, place, and time. He has normal reflexes. No cranial nerve deficit. He exhibits normal muscle tone. Coordination abnormal.   Disoriented to place and situation at times   +tremors- worsening    Skin: Skin is warm and dry. No rash noted. He is not diaphoretic. No erythema. No pallor.   Psychiatric: He has a normal mood and affect. He expresses impulsivity.   Inappropriate behavior    Nursing note and vitals reviewed.      Significant Labs:   BMP:     Recent Labs  Lab 11/19/17 0518   *   *   K 4.8      CO2 23   BUN 15   CREATININE 0.7   CALCIUM 10.1   MG 1.9     CBC:     Recent Labs  Lab 11/18/17 0445 11/19/17 0518   WBC 4.57 3.81*   HGB 12.8* 12.5*   HCT 37.2* 36.5*   PLT 99* 90*     CMP:     Recent Labs  Lab 11/18/17 0445 11/19/17 0518   * 133*   K 5.2* 4.8    105   CO2 19* 23   GLU 86 116*   BUN 16 15   CREATININE 0.8 0.7   CALCIUM 10.3 10.1   PROT 7.0 6.4   ALBUMIN 2.2* 2.1*   BILITOT 0.6 0.6    ALKPHOS 156* 179*   * 119*   ALT 97* 109*   ANIONGAP 9 5*   EGFRNONAA >60 >60       Significant Imaging:   Imaging Results          CT Head Without Contrast (Final result)  Result time 11/16/17 16:42:12    Final result by Wilberto Dillard MD (11/16/17 16:42:12)                 Impression:         Chronic microvascular ischemic changes.     All CT scans at this facility use dose modulation, iterative reconstruction and/or weight based dosing when appropriate to reduce radiation dose to as low as reasonably achievable.        Electronically signed by: WILBERTO DILLARD MD  Date:     11/16/17  Time:    16:42              Narrative:    CT OF THE HEAD WITHOUT CONTRAST:     Technique: 5 mm axial images were obtained from the skullbase to the vertex, without administration of contrast.    Comparison: None.    History: r/o bleed    Findings:    No intracranial hemorrhage, extra-axial fluid collection, midline shift, or mass effect.  No evidence of an acute cortical infarct or of an infarct in a major vascular territory.    There is mild prominence of the ventricles and sulci compatible with diffuse cerebral volume loss.  Patchy hypoattenuation in the periventricular white matter regions is nonspecific, but most commonly seen with chronic microvascular ischemic changes. Vascular calcifications noted.    The calvarium is unremarkable. Visualized portions of the paranasal sinuses are clear. Visualized mastoid air cells are clear. Visualized orbits are unremarkable.                             FL Lumbar Puncture (xpd) (Final result)  Result time 11/16/17 11:03:14    Final result by Wilberto Dillard MD (11/16/17 11:03:14)                 Impression:     Technically successful fluoroscopic guided a lumbar puncture via a L2/3 translaminar approach. Approximately 11cc of clear cerebrospinal fluid was collected, placed in simple bottles, and sent to lab for analysis.       Electronically signed by: WILBERTO DILLARD MD  Date:      11/16/17  Time:    11:03              Narrative:    Procedure: Fluoroscopic guided lumbar puncture  Operators: Dimitrios.    History:  Confusion with HIV    Technique: Informed consent was obtained from the patient.  Following explanation of the risks, benefits and alternatives to the procedure including but not limited to bleeding, infection, nerve root/vascular injury, drug reaction, CSF leak/spinal headache, and herniation written consent was signed by the patient and placed in the patient's chart.     The patient was then placed on the fluoroscopic table in the prone position. On hand RN and RT assisted with patient positioning. The patient's back was then prepped and draped in sterile fashion. The L2/3 level was localized with fluoroscopic guidance. Using a 2 cc of a 1% lidocaine the overlying skin was anesthetized. Subsequently using a 20-gauge spinal needle and fluoroscopic guidance access was obtained into the thecal sac via a left L3/L4 translaminar approach. Following removal of the stylet clear cerebrospinal fluid was identified spontaneously. Opening pressure was 8 cm of water. Approximately 11 cc was collected, placed in sample bottles, and sent to the lab for analysis. The stylet was then reinserted and the spinal needle was removed in full.      The patient tolerated the procedure well. No immediate postprocedure complications. Adhesive bandage placed over the puncture site.    Fluoroscopic time was 0.6   and a single fluoroscopic images were obtained.     Order placed in chart for strict bed rest next 3 hours.                             CT Head Without Contrast (Final result)  Result time 11/12/17 18:19:51    Final result by Leonor Marques III, MD (11/12/17 18:19:51)                 Impression:       1.  No acute intracranial disease identified.    2.  Mild age related atrophy and minimal chronic microvascular ischemic change.        Electronically signed by: LEONOR MARQUES MD  Date:      11/12/17  Time:    18:19              Narrative:    Head CT without contrast    Clinical history: R41.82 Altered mental status, unspecified; generalized weakness    TECHNIQUE: Routine noncontrast axial head CT. All CT scans at this facility use dose modulation, iterative reconstruction, and/or weight based dosing when appropriate to reduce radiation dose to as low as reasonably achievable.    Comparison: none    FINDINGS: There is mild diffuse age-related atrophy.  Atherosclerotic calcifications of the intracranial arteries are noted. There is minimal periventricular white matter hypodensity, most consistent with chronic microvascular ischemic change.  There is no CT evidence of acute infarct, intracranial hemorrhage, mass-effect,  obstructive hydrocephalus or other acute disease. The visualized paranasal sinuses and mastoid air cells are clear. No calvarial fracture is identified.                             X-Ray Chest AP Portable (Final result)  Result time 11/12/17 16:46:20    Final result by Leonor Marques III, MD (11/12/17 16:46:20)                 Impression:     See above.        Electronically signed by: LEONOR MARQUES MD  Date:     11/12/17  Time:    16:46              Narrative:    XR CHEST AP PORTABLE    Clinical history: sepsis.      Findings: Cardiomediastinal silhouette is within normal limits for AP technique. There is mild elevation the right diaphragm was mild adjacent right infrahilar vascular crowding. There are symmetric band-shaped opacities in the bilateral medial upper lobes, possibly infiltrates or vascular congestion.  Opacity in the retrocardiac left lung base could be related to underpenetration or infiltrate.  The remainder the lungs appear clear of active disease.

## 2017-11-19 NOTE — ASSESSMENT & PLAN NOTE
CD4 count 71, viral load of 6.5 million on 8/10/2017.  Patient has been off HAART since 4/2017 due to noncompliance.  Follows with Dr. Pete with [facility] infectious [facility].  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

## 2017-11-19 NOTE — PLAN OF CARE
Problem: Patient Care Overview  Goal: Plan of Care Review  Outcome: Ongoing (interventions implemented as appropriate)  Patient stable. Plan of care reviewed. Patient verbalizes understanding. PCN G iv med infusing.Bed low, wheels locked, bed alarm on, call light in reach. Patient instructed to call for assistance. Will continue to monitor.

## 2017-11-19 NOTE — PROGRESS NOTES
[facility]  Infectious Disease  Progress Note    Patient Name: Jeffrey Rosales  MRN: [id number]  Admission Date: 11/12/2017  Length of Stay: 7 days  Attending Physician: Tmomy Myles MD  Primary Care Provider: Provider [facility]    Isolation Status: No active isolations  Assessment/Plan:      * PCP Pneumonia withy acute Hypoxemic Resp Failure     Will continue  Bactrim and steroid.        Thrombocytopenia associated with AIDS    Likely related to AIDs.  Will monitor closely         Encephalopathy acute    .  Will follow CSF results  for FELICITY virus and CSF VDRL.    Will continue  IV penicillin for presumed neurosyphillis until CSF VDRLis known         AIDS (acquired immunodeficiency syndrome), CD4 <=200    Latest cd4 count -08/2017 -247.  CD 4 count is 71-CD% -3.8.  Continue  zithromax 1200mg for MAC prophylaxis.  Continue bactrim for PCP.        Essential hypertension    BP control as per primary team            Anticipated Disposition:     Thank you for your consult. I will follow-up with patient. Please contact us if you have any additional questions.    Jeffrey López MD  Infectious Disease  [facility]    Subjective:     Principal Problem:Pneumonia due to infectious organism    HPI: Patient is poor historian due to somnolence.  History obtained from patient's daughter , ED staff, and past medical record in Care Everywhere.  66 year old  male  with history of of HIV/AIDS-cd4 count on 08/2017-247,cd4% 28 ,HIV viral load -6.4 million , HCV, HTN, HLD, DM II, DDD with chronic back pain, PUD, and h/o anal cancer, who presented to the ED with c/o generalized weakness, fatigue, and malaise that has progressively worsened over the past 2 weeks.  Associated subjective fevers, lethargy, and ~15-20 pound weight loss (unintentional) in past 3 months .  .  Patient's son also reports new abrasion to mid back that he noticed a few days ago with clear drainage.  Initial work-up in ED noted temp 100.6,  , RR 23, O2 sat 92% on RA.  CT head with no acute process.  CTA of the chest -11/01-  2.  1.6 cm right lower lobe pulmonary nodule.    3.  Scattered pulmonary emphysema changes.    From the last ID follow up note on 08/27-[facility] reported that he has not picked up antiretrovirals in months. Last refill date for Tivicay February 2017 and Truvada April 2017.  He follows up with [facility] oncology for anal cancer.  At that visit ,his HAART therapy was stopped.    Interval History:   66 year old man with AIDS-    CD4 COUNT - 71,CD%-3.8.  VIRAL load is 487,107  He has episodes of confusion.  He has past history of syphilis.    He was admitted with history of falls.  LP showed wbc 4 ,csf protein is 54.    He continues to have occasional  episode of confusion .  He remains afebrile.    Review of Systems   Constitutional: Positive for activity change, fatigue and unexpected weight change. Negative for appetite change, chills, diaphoresis and fever.   HENT: Negative for congestion, nosebleeds, sore throat and trouble swallowing.    Eyes: Negative for pain, discharge and visual disturbance.   Respiratory: Positive for shortness of breath. Negative for apnea, cough, chest tightness, wheezing and stridor.    Cardiovascular: Negative for chest pain, palpitations and leg swelling.   Gastrointestinal: Negative for abdominal distention, abdominal pain, blood in stool, constipation, diarrhea, nausea and vomiting.   Endocrine: Negative for cold intolerance and heat intolerance.   Genitourinary: Negative for difficulty urinating, dysuria, flank pain, frequency and urgency.   Musculoskeletal: Negative for arthralgias, back pain, joint swelling, myalgias, neck pain and neck stiffness.   Skin: Negative for rash and wound.   Allergic/Immunologic: Negative for food allergies and immunocompromised state.   Neurological: Positive for weakness (generalized ). Negative for dizziness, seizures, syncope, facial asymmetry, light-headedness and  headaches.        He has intermittent episodes of confusion and has history of falls.   Hematological: Negative for adenopathy.   Psychiatric/Behavioral: Positive for confusion. Negative for agitation and behavioral problems. The patient is not nervous/anxious.      Objective:     Vital Signs (Most Recent):  Temp: 97.8 °F (36.6 °C) (11/19/17 0417)  Pulse: (!) 111 (11/19/17 0417)  Resp: 18 (11/19/17 0417)  BP: (!) 175/98 (11/19/17 0417)  SpO2: 96 % (11/19/17 0417) Vital Signs (24h Range):  Temp:  [97.2 °F (36.2 °C)-98.8 °F (37.1 °C)] 97.8 °F (36.6 °C)  Pulse:  [100-120] 111  Resp:  [18-20] 18  SpO2:  [92 %-97 %] 96 %  BP: (132-175)/(65-98) 175/98     Weight: 90.8 kg (200 lb 2.8 oz)  Body mass index is 26.41 kg/m².    Estimated Creatinine Clearance: 101.3 mL/min (based on SCr of 0.8 mg/dL).    Physical Exam   Constitutional: He appears well-developed and well-nourished. He appears lethargic. He is sleeping. He is easily aroused. No distress.   HENT:   Head: Normocephalic and atraumatic.   Eyes: Conjunctivae and EOM are normal. Pupils are equal, round, and reactive to light.   Neck: Normal range of motion. Neck supple. No JVD present.   Cardiovascular: Regular rhythm, S1 normal, S2 normal and intact distal pulses.   No extrasystoles are present. Tachycardia present.  Exam reveals no gallop.    No murmur heard.  Pulses:       Radial pulses are 2+ on the right side, and 2+ on the left side.        Dorsalis pedis pulses are 2+ on the right side, and 2+ on the left side.        Posterior tibial pulses are 2+ on the right side, and 2+ on the left side.   Pulmonary/Chest: Effort normal and breath sounds normal. No accessory muscle usage. No tachypnea. No respiratory distress. He has no decreased breath sounds. He has no wheezes. He has no rales.   Abdominal: Soft. Bowel sounds are normal. He exhibits no distension. There is no tenderness. There is no rebound and no guarding.   Musculoskeletal: Normal range of motion. He  exhibits no edema, tenderness or deformity.   Neurological: He is easily aroused. He appears lethargic. No cranial nerve deficit or sensory deficit. GCS eye subscore is 3. GCS verbal subscore is 5. GCS motor subscore is 6.   Not oriented to place,has intermittent episodes of confusion.   Skin: Skin is warm and dry. Capillary refill takes less than 2 seconds. Abrasion (midback, no drainage appreciated) noted. No rash noted. He is not diaphoretic. No erythema.   Psychiatric: His speech is normal. Thought content normal. His mood appears not anxious. He is not actively hallucinating. Cognition and memory are normal. He does not exhibit a depressed mood. He is attentive.   Nursing note and vitals reviewed.      Significant Labs:   Blood Culture:     Recent Labs  Lab 11/12/17  1540 11/12/17  1555   LABBLOO No growth after 5 days. No growth after 5 days.     CBC:     Recent Labs  Lab 11/18/17  0445 11/19/17  0518   WBC 4.57 3.81*   HGB 12.8* 12.5*   HCT 37.2* 36.5*   PLT 99* 90*     HIV 1/2 Antibodies: No results for input(s): [id number] in the last 48 hours.  All pertinent labs within the past 24 hours have been reviewed.    Significant Imaging: I have reviewed all pertinent imaging results/findings within the past 24 hours.

## 2017-11-19 NOTE — PLAN OF CARE
Problem: Patient Care Overview  Goal: Plan of Care Review  Outcome: Ongoing (interventions implemented as appropriate)  Pt remained afebrile throughout this shift.   IV abx administered per order.   Pt remained free of falls this shift.   Plan of care reviewed. Patient verbalizes understanding.   Pt moving/turing independently.   Patient ST/SR on monitor.   Bed low, side rails up x 2, wheels locked, call light in reach.   Patient instructed to call for assistance.   Will continue to monitor.

## 2017-11-19 NOTE — ASSESSMENT & PLAN NOTE
.  Will follow CSF results  for FELICITY virus and CSF VDRL.    Will continue  IV penicillin for presumed neurosyphillis until CSF VDRLis known

## 2017-11-19 NOTE — ASSESSMENT & PLAN NOTE
Latest cd4 count -08/2017 -247.  CD 4 count is 71-CD% -3.8.  Continue  zithromax 1200mg for MAC prophylaxis.  Continue bactrim for PCP.

## 2017-11-19 NOTE — PROGRESS NOTES
[facility] Hospital Medicine  Progress Note    Patient Name: Jeffrey Rosales  MRN: [id number]  Patient Class: IP- Inpatient   Admission Date: 11/12/2017  Length of Stay: 7 days  Attending Physician: Tommy Myles MD  Primary Care Provider: Provider Notinsystem        Subjective:     Principal Problem:Pneumonia due to infectious organism    HPI:  Patient is poor historian due to somnolence.  History obtained from patient's son, ED staff, and past medical record in Care Everywhere.  Mr. Rosales is a 67 yo male  with a PMHx of HIV/AIDS, HCV, HTN, HLD, DM II, DDD with chronic back pain, PUD, and h/o anal cancer, who presented to the ED with c/o generalized weakness, fatigue, and malaise that has progressively worsened over the past 2 weeks.  Associated subjective fevers, lethargy, and ~15-20 pound weight loss (unintentional) in past 3 months .  Denies any chest pain, palpitations, SOB/STREET, cough, orthopnea, PND, edema, weight gain/loss, decreased appetite, ABD pain or distention, N/V/D, melena/rectal bleeding, dysuria, hematuria, lightheadedness/dizziness, HA, visual disturbance, focal deficit, syncope, diaphoresis, or chills.  Patient's son also reports new abrasion to mid back that he noticed a few days ago with clear drainage.  Initial work-up in ED noted temp 100.6, , RR 23, O2 sat 92% on RA.  ABG resulted pH 7.4, CO2 31, O2 52, HCO3 19.7.  UA unremarkable.  CT head with no acute process.  CXR with bilateral infiltrates consistent with PCP pneumonia.  Hospital Medicine was consulted for admission.  Currently, patient appears comfortable, in NAD.  He remains lethargic, but easily aroused.    Hospital Course:  Pt admitted with Acute hypoxic respiratory failure and PCP Pneumonia and started on IVF and IV Bactrim and IV steroids.  CD4 count is now 71, Viral load is 6.4 million. LDH elevated. There is documented noncompliance with HAART per Dr. Pete in care everywhere. Dr Beaver at [facility] stopped his  antiretrovirals during the last visit in August 2017.     Pt's confusion is worsening with hallucinations and strange behavior- but still often answering questions appropriately -Dr. lópez states behavior is concerning for FELICITY virus and Neuro syphillis. Pt reports memory problems and tremors x 2 weeks. LP cultures still pending.    Dr. López  also recommended Azithromycin ppx     Offered nursing home placement to assist with med compliance and for therapy but pt refused.  Discussed grave prognosis without compliance with medications- pt verbalized understanding and declined Hospice. He reported he will be compliant with HAART and antibiotics. Dr. López suspects HIV resistence to HAART- will refer pt back to his ID. With pt's permission I discussed plan of care and pt status with pt's daughter Sariah who verbalized understanding. Daughter and pt's ex wife request SNF - Initially, pt declined but now agrees.     Pt continues to hallucinate- strange behavior escalating - refuses clothes and is urinating on himself.Fatigue and malaise improved. Still very weak.   RPR Positive- Dr. López recommends empiric treatment with IV penicillin - can discharge to SNF on this and follow up with Dr. López for results  Pt denies complaints.        Review of Systems   Constitutional: Positive for activity change and unexpected weight change. Negative for appetite change, chills, diaphoresis, fatigue and fever.   HENT: Negative for congestion, nosebleeds, sore throat and trouble swallowing.    Eyes: Negative for pain, discharge and visual disturbance.   Respiratory: Negative for apnea, cough, chest tightness, shortness of breath, wheezing and stridor.    Cardiovascular: Negative for chest pain, palpitations and leg swelling.   Gastrointestinal: Negative for abdominal distention, abdominal pain, blood in stool, constipation, diarrhea, nausea and vomiting.   Endocrine: Negative for cold intolerance and heat intolerance.   Genitourinary:  Negative for difficulty urinating, dysuria, flank pain, frequency and urgency.   Musculoskeletal: Negative for arthralgias, back pain, joint swelling, myalgias, neck pain and neck stiffness.   Skin: Negative for rash and wound.   Allergic/Immunologic: Negative for food allergies and immunocompromised state.   Neurological: Positive for tremors. Negative for dizziness, seizures, syncope, facial asymmetry, weakness, light-headedness and headaches.   Hematological: Negative for adenopathy.   Psychiatric/Behavioral: Positive for hallucinations. Negative for agitation, behavioral problems and confusion. The patient is not nervous/anxious.      Objective:     Vital Signs (Most Recent):  Temp: 98.3 °F (36.8 °C) (11/19/17 1309)  Pulse: (!) 112 (11/19/17 1309)  Resp: 20 (11/19/17 1309)  BP: 120/69 (11/19/17 1309)  SpO2: (!) 90 % (11/19/17 1309) Vital Signs (24h Range):  Temp:  [97.2 °F (36.2 °C)-98.6 °F (37 °C)] 98.3 °F (36.8 °C)  Pulse:  [102-117] 112  Resp:  [18-20] 20  SpO2:  [90 %-97 %] 90 %  BP: (120-175)/(69-98) 120/69     Weight: 90.8 kg (200 lb 2.8 oz)  Body mass index is 26.41 kg/m².    Intake/Output Summary (Last 24 hours) at 11/19/17 1427  Last data filed at 11/19/17 0700   Gross per 24 hour   Intake                0 ml   Output             1350 ml   Net            -1350 ml      Physical Exam   Constitutional: He is oriented to person, place, and time. He appears well-developed and well-nourished. No distress.   HENT:   Head: Normocephalic and atraumatic.   Nose: Nose normal.   Mouth/Throat: Oropharynx is clear and moist.   Eyes: Conjunctivae and EOM are normal. No scleral icterus.   Neck: Normal range of motion. Neck supple.   Cardiovascular: Normal rate, regular rhythm, normal heart sounds and intact distal pulses.  Exam reveals no gallop and no friction rub.    No murmur heard.  Pulmonary/Chest: Effort normal. No stridor. No respiratory distress. He has no wheezes. He has no rales. He exhibits no tenderness.    Diminished bilaterally    Abdominal: Soft. Bowel sounds are normal. He exhibits no distension. There is no tenderness. There is no rebound and no guarding.   Musculoskeletal: Normal range of motion. He exhibits no edema, tenderness or deformity.   Neurological: He is alert and oriented to person, place, and time. He has normal reflexes. No cranial nerve deficit. He exhibits normal muscle tone. Coordination abnormal.   Disoriented to place and situation at times   +tremors- worsening    Skin: Skin is warm and dry. No rash noted. He is not diaphoretic. No erythema. No pallor.   Psychiatric: He has a normal mood and affect. He expresses impulsivity.   Inappropriate behavior    Nursing note and vitals reviewed.      Significant Labs:   BMP:     Recent Labs  Lab 11/19/17 0518   *   *   K 4.8      CO2 23   BUN 15   CREATININE 0.7   CALCIUM 10.1   MG 1.9     CBC:     Recent Labs  Lab 11/18/17 0445 11/19/17 0518   WBC 4.57 3.81*   HGB 12.8* 12.5*   HCT 37.2* 36.5*   PLT 99* 90*     CMP:     Recent Labs  Lab 11/18/17 0445 11/19/17 0518   * 133*   K 5.2* 4.8    105   CO2 19* 23   GLU 86 116*   BUN 16 15   CREATININE 0.8 0.7   CALCIUM 10.3 10.1   PROT 7.0 6.4   ALBUMIN 2.2* 2.1*   BILITOT 0.6 0.6   ALKPHOS 156* 179*   * 119*   ALT 97* 109*   ANIONGAP 9 5*   EGFRNONAA >60 >60       Significant Imaging:   Imaging Results          CT Head Without Contrast (Final result)  Result time 11/16/17 16:42:12    Final result by Wilberto Dillard MD (11/16/17 16:42:12)                 Impression:         Chronic microvascular ischemic changes.     All CT scans at this facility use dose modulation, iterative reconstruction and/or weight based dosing when appropriate to reduce radiation dose to as low as reasonably achievable.        Electronically signed by: WILBERTO DILLARD MD  Date:     11/16/17  Time:    16:42              Narrative:    CT OF THE HEAD WITHOUT CONTRAST:     Technique: 5 mm axial  images were obtained from the skullbase to the vertex, without administration of contrast.    Comparison: None.    History: r/o bleed    Findings:    No intracranial hemorrhage, extra-axial fluid collection, midline shift, or mass effect.  No evidence of an acute cortical infarct or of an infarct in a major vascular territory.    There is mild prominence of the ventricles and sulci compatible with diffuse cerebral volume loss.  Patchy hypoattenuation in the periventricular white matter regions is nonspecific, but most commonly seen with chronic microvascular ischemic changes. Vascular calcifications noted.    The calvarium is unremarkable. Visualized portions of the paranasal sinuses are clear. Visualized mastoid air cells are clear. Visualized orbits are unremarkable.                             FL Lumbar Puncture (xpd) (Final result)  Result time 11/16/17 11:03:14    Final result by Wilberto Dillard MD (11/16/17 11:03:14)                 Impression:     Technically successful fluoroscopic guided a lumbar puncture via a L2/3 translaminar approach. Approximately 11cc of clear cerebrospinal fluid was collected, placed in simple bottles, and sent to lab for analysis.       Electronically signed by: WILBERTO DILLARD MD  Date:     11/16/17  Time:    11:03              Narrative:    Procedure: Fluoroscopic guided lumbar puncture  Operators: Dimitrios.    History:  Confusion with HIV    Technique: Informed consent was obtained from the patient.  Following explanation of the risks, benefits and alternatives to the procedure including but not limited to bleeding, infection, nerve root/vascular injury, drug reaction, CSF leak/spinal headache, and herniation written consent was signed by the patient and placed in the patient's chart.     The patient was then placed on the fluoroscopic table in the prone position. On hand RN and RT assisted with patient positioning. The patient's back was then prepped and draped in sterile fashion.  The L2/3 level was localized with fluoroscopic guidance. Using a 2 cc of a 1% lidocaine the overlying skin was anesthetized. Subsequently using a 20-gauge spinal needle and fluoroscopic guidance access was obtained into the thecal sac via a left L3/L4 translaminar approach. Following removal of the stylet clear cerebrospinal fluid was identified spontaneously. Opening pressure was 8 cm of water. Approximately 11 cc was collected, placed in sample bottles, and sent to the lab for analysis. The stylet was then reinserted and the spinal needle was removed in full.      The patient tolerated the procedure well. No immediate postprocedure complications. Adhesive bandage placed over the puncture site.    Fluoroscopic time was 0.6   and a single fluoroscopic images were obtained.     Order placed in chart for strict bed rest next 3 hours.                             CT Head Without Contrast (Final result)  Result time 11/12/17 18:19:51    Final result by Leonor Marques III, MD (11/12/17 18:19:51)                 Impression:       1.  No acute intracranial disease identified.    2.  Mild age related atrophy and minimal chronic microvascular ischemic change.        Electronically signed by: LEONOR MARQUES MD  Date:     11/12/17  Time:    18:19              Narrative:    Head CT without contrast    Clinical history: R41.82 Altered mental status, unspecified; generalized weakness    TECHNIQUE: Routine noncontrast axial head CT. All CT scans at this facility use dose modulation, iterative reconstruction, and/or weight based dosing when appropriate to reduce radiation dose to as low as reasonably achievable.    Comparison: none    FINDINGS: There is mild diffuse age-related atrophy.  Atherosclerotic calcifications of the intracranial arteries are noted. There is minimal periventricular white matter hypodensity, most consistent with chronic microvascular ischemic change.  There is no CT evidence of acute infarct, intracranial  hemorrhage, mass-effect,  obstructive hydrocephalus or other acute disease. The visualized paranasal sinuses and mastoid air cells are clear. No calvarial fracture is identified.                             X-Ray Chest AP Portable (Final result)  Result time 11/12/17 16:46:20    Final result by Leonor Marques III, MD (11/12/17 16:46:20)                 Impression:     See above.        Electronically signed by: LEONOR MARQUES MD  Date:     11/12/17  Time:    16:46              Narrative:    XR CHEST AP PORTABLE    Clinical history: sepsis.      Findings: Cardiomediastinal silhouette is within normal limits for AP technique. There is mild elevation the right diaphragm was mild adjacent right infrahilar vascular crowding. There are symmetric band-shaped opacities in the bilateral medial upper lobes, possibly infiltrates or vascular congestion.  Opacity in the retrocardiac left lung base could be related to underpenetration or infiltrate.  The remainder the lungs appear clear of active disease.                            Assessment/Plan:      * PCP Pneumonia withy acute Hypoxemic Resp Failure     cont po Bactrim and prednisone   Duonebs.  Blood cultures show  NGTD  Hypoxia resolved        Encephalopathy acute    Hallucinations and inappropriate behavior worsening- concerning for neurosyphilis and PML s/p LP -CSF results  for FELICITY virus and CSF VDRL pending   +RPR  Dr. López recommended treatment for neurosyphilis with IV PCN until results are known - may be discharged on IV PCN and follow up with ID           AIDS (acquired immunodeficiency syndrome), CD4 <=200    CD4 count 71, viral load of 6.5 million on 8/10/2017.  Patient has been off HAART since 4/2017 due to noncompliance.  Follows with Dr. Pete with [facility] infectious disease.  Care discussed with Infectious disease  Start Azithromycin ppx   Pt requests to be re-started on HAART  Pt underwent LP for Neuro syphilis and FELICITY virus - cultures pending - on IV PCN           Thrombocytopenia associated with AIDS    Monitor           Pressure ulcer of coccygeal region, stage 3    Wound care           Generalized weakness    PT/OT  Family requests SNF            Chronic hepatitis C    LFTs mildly increased- monitor           Essential hypertension    BP controlled currently.  Continue home amlodipine.            VTE Risk Mitigation         Ordered     Medium Risk of VTE  Once      11/12/17 2054     Place sequential compression device  Until discontinued      11/12/17 2054              Lindsey Navarro NP  Department of Hospital Medicine   [facility]

## 2017-11-19 NOTE — PROGRESS NOTES
SW f/u with patient with wife at the bedside.  Wife stated that patient's choice for SNF would be [facility].  SW requested additional choice in the event that [facility] does not have a bed.  Wife tried calling daughter but received no answer.  Wife will f/u tomorrow after obtaining additional choices from patient's daughter.  Daysi Rodriguez LMSw, DARIELA-Anshul, [facility]  11/19/2017

## 2017-11-19 NOTE — ASSESSMENT & PLAN NOTE
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 López recommended treatment for neurosyphilis with IV PCN until results are known - may be discharged on IV PCN and follow up with ID

## 2017-11-20 NOTE — PROGRESS NOTES
[facility]  Infectious Disease  Progress Note    Patient Name: Jeffrey Rosales  MRN: [id number]  Admission Date: 11/12/2017  Length of Stay: 8 days  Attending Physician: Tommy Myles MD  Primary Care Provider: Provider Notinsystem    Isolation Status: No active isolations  Assessment/Plan:      * PCP Pneumonia withy acute Hypoxemic Resp Failure     Will continue  Bactrim and steroid for presumed PCP        Encephalopathy acute    .   CSF results  for FELICITY virus is negative .    CSF VDRL is still pending     Will continue  IV penicillin for presumed neurosyphillis until CSF VDRL is known        Chronic hepatitis C    Needs out patient follow up for definitive therapy .        AIDS (acquired immunodeficiency syndrome), CD4 <=200    Latest cd4 count -08/2017 -247.  CD 4 count is 71-CD% -3.8.  Will need to follow up in clinic with his HIV provider to restart HAART.  Continue Zithromax and Bactrim for OI prophylaxis.            Anticipated Disposition:     Thank you for your consult. I will follow-up with patient. Please contact us if you have any additional questions.    Jeffrey López MD  Infectious Disease  [facility]    Subjective:     Principal Problem:Pneumonia due to infectious organism    HPI: Patient is poor historian due to somnolence.  History obtained from patient's daughter , ED staff, and past medical record in Care Everywhere.  66 year old  male  with history of of HIV/AIDS-cd4 count on 08/2017-247,cd4% 28 ,HIV viral load -6.4 million , HCV, HTN, HLD, DM II, DDD with chronic back pain, PUD, and h/o anal cancer, who presented to the ED with c/o generalized weakness, fatigue, and malaise that has progressively worsened over the past 2 weeks.  Associated subjective fevers, lethargy, and ~15-20 pound weight loss (unintentional) in past 3 months .  .  Patient's son also reports new abrasion to mid back that he noticed a few days ago with clear drainage.  Initial work-up in ED noted  temp 100.6, , RR 23, O2 sat 92% on RA.  CT head with no acute process.  CTA of the chest -11/01-  2.  1.6 cm right lower lobe pulmonary nodule.    3.  Scattered pulmonary emphysema changes.    From the last ID follow up note on 08/27-[facility] reported that he has not picked up antiretrovirals in months. Last refill date for Tivicay February 2017 and Truvada April 2017.  He follows up with [facility] oncology for anal cancer.  At that visit ,his HAART therapy was stopped.    Interval History:   66 year old man with AIDS-    CD4 COUNT - 71,CD%-3.8.  VIRAL load is 487,107  He has episodes of confusion.  He has past history of syphilis.    He was admitted with history of falls.  LP showed wbc 4 ,csf protein is 54.    He continues to have occasional  episode of confusion .  He remains afebrile.  CSF FELICITY  Virus-negative ,CSF VDRL-pending     Review of Systems   Constitutional: Positive for activity change, fatigue and unexpected weight change. Negative for appetite change, chills, diaphoresis and fever.   HENT: Negative for congestion, nosebleeds, sore throat and trouble swallowing.    Eyes: Negative for pain, discharge and visual disturbance.   Respiratory: Positive for shortness of breath. Negative for apnea, cough, chest tightness, wheezing and stridor.    Cardiovascular: Negative for chest pain, palpitations and leg swelling.   Gastrointestinal: Negative for abdominal distention, abdominal pain, blood in stool, constipation, diarrhea, nausea and vomiting.   Endocrine: Negative for cold intolerance and heat intolerance.   Genitourinary: Negative for difficulty urinating, dysuria, flank pain, frequency and urgency.   Musculoskeletal: Negative for arthralgias, back pain, joint swelling, myalgias, neck pain and neck stiffness.   Skin: Negative for rash and wound.   Allergic/Immunologic: Negative for food allergies and immunocompromised state.   Neurological: Positive for weakness (generalized ). Negative for dizziness, seizures,  syncope, facial asymmetry, light-headedness and headaches.        He has intermittent episodes of confusion and has history of falls.   Hematological: Negative for adenopathy.   Psychiatric/Behavioral: Positive for confusion. Negative for agitation and behavioral problems. The patient is not nervous/anxious.      Objective:     Vital Signs (Most Recent):  Temp: 97.7 °F (36.5 °C) (11/20/17 0452)  Pulse: (!) 112 (11/20/17 0452)  Resp: 18 (11/20/17 0452)  BP: 125/77 (11/20/17 0452)  SpO2: 95 % (11/20/17 0452) Vital Signs (24h Range):  Temp:  [97.2 °F (36.2 °C)-98.9 °F (37.2 °C)] 97.7 °F (36.5 °C)  Pulse:  [] 112  Resp:  [18-20] 18  SpO2:  [90 %-98 %] 95 %  BP: (116-132)/(62-77) 125/77     Weight: 90.8 kg (200 lb 2.8 oz)  Body mass index is 26.41 kg/m².    Estimated Creatinine Clearance: 115.7 mL/min (based on SCr of 0.7 mg/dL).    Physical Exam   Constitutional: He appears well-developed and well-nourished. He appears lethargic. He is sleeping. He is easily aroused. No distress.   HENT:   Head: Normocephalic and atraumatic.   Eyes: Conjunctivae and EOM are normal. Pupils are equal, round, and reactive to light.   Neck: Normal range of motion. Neck supple. No JVD present.   Cardiovascular: Regular rhythm, S1 normal, S2 normal and intact distal pulses.   No extrasystoles are present. Tachycardia present.  Exam reveals no gallop.    No murmur heard.  Pulses:       Radial pulses are 2+ on the right side, and 2+ on the left side.        Dorsalis pedis pulses are 2+ on the right side, and 2+ on the left side.        Posterior tibial pulses are 2+ on the right side, and 2+ on the left side.   Pulmonary/Chest: Effort normal and breath sounds normal. No accessory muscle usage. No tachypnea. No respiratory distress. He has no decreased breath sounds. He has no wheezes. He has no rales.   Abdominal: Soft. Bowel sounds are normal. He exhibits no distension. There is no tenderness. There is no rebound and no guarding.    Musculoskeletal: Normal range of motion. He exhibits no edema, tenderness or deformity.   Neurological: He is easily aroused. He appears lethargic. No cranial nerve deficit or sensory deficit. GCS eye subscore is 3. GCS verbal subscore is 5. GCS motor subscore is 6.   Not oriented to place,has intermittent episodes of confusion.   Skin: Skin is warm and dry. Capillary refill takes less than 2 seconds. Abrasion (midback, no drainage appreciated) noted. No rash noted. He is not diaphoretic. No erythema.   Psychiatric: His speech is normal. Thought content normal. His mood appears not anxious. He is not actively hallucinating. Cognition and memory are normal. He does not exhibit a depressed mood. He is attentive.   Nursing note and vitals reviewed.      Significant Labs:   Blood Culture:     Recent Labs  Lab 11/12/17  1540 11/12/17  1555   LABBLOO No growth after 5 days. No growth after 5 days.     CBC:     Recent Labs  Lab 11/19/17  0518 11/20/17  0516   WBC 3.81* 4.10   HGB 12.5* 12.5*   HCT 36.5* 37.1*   PLT 90* 97*     HIV 1/2 Antibodies: No results for input(s): [id number] in the last 48 hours.  All pertinent labs within the past 24 hours have been reviewed.    Significant Imaging: I have reviewed all pertinent imaging results/findings within the past 24 hours.

## 2017-11-20 NOTE — PLAN OF CARE
Problem: Patient Care Overview  Goal: Plan of Care Review  Outcome: Ongoing (interventions implemented as appropriate)  NSR on monitor on 3L NC. Continuous IV abx infusing; family at bedside. Fall precautions maintained. Bed alarm refused. BG monitored. Duoderm to sacrum. Pt. turning independently in bed. Pt. oriented x4. Will continue to monitor.

## 2017-11-20 NOTE — PLAN OF CARE
LAILA contacted Jade at [place] @[phone].  She is unable to access right care and requested that records be faxed to [phone].  CM faxed records as requested.

## 2017-11-20 NOTE — SUBJECTIVE & OBJECTIVE
Interval History:   66 year old man with AIDS-    CD4 COUNT - 71,CD%-3.8.  VIRAL load is 487,107  He has episodes of confusion.  He has past history of syphilis.    He was admitted with history of falls.  LP showed wbc 4 ,csf protein is 54.    He continues to have occasional  episode of confusion .  He remains afebrile.  CSF FELICITY  Virus-negative ,CSF VDRL-pending     Review of Systems   Constitutional: Positive for activity change, fatigue and unexpected weight change. Negative for appetite change, chills, diaphoresis and fever.   HENT: Negative for congestion, nosebleeds, sore throat and trouble swallowing.    Eyes: Negative for pain, discharge and visual disturbance.   Respiratory: Positive for shortness of breath. Negative for apnea, cough, chest tightness, wheezing and stridor.    Cardiovascular: Negative for chest pain, palpitations and leg swelling.   Gastrointestinal: Negative for abdominal distention, abdominal pain, blood in stool, constipation, diarrhea, nausea and vomiting.   Endocrine: Negative for cold intolerance and heat intolerance.   Genitourinary: Negative for difficulty urinating, dysuria, flank pain, frequency and urgency.   Musculoskeletal: Negative for arthralgias, back pain, joint swelling, myalgias, neck pain and neck stiffness.   Skin: Negative for rash and wound.   Allergic/Immunologic: Negative for food allergies and immunocompromised state.   Neurological: Positive for weakness (generalized ). Negative for dizziness, seizures, syncope, facial asymmetry, light-headedness and headaches.        He has intermittent episodes of confusion and has history of falls.   Hematological: Negative for adenopathy.   Psychiatric/Behavioral: Positive for confusion. Negative for agitation and behavioral problems. The patient is not nervous/anxious.      Objective:     Vital Signs (Most Recent):  Temp: 97.7 °F (36.5 °C) (11/20/17 0452)  Pulse: (!) 112 (11/20/17 0452)  Resp: 18 (11/20/17 0452)  BP: 125/77  (11/20/17 0452)  SpO2: 95 % (11/20/17 0452) Vital Signs (24h Range):  Temp:  [97.2 °F (36.2 °C)-98.9 °F (37.2 °C)] 97.7 °F (36.5 °C)  Pulse:  [] 112  Resp:  [18-20] 18  SpO2:  [90 %-98 %] 95 %  BP: (116-132)/(62-77) 125/77     Weight: 90.8 kg (200 lb 2.8 oz)  Body mass index is 26.41 kg/m².    Estimated Creatinine Clearance: 115.7 mL/min (based on SCr of 0.7 mg/dL).    Physical Exam   Constitutional: He appears well-developed and well-nourished. He appears lethargic. He is sleeping. He is easily aroused. No distress.   HENT:   Head: Normocephalic and atraumatic.   Eyes: Conjunctivae and EOM are normal. Pupils are equal, round, and reactive to light.   Neck: Normal range of motion. Neck supple. No JVD present.   Cardiovascular: Regular rhythm, S1 normal, S2 normal and intact distal pulses.   No extrasystoles are present. Tachycardia present.  Exam reveals no gallop.    No murmur heard.  Pulses:       Radial pulses are 2+ on the right side, and 2+ on the left side.        Dorsalis pedis pulses are 2+ on the right side, and 2+ on the left side.        Posterior tibial pulses are 2+ on the right side, and 2+ on the left side.   Pulmonary/Chest: Effort normal and breath sounds normal. No accessory muscle usage. No tachypnea. No respiratory distress. He has no decreased breath sounds. He has no wheezes. He has no rales.   Abdominal: Soft. Bowel sounds are normal. He exhibits no distension. There is no tenderness. There is no rebound and no guarding.   Musculoskeletal: Normal range of motion. He exhibits no edema, tenderness or deformity.   Neurological: He is easily aroused. He appears lethargic. No cranial nerve deficit or sensory deficit. GCS eye subscore is 3. GCS verbal subscore is 5. GCS motor subscore is 6.   Not oriented to place,has intermittent episodes of confusion.   Skin: Skin is warm and dry. Capillary refill takes less than 2 seconds. Abrasion (midback, no drainage appreciated) noted. No rash noted. He  is not diaphoretic. No erythema.   Psychiatric: His speech is normal. Thought content normal. His mood appears not anxious. He is not actively hallucinating. Cognition and memory are normal. He does not exhibit a depressed mood. He is attentive.   Nursing note and vitals reviewed.      Significant Labs:   Blood Culture:     Recent Labs  Lab 11/12/17  1540 11/12/17  1555   LABBLOO No growth after 5 days. No growth after 5 days.     CBC:     Recent Labs  Lab 11/19/17  0518 11/20/17  0516   WBC 3.81* 4.10   HGB 12.5* 12.5*   HCT 36.5* 37.1*   PLT 90* 97*     HIV 1/2 Antibodies: No results for input(s): [id number] in the last 48 hours.  All pertinent labs within the past 24 hours have been reviewed.    Significant Imaging: I have reviewed all pertinent imaging results/findings within the past 24 hours.

## 2017-11-20 NOTE — PROGRESS NOTES
Follow up visit with Mr. Rosales for present on admission stage 3 pressure injury to coccyx and blister to back. Patient reports blister is a burn from a heating pad.   Patient found sitting up in chair with female family member present at bedside.   Mepilex noted to open blister on back, removed. Open blister with moist pink wound bed measuring again noted.  Cleansed with sterile normal saline and patted dry. Intact andreas wound skin painted with cavilon. New mepilex applied to cover, drainage decreased from last assessment, healing.    Duoderm rolling, and removed from coccyx to reveal healing stage 3 pressure injury present on admission with moist pink and yellow wound bed. Cleansed with easi cleanse foam wipes and patted dry. Andreas wound skin intact, no longer macerated. Intact andreas wound skin painted with cavilon, and new duoderm applied to cover.

## 2017-11-20 NOTE — PT/OT/SLP PROGRESS
Occupational Therapy      Jeffrey Rosales  MRN: [id number]    S: pt cooperative with therapy session   O: pt performed 2 set x 10 reps b ue rom exercise with 1.5 dowel in all available planes and ranges supine in bed with hob elevated  A: pt displays improvements with bue strength and endurance as evidence by completing hep  P: continue with POC  Suzie Chaparro, OT   [phone]  1 rose  11/20/2017

## 2017-11-20 NOTE — ASSESSMENT & PLAN NOTE
.   CSF results  for FELICITY virus is negative .    CSF VDRL is still pending     Will continue  IV penicillin for presumed neurosyphillis until CSF VDRL is known

## 2017-11-20 NOTE — PT/OT/SLP PROGRESS
Physical Therapy  Treatment    Jeffrey Rosales   MRN: [id number]   Admitting Diagnosis: Pneumonia due to infectious organism    PT Received On: 11/20/17  PT Start Time: 0930     PT Stop Time: 0955    PT Total Time (min): 25 min       Billable Minutes:  Gait [id number] and Therapeutic Exercise 10    Treatment Type: Treatment  PT/PTA: PT       General Precautions: Standard, fall  Orthopedic Precautions: N/A   Braces: N/A    Subjective:  Communicated with NURSE CAILIN prior to session.  Pain/Comfort  Pain Rating 1: 0/10    Objective:   Patient found with: bed alarm, telemetry, peripheral IV    Functional Mobility:  Bed Mobility:   Rolling/Turning to Left: Stand by assistance  Rolling/Turning Right: Stand by assistance  Scooting/Bridging: Stand by Assistance  Supine to Sit: Stand by Assistance    Transfers:  Sit <> Stand Assistance: Minimum Assistance  Sit <> Stand Assistive Device: Rolling Walker  Bed <> Chair Technique: Stand Pivot  Bed <> Chair Assistance: Minimum Assistance  Bed <> Chair Assistive Device: Rolling Walker    Gait:   Gait Distance: PT AMB 35' X 2 TRIALS WITH RW AND MARCELA, SLOW PACED GAIT, FLEXED FORWARD, CUES FOR UPRIGHT POSTURE, CHAIR IN TOW FOR SAFETY, 1 SMALL SEATED RES  Assistance 1: Minimum assistance  Gait Assistive Device: Rolling walker  Gait Pattern: swing-through gait  Gait Deviation(s): decreased phillip, decreased step length    Balance:   Static Sit: FAIR  Dynamic Sit: POOR+  Static Stand: POOR+  Dynamic stand:  POOR+    Therapeutic Activities and Exercises:  PT SLOW MOVING WITH ALL FUNCTIONAL MOBILITY, CUES TO STAY ON TASK, PT EDUCATED IN AND PERFORMED BLE THEREX X 20 REPS AROM    AM-PAC 6 CLICK MOBILITY  How much help from another person does this patient currently need?   1 = Unable, Total/Dependent Assistance  2 = A lot, Maximum/Moderate Assistance  3 = A little, Minimum/Contact Guard/Supervision  4 = None, Modified [place]/Independent    Turning over in bed (including adjusting  bedclothes, sheets and blankets)?: 3  Sitting down on and standing up from a chair with arms (e.g., wheelchair, bedside commode, etc.): 3  Moving from lying on back to sitting on the side of the bed?: 3  Moving to and from a bed to a chair (including a wheelchair)?: 3  Need to walk in hospital room?: 3  Climbing 3-5 steps with a railing?: 1  Total Score: 16    AM-PAC Raw Score CMS G-Code Modifier Level of Impairment Assistance   6 % Total / Unable   7 - 9 CM 80 - 100% Maximal Assist   10 - 14 CL 60 - 80% Moderate Assist   15 - 19 CK 40 - 60% Moderate Assist   20 - 22 CJ 20 - 40% Minimal Assist   23 CI 1-20% SBA / CGA   24 CH 0% Independent/ Mod I     Patient left up in chair with all lines intact, call button in reach, NURSE notified and FAMILY present.    Assessment:  Jeffrey Rosales is a 66 y.o. male with a medical diagnosis of Pneumonia due to infectious organism   PT WILL BENEFIT FROM CONT. SKILLED P.T. TO ADDRESS IMPAIRMENTS    Rehab identified problem list/impairments: Rehab identified problem list/impairments: impaired endurance, impaired functional mobilty, weakness, impaired balance, decreased coordination, decreased lower extremity function, decreased safety awareness    Rehab potential is fair.    Activity tolerance: Fair    Discharge recommendations: Discharge Facility/Level Of Care Needs: nursing facility, skilled     Barriers to discharge: Barriers to Discharge: Inaccessible home environment, Decreased caregiver support    Equipment recommendations: Equipment Needed After Discharge: walker, rolling, bath bench     GOALS:    Physical Therapy Goals        Problem: Physical Therapy Goal    Goal Priority Disciplines Outcome Goal Variances Interventions   Physical Therapy Goal     PT/OT, PT      Description:  LTG'S TO BE MET IN 7 DAYS (11-22-17)  1. PT WILL REQUIRE CGA FOR BED MOBILITY  2. PT WILL REQUIRE CGA FOR TF'S  3. PT WILL ' WITH RW AND MARCELA  4. PT WILL TOLERATE BLE THEREX X 20 REPS  AROM  5. PT WILL DEMO F- DYNAMIC BALANCE DURING GAIT                     PLAN:    Patient to be seen 5 x/week  to address the above listed problems via gait training, therapeutic activities, therapeutic exercises  Plan of Care expires: 11/22/17  Plan of Care reviewed with: patient, spouse    PT ENCOURAGED TO CALL FOR ASSISTANCE WITH ALL NEEDS DUE TO FALL RISK STATUS, PT AGREEABLE.  PT ENCOURAGED TO INCREASE TIME OOB IN CHAIR, ALL MEALS IN CHAIR OOB, PT AGREEABLE    Ashanti Joaquin, PT  11/20/2017

## 2017-11-20 NOTE — ASSESSMENT & PLAN NOTE
Latest cd4 count -08/2017 -247.  CD 4 count is 71-CD% -3.8.  Will need to follow up in clinic with his HIV provider to restart HAART.  Continue Zithromax and Bactrim for OI prophylaxis.

## 2017-11-20 NOTE — PLAN OF CARE
Problem: Patient Care Overview  Goal: Plan of Care Review  Outcome: Ongoing (interventions implemented as appropriate)  Pt on ra and no distress noted

## 2017-11-20 NOTE — PLAN OF CARE
Problem: Patient Care Overview  Goal: Plan of Care Review  Outcome: Ongoing (interventions implemented as appropriate)  IMPROVED FUNCTIONAL MOBILITY, SBA FOR BED MOBILITY, MARCELA FOR TF'S AND GAIT WITH RW

## 2017-11-21 NOTE — SUBJECTIVE & OBJECTIVE
Interval History: CSF VDRL pending. [facility] [phone] states VDRL should result 11/21/17.      Review of Systems   Constitutional: Positive for activity change and unexpected weight change. Negative for appetite change, chills, diaphoresis, fatigue and fever.   HENT: Negative for congestion, nosebleeds, sore throat and trouble swallowing.    Eyes: Negative for pain, discharge and visual disturbance.   Respiratory: Negative for apnea, cough, chest tightness, shortness of breath, wheezing and stridor.    Cardiovascular: Negative for chest pain, palpitations and leg swelling.   Gastrointestinal: Negative for abdominal distention, abdominal pain, blood in stool, constipation, diarrhea, nausea and vomiting.   Endocrine: Negative for cold intolerance and heat intolerance.   Genitourinary: Negative for difficulty urinating, dysuria, flank pain, frequency and urgency.   Musculoskeletal: Negative for arthralgias, back pain, joint swelling, myalgias, neck pain and neck stiffness.   Skin: Negative for rash and wound.   Allergic/Immunologic: Negative for food allergies and immunocompromised state.   Neurological: Positive for tremors. Negative for dizziness, seizures, syncope, facial asymmetry, weakness, light-headedness and headaches.   Hematological: Negative for adenopathy.   Psychiatric/Behavioral: Positive for confusion and hallucinations. Negative for agitation and behavioral problems. The patient is not nervous/anxious.      Objective:     Vital Signs (Most Recent):  Temp: 98.1 °F (36.7 °C) (11/20/17 1600)  Pulse: (!) 113 (11/20/17 1754)  Resp: 18 (11/20/17 1600)  BP: 128/80 (11/20/17 1600)  SpO2: 98 % (11/20/17 1600) Vital Signs (24h Range):  Temp:  [97.2 °F (36.2 °C)-98.3 °F (36.8 °C)] 98.1 °F (36.7 °C)  Pulse:  [] 113  Resp:  [18-20] 18  SpO2:  [94 %-98 %] 98 %  BP: (116-143)/(58-82) 128/80     Weight: 90.8 kg (200 lb 2.8 oz)  Body mass index is 26.41 kg/m².    Intake/Output Summary (Last 24  hours) at 11/20/17 1836  Last data filed at 11/20/17 1557   Gross per 24 hour   Intake              600 ml   Output              350 ml   Net              250 ml      Physical Exam   Constitutional: He appears well-developed and well-nourished.   HENT:   Head: Normocephalic and atraumatic.   Nose: Nose normal.   Eyes: Conjunctivae and EOM are normal. Pupils are equal, round, and reactive to light.   Neck: Normal range of motion. Neck supple. No JVD present. No tracheal deviation present. No thyromegaly present.   Cardiovascular: Normal rate, regular rhythm, normal heart sounds and intact distal pulses.  Exam reveals no gallop and no friction rub.    No murmur heard.  Pulmonary/Chest: Effort normal. No stridor. No respiratory distress. He has no wheezes. He has no rales. He exhibits no tenderness.   Abdominal: Soft. Bowel sounds are normal. He exhibits no distension. There is no tenderness. There is no rebound and no guarding.   Musculoskeletal: Normal range of motion. He exhibits no edema, tenderness or deformity.   Neurological: He has normal reflexes. No cranial nerve deficit. Coordination normal.   Skin: Skin is warm and dry. No rash noted. No erythema. No pallor.   Tatoos. Stage 3 pressure ulcer to coccyx, burn to mid back   Psychiatric:   Confused, disoriented   Nursing note and vitals reviewed.    Significant Labs:   CBC:   Recent Labs  Lab 11/19/17 0518 11/20/17  0516   WBC 3.81* 4.10   HGB 12.5* 12.5*   HCT 36.5* 37.1*   PLT 90* 97*     CMP:   Recent Labs  Lab 11/19/17 0518 11/20/17  0516   * 132*   K 4.8 4.7    103   CO2 23 22*   * 261*   BUN 15 17   CREATININE 0.7 0.8   CALCIUM 10.1 10.4   PROT 6.4 6.5   ALBUMIN 2.1* 2.0*   BILITOT 0.6 0.6   ALKPHOS 179* 205*   * 86*   * 106*   ANIONGAP 5* 7*   EGFRNONAA >60 >60       Significant Imaging: I have reviewed all pertinent imaging results/findings within the past 24 hours.

## 2017-11-21 NOTE — SUBJECTIVE & OBJECTIVE
Interval History:   66 year old man with AIDS-    CD4 COUNT - 71,CD%-3.8.  VIRAL load is 487,107  He has episodes of confusion.  He has past history of syphilis.    He was admitted with history of falls.  LP showed wbc 4 ,csf protein is 54.    CSF FELICITY  Virus-negative ,CSF VDRL-pending     Review of Systems   Constitutional: Positive for activity change, fatigue and unexpected weight change. Negative for appetite change, chills, diaphoresis and fever.   HENT: Negative for congestion, nosebleeds, sore throat and trouble swallowing.    Eyes: Negative for pain, discharge and visual disturbance.   Respiratory: Positive for shortness of breath. Negative for apnea, cough, chest tightness, wheezing and stridor.    Cardiovascular: Negative for chest pain, palpitations and leg swelling.   Gastrointestinal: Negative for abdominal distention, abdominal pain, blood in stool, constipation, diarrhea, nausea and vomiting.   Endocrine: Negative for cold intolerance and heat intolerance.   Genitourinary: Negative for difficulty urinating, dysuria, flank pain, frequency and urgency.   Musculoskeletal: Negative for arthralgias, back pain, joint swelling, myalgias, neck pain and neck stiffness.   Skin: Negative for rash and wound.   Allergic/Immunologic: Negative for food allergies and immunocompromised state.   Neurological: Positive for weakness (generalized ). Negative for dizziness, seizures, syncope, facial asymmetry, light-headedness and headaches.        He has intermittent episodes of confusion and has history of falls.   Hematological: Negative for adenopathy.   Psychiatric/Behavioral: Positive for confusion. Negative for agitation and behavioral problems. The patient is not nervous/anxious.      Objective:     Vital Signs (Most Recent):  Temp: 97.1 °F (36.2 °C) (11/21/17 0312)  Pulse: (!) 113 (11/21/17 0312)  Resp: 20 (11/21/17 0312)  BP: 132/79 (11/21/17 0312)  SpO2: 95 % (11/21/17 0312) Vital Signs (24h Range):  Temp:  [97.1  °F (36.2 °C)-98.3 °F (36.8 °C)] 97.1 °F (36.2 °C)  Pulse:  [100-114] 113  Resp:  [18-20] 20  SpO2:  [94 %-99 %] 95 %  BP: (121-143)/(58-82) 132/79     Weight: 90.5 kg (199 lb 8.3 oz)  Body mass index is 26.32 kg/m².    Estimated Creatinine Clearance: 101.3 mL/min (based on SCr of 0.8 mg/dL).    Physical Exam   Constitutional: He appears well-developed and well-nourished. He appears lethargic. He is sleeping. He is easily aroused. No distress.   HENT:   Head: Normocephalic and atraumatic.   Eyes: Conjunctivae and EOM are normal. Pupils are equal, round, and reactive to light.   Neck: Normal range of motion. Neck supple. No JVD present.   Cardiovascular: Regular rhythm, S1 normal, S2 normal and intact distal pulses.   No extrasystoles are present. Tachycardia present.  Exam reveals no gallop.    No murmur heard.  Pulses:       Radial pulses are 2+ on the right side, and 2+ on the left side.        Dorsalis pedis pulses are 2+ on the right side, and 2+ on the left side.        Posterior tibial pulses are 2+ on the right side, and 2+ on the left side.   Pulmonary/Chest: Effort normal and breath sounds normal. No accessory muscle usage. No tachypnea. No respiratory distress. He has no decreased breath sounds. He has no wheezes. He has no rales.   Abdominal: Soft. Bowel sounds are normal. He exhibits no distension. There is no tenderness. There is no rebound and no guarding.   Musculoskeletal: Normal range of motion. He exhibits no edema, tenderness or deformity.   Neurological: He is easily aroused. He appears lethargic. No cranial nerve deficit or sensory deficit. GCS eye subscore is 3. GCS verbal subscore is 5. GCS motor subscore is 6.   Not oriented to place,has intermittent episodes of confusion.   Skin: Skin is warm and dry. Capillary refill takes less than 2 seconds. Abrasion (midback, no drainage appreciated) noted. No rash noted. He is not diaphoretic. No erythema.   Psychiatric: His speech is normal. Thought  content normal. His mood appears not anxious. He is not actively hallucinating. Cognition and memory are normal. He does not exhibit a depressed mood. He is attentive.   Nursing note and vitals reviewed.      Significant Labs:   Blood Culture:     Recent Labs  Lab 11/12/17  1540 11/12/17  1555   LABBLOO No growth after 5 days. No growth after 5 days.     CBC:     Recent Labs  Lab 11/20/17  0516   WBC 4.10   HGB 12.5*   HCT 37.1*   PLT 97*     HIV 1/2 Antibodies: No results for input(s): [id number] in the last 48 hours.  All pertinent labs within the past 24 hours have been reviewed.    Significant Imaging: I have reviewed all pertinent imaging results/findings within the past 24 hours.

## 2017-11-21 NOTE — PLAN OF CARE
Problem: Patient Care Overview  Goal: Plan of Care Review  Outcome: Ongoing (interventions implemented as appropriate)  GOOD PARTICIPATION, MARCELA FOR TF'S AND GAIT USING RW

## 2017-11-21 NOTE — ASSESSMENT & PLAN NOTE
cont po Bactrim, prednisone, azithromax  Duonebs.  Blood/Urine cultures show NGTD  Hypoxia resolved

## 2017-11-21 NOTE — PLAN OF CARE
Problem: Patient Care Overview  Goal: Plan of Care Review  Outcome: Ongoing (interventions implemented as appropriate)  Pt remained afebrile throughout this shift.   IV abx administered per order.   Pt remained free of falls this shift.   Plan of care reviewed. Patient verbalizes understanding.   Pt moving/turing independently.   Patient ST on monitor.   Bed low, side rails up x 2, wheels locked, call light in reach.   Patient instructed to call for assistance.   Will continue to monitor.

## 2017-11-21 NOTE — ASSESSMENT & PLAN NOTE
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    +RPR  Dr. López recommended treatment for neurosyphilis with IV PCN until results are known - may be discharged on IV PCN and follow up with ID. SNF will not accept continuous IV ABx. Will need LTAC for continuous ABx.

## 2017-11-21 NOTE — PROGRESS NOTES
[facility] Hospital Medicine  Progress Note    Patient Name: Jeffrey Rosales  MRN: [id number]  Patient Class: IP- Inpatient   Admission Date: 11/12/2017  Length of Stay: 8 days  Attending Physician: Joe Jacob MD  Primary Care Provider: Provider Notinsystem    Subjective:     Principal Problem:Encephalopathy acute    HPI:  Patient is poor historian due to somnolence.  History obtained from patient's son, ED staff, and past medical record in Care Everywhere.  Mr. Rosales is a 65 yo male  with a PMHx of HIV/AIDS, HCV, HTN, HLD, DM II, DDD with chronic back pain, PUD, and h/o anal cancer, who presented to the ED with c/o generalized weakness, fatigue, and malaise that has progressively worsened over the past 2 weeks.  Associated subjective fevers, lethargy, and ~15-20 pound weight loss (unintentional) in past 3 months .  Denies any chest pain, palpitations, SOB/STREET, cough, orthopnea, PND, edema, weight gain/loss, decreased appetite, ABD pain or distention, N/V/D, melena/rectal bleeding, dysuria, hematuria, lightheadedness/dizziness, HA, visual disturbance, focal deficit, syncope, diaphoresis, or chills.  Patient's son also reports new abrasion to mid back that he noticed a few days ago with clear drainage.  Initial work-up in ED noted temp 100.6, , RR 23, O2 sat 92% on RA.  ABG resulted pH 7.4, CO2 31, O2 52, HCO3 19.7.  UA unremarkable.  CT head with no acute process.  CXR with bilateral infiltrates consistent with PCP pneumonia.  Hospital Medicine was consulted for admission.  Currently, patient appears comfortable, in NAD.  He remains lethargic, but easily aroused.    Hospital Course:  Pt admitted with Acute hypoxic respiratory failure and PCP Pneumonia and started on IVF and IV Bactrim and IV steroids.  CD4 count is now 71, Viral load is 6.4 million. LDH elevated. There is documented noncompliance with HAART per Dr. Pete in care everywhere. Dr Beaver at [facility] ID stopped his antiretrovirals  during the last visit in August 2017.     Pt's confusion is worsening with hallucinations and strange behavior- but still often answering questions appropriately -Dr. lópez states behavior is concerning for FELICITY virus (NEGATIVE) and Neuro syphillis. Pt reports memory problems and tremors x 2 weeks. LP cultures for VDRL still pending.    Dr. López  also recommended Azithromycin ppx     Offered nursing home placement to assist with med compliance and for therapy but pt refused.  Discussed grave prognosis without compliance with medications- pt verbalized understanding and declined Hospice. He reported he will be compliant with HAART and antibiotics. Dr. López suspects HIV resistence to HAART- will refer pt back to his ID. With pt's permission I discussed plan of care and pt status with pt's daughter Sariah who verbalized understanding. Daughter and pt's ex wife request SNF - Initially, pt declined but now agrees.     Pt continues to hallucinate- strange behavior escalating - refuses clothes and is urinating on himself. Fatigue and malaise improved. Still very weak. RPR Positive- Dr. López recommends empiric treatment with IV penicillin until VDRL resulted. If CSF VDRL negative/Positive, will call Dr López for ABx recommendations. SNF will not take patient on continuous IV PCN. Wife at bedside.    Interval History: CSF VDRL pending. [facility] lab [phone] states VDRL should result 11/21/17.      Review of Systems   Constitutional: Positive for activity change and unexpected weight change. Negative for appetite change, chills, diaphoresis, fatigue and fever.   HENT: Negative for congestion, nosebleeds, sore throat and trouble swallowing.    Eyes: Negative for pain, discharge and visual disturbance.   Respiratory: Negative for apnea, cough, chest tightness, shortness of breath, wheezing and stridor.    Cardiovascular: Negative for chest pain, palpitations and leg swelling.   Gastrointestinal: Negative for  abdominal distention, abdominal pain, blood in stool, constipation, diarrhea, nausea and vomiting.   Endocrine: Negative for cold intolerance and heat intolerance.   Genitourinary: Negative for difficulty urinating, dysuria, flank pain, frequency and urgency.   Musculoskeletal: Negative for arthralgias, back pain, joint swelling, myalgias, neck pain and neck stiffness.   Skin: Negative for rash and wound.   Allergic/Immunologic: Negative for food allergies and immunocompromised state.   Neurological: Positive for tremors. Negative for dizziness, seizures, syncope, facial asymmetry, weakness, light-headedness and headaches.   Hematological: Negative for adenopathy.   Psychiatric/Behavioral: Positive for confusion and hallucinations. Negative for agitation and behavioral problems. The patient is not nervous/anxious.      Objective:     Vital Signs (Most Recent):  Temp: 98.1 °F (36.7 °C) (11/20/17 1600)  Pulse: (!) 113 (11/20/17 1754)  Resp: 18 (11/20/17 1600)  BP: 128/80 (11/20/17 1600)  SpO2: 98 % (11/20/17 1600) Vital Signs (24h Range):  Temp:  [97.2 °F (36.2 °C)-98.3 °F (36.8 °C)] 98.1 °F (36.7 °C)  Pulse:  [] 113  Resp:  [18-20] 18  SpO2:  [94 %-98 %] 98 %  BP: (116-143)/(58-82) 128/80     Weight: 90.8 kg (200 lb 2.8 oz)  Body mass index is 26.41 kg/m².    Intake/Output Summary (Last 24 hours) at 11/20/17 1836  Last data filed at 11/20/17 1557   Gross per 24 hour   Intake              600 ml   Output              350 ml   Net              250 ml      Physical Exam   Constitutional: He appears well-developed and well-nourished.   HENT:   Head: Normocephalic and atraumatic.   Nose: Nose normal.   Eyes: Conjunctivae and EOM are normal. Pupils are equal, round, and reactive to light.   Neck: Normal range of motion. Neck supple. No JVD present. No tracheal deviation present. No thyromegaly present.   Cardiovascular: Normal rate, regular rhythm, normal heart sounds and intact distal pulses.  Exam reveals no gallop  and no friction rub.    No murmur heard.  Pulmonary/Chest: Effort normal. No stridor. No respiratory distress. He has no wheezes. He has no rales. He exhibits no tenderness.   Abdominal: Soft. Bowel sounds are normal. He exhibits no distension. There is no tenderness. There is no rebound and no guarding.   Musculoskeletal: Normal range of motion. He exhibits no edema, tenderness or deformity.   Neurological: He has normal reflexes. No cranial nerve deficit. Coordination normal.   Skin: Skin is warm and dry. No rash noted. No erythema. No pallor.   Tatoos. Stage 3 pressure ulcer to coccyx, burn to mid back   Psychiatric:   Confused, disoriented   Nursing note and vitals reviewed.    Significant Labs:   CBC:   Recent Labs  Lab 11/19/17 0518 11/20/17 0516   WBC 3.81* 4.10   HGB 12.5* 12.5*   HCT 36.5* 37.1*   PLT 90* 97*     CMP:   Recent Labs  Lab 11/19/17 0518 11/20/17 0516   * 132*   K 4.8 4.7    103   CO2 23 22*   * 261*   BUN 15 17   CREATININE 0.7 0.8   CALCIUM 10.1 10.4   PROT 6.4 6.5   ALBUMIN 2.1* 2.0*   BILITOT 0.6 0.6   ALKPHOS 179* 205*   * 86*   * 106*   ANIONGAP 5* 7*   EGFRNONAA >60 >60       Significant Imaging: I have reviewed all pertinent imaging results/findings within the past 24 hours.    Assessment/Plan:      * Encephalopathy acute    Hallucinations and inappropriate behavior worsening- concerning for neurosyphilis and PML s/p LP -CSF results  for FELICITY virus Negative, but CSF VDRL pending   -[facility] lab [phone] states VDRL should result 11/21/17.    +RPR  Dr. López recommended treatment for neurosyphilis with IV PCN until results are known - may be discharged on IV PCN and follow up with ID. SNF will not accept continuous IV ABx. Will need LTAC for continuous ABx.           PCP Pneumonia withy acute Hypoxemic Resp Failure     cont po Bactrim, prednisone, azithromax  Duonebs.  Blood/Urine cultures show NGTD  Hypoxia resolved        AIDS (acquired  immunodeficiency syndrome), CD4 <=200    CD4 count 71, viral load of 6.5 million on 8/10/2017.  Patient has been off HAART since 4/2017 due to noncompliance.  Follows with Dr. Pete with [facility].  Care discussed with Infectious disease  Start Azithromycin ppx   Pt requests to be re-started on HAART  Pt underwent LP for Neuro syphilis and FELICITY virus Negative - VDRL cultures pending - on IV PCN until results known          Pressure ulcer of coccygeal region, stage 3    Wound care  To coccygeal area and mid back from heating pad burn          Thrombocytopenia associated with AIDS    Monitor           Generalized weakness    PT/OT  Family requests SNF. Social Service states SNF won't accept patient on continuous ABX.            Chronic hepatitis C    LFTs mildly increased- monitor           Essential hypertension    BP controlled currently.  Continue home amlodipine.            VTE Risk Mitigation         Ordered     Medium Risk of VTE  Once      11/12/17 2054     Place sequential compression device  Until discontinued      11/12/17 2054        Jigna Rose NP  Department of Hospital Medicine   [facility]

## 2017-11-21 NOTE — PT/OT/SLP PROGRESS
Physical Therapy  Treatment    Jeffrey Rosales   MRN: [id number]   Admitting Diagnosis: Encephalopathy acute    PT Received On: 11/21/17  PT Start Time: 0855     PT Stop Time: 0920    PT Total Time (min): 25 min       Billable Minutes:  Gait [id number] and Therapeutic Exercise 10    Treatment Type: Treatment  PT/PTA: PT       General Precautions: Standard, fall  Orthopedic Precautions: N/A   Braces: N/A    Subjective:  Communicated with NURSE CAILIN prior to session  Pain/Comfort  Pain Rating 1: 0/10    Objective:   Patient found with: telemetry, peripheral IV    Functional Mobility:  Bed Mobility:   Scooting/Bridging: Contact Guard Assistance    Transfers:  Sit <> Stand Assistance: Minimum Assistance  Sit <> Stand Assistive Device: Rolling Walker  Bed <> Chair Technique: Stand Pivot  Bed <> Chair Assistance: Minimum Assistance  Bed <> Chair Assistive Device: Rolling Walker    Gait:   Gait Distance: PT AMB 25' WITH RW AND MARCELA, SLOW PACED GAIT, CUES FOR UPRIGHT POSTURE, QUICK TO FATIGUE  Assistance 1: Minimum assistance  Gait Assistive Device: Rolling walker  Gait Pattern: swing-through gait  Gait Deviation(s): decreased phillip, decreased step length, decreased toe-to-floor clearance, decreased weight-shifting ability    Balance:   Static Sit: FAIR  Dynamic Sit: FAIR  Static Stand: POOR+  Dynamic stand:  POOR+    Therapeutic Activities and Exercises:  PT SLOW MOVING WITH ALL FUNCTIONAL MOBILITY, MODA FOR SONIA DUMONT, PT EDUCATED IN AND PERFORMED BLE THEREX X 20 REPS AROM, CUES TO STAY ON TASK, PT INCONTINENT OF URINE IN BED, AIDE CALLED FOR CLEANING AND LINEN CHANGE    AM-PAC 6 CLICK MOBILITY  How much help from another person does this patient currently need?   1 = Unable, Total/Dependent Assistance  2 = A lot, Maximum/Moderate Assistance  3 = A little, Minimum/Contact Guard/Supervision  4 = None, Modified [place]/Independent    Turning over in bed (including adjusting bedclothes, sheets and blankets)?:  3  Sitting down on and standing up from a chair with arms (e.g., wheelchair, bedside commode, etc.): 3  Moving from lying on back to sitting on the side of the bed?: 3  Moving to and from a bed to a chair (including a wheelchair)?: 3  Need to walk in hospital room?: 3  Climbing 3-5 steps with a railing?: 1  Total Score: 16    AM-PAC Raw Score CMS G-Code Modifier Level of Impairment Assistance   6 % Total / Unable   7 - 9 CM 80 - 100% Maximal Assist   10 - 14 CL 60 - 80% Moderate Assist   15 - 19 CK 40 - 60% Moderate Assist   20 - 22 CJ 20 - 40% Minimal Assist   23 CI 1-20% SBA / CGA   24 CH 0% Independent/ Mod I     Patient left up in chair with all lines intact, call button in reach, NURSE notified and FAMILY present.    Assessment:  Jeffrey Rosales is a 66 y.o. male with a medical diagnosis of Encephalopathy acute   PT WILL BENEFIT FROM CONT. SKILLED P.T. TO ADDRESS IMPAIRMENTS    Rehab identified problem list/impairments: Rehab identified problem list/impairments: impaired endurance, impaired functional mobilty, gait instability, impaired balance, decreased coordination, decreased safety awareness    Rehab potential is good.    Activity tolerance: Good    Discharge recommendations: Discharge Facility/Level Of Care Needs: nursing facility, skilled     Barriers to discharge: Barriers to Discharge: Inaccessible home environment, Decreased caregiver support    Equipment recommendations: Equipment Needed After Discharge: walker, rolling, bath bench     GOALS:    Physical Therapy Goals        Problem: Physical Therapy Goal    Goal Priority Disciplines Outcome Goal Variances Interventions   Physical Therapy Goal     PT/OT, PT      Description:  LTG'S TO BE MET IN 7 DAYS (11-22-17)  1. PT WILL REQUIRE CGA FOR BED MOBILITY  2. PT WILL REQUIRE CGA FOR TF'S  3. PT WILL ' WITH RW AND MARCELA  4. PT WILL TOLERATE BLE THEREX X 20 REPS AROM  5. PT WILL DEMO F- DYNAMIC BALANCE DURING GAIT                     PLAN:     Patient to be seen 5 x/week  to address the above listed problems via gait training, therapeutic activities, therapeutic exercises  Plan of Care expires: 11/22/17  Plan of Care reviewed with: patient, spouse    PT ENCOURAGED TO CALL FOR ASSISTANCE WITH ALL NEEDS DUE TO FALL RISK STATUS, PT AGREEABLE.  PT ENCOURAGED TO INCREASE TIME OOB IN CHAIR, ALL MEALS IN CHAIR OOB, PT AGREEABLE    Ashanti Joaquin, PT  11/21/2017

## 2017-11-21 NOTE — PROGRESS NOTES
[facility]  Infectious Disease  Progress Note    Patient Name: Jeffrey Rosales  MRN: [id number]  Admission Date: 11/12/2017  Length of Stay: 9 days  Attending Physician: Joe Jacob MD  Primary Care Provider: Provider Notinsystem    Isolation Status: No active isolations  Assessment/Plan:      * Encephalopathy acute    .   CSF results  for FELICITY virus is negative .    CSF VDRL is still pending     Will continue  IV penicillin for presumed neurosyphillis until CSF VDRL is known.  Results will be known tomorrow .        AIDS (acquired immunodeficiency syndrome), CD4 <=200    Latest cd4 count -08/2017 -247.  CD 4 count is 71-CD% -3.8.  Will follow HIV genotype             Anticipated Disposition:     Thank you for your consult. I will follow-up with patient. Please contact us if you have any additional questions.    Jeffrey López MD  Infectious Disease  [facility]    Subjective:     Principal Problem:Encephalopathy acute    HPI: Patient is poor historian due to somnolence.  History obtained from patient's daughter , ED staff, and past medical record in Care Everywhere.  66 year old  male  with history of of HIV/AIDS-cd4 count on 08/2017-247,cd4% 28 ,HIV viral load -6.4 million , HCV, HTN, HLD, DM II, DDD with chronic back pain, PUD, and h/o anal cancer, who presented to the ED with c/o generalized weakness, fatigue, and malaise that has progressively worsened over the past 2 weeks.  Associated subjective fevers, lethargy, and ~15-20 pound weight loss (unintentional) in past 3 months .  .  Patient's son also reports new abrasion to mid back that he noticed a few days ago with clear drainage.  Initial work-up in ED noted temp 100.6, , RR 23, O2 sat 92% on RA.  CT head with no acute process.  CTA of the chest -11/01-  2.  1.6 cm right lower lobe pulmonary nodule.    3.  Scattered pulmonary emphysema changes.    From the last ID follow up note on 08/27-[facility] reported that he has not  picked up antiretrovirals in months. Last refill date for Tivicay February 2017 and Truvada April 2017.  He follows up with [facility] oncology for anal cancer.  At that visit ,his HAART therapy was stopped.    Interval History:   66 year old man with AIDS-    CD4 COUNT - 71,CD%-3.8.  VIRAL load is 487,107  He has episodes of confusion.  He has past history of syphilis.    He was admitted with history of falls.  LP showed wbc 4 ,csf protein is 54.    CSF FELICITY  Virus-negative ,CSF VDRL-pending     Review of Systems   Constitutional: Positive for activity change, fatigue and unexpected weight change. Negative for appetite change, chills, diaphoresis and fever.   HENT: Negative for congestion, nosebleeds, sore throat and trouble swallowing.    Eyes: Negative for pain, discharge and visual disturbance.   Respiratory: Positive for shortness of breath. Negative for apnea, cough, chest tightness, wheezing and stridor.    Cardiovascular: Negative for chest pain, palpitations and leg swelling.   Gastrointestinal: Negative for abdominal distention, abdominal pain, blood in stool, constipation, diarrhea, nausea and vomiting.   Endocrine: Negative for cold intolerance and heat intolerance.   Genitourinary: Negative for difficulty urinating, dysuria, flank pain, frequency and urgency.   Musculoskeletal: Negative for arthralgias, back pain, joint swelling, myalgias, neck pain and neck stiffness.   Skin: Negative for rash and wound.   Allergic/Immunologic: Negative for food allergies and immunocompromised state.   Neurological: Positive for weakness (generalized ). Negative for dizziness, seizures, syncope, facial asymmetry, light-headedness and headaches.        He has intermittent episodes of confusion and has history of falls.   Hematological: Negative for adenopathy.   Psychiatric/Behavioral: Positive for confusion. Negative for agitation and behavioral problems. The patient is not nervous/anxious.      Objective:     Vital Signs (Most  Recent):  Temp: 97.1 °F (36.2 °C) (11/21/17 0312)  Pulse: (!) 113 (11/21/17 0312)  Resp: 20 (11/21/17 0312)  BP: 132/79 (11/21/17 0312)  SpO2: 95 % (11/21/17 0312) Vital Signs (24h Range):  Temp:  [97.1 °F (36.2 °C)-98.3 °F (36.8 °C)] 97.1 °F (36.2 °C)  Pulse:  [100-114] 113  Resp:  [18-20] 20  SpO2:  [94 %-99 %] 95 %  BP: (121-143)/(58-82) 132/79     Weight: 90.5 kg (199 lb 8.3 oz)  Body mass index is 26.32 kg/m².    Estimated Creatinine Clearance: 101.3 mL/min (based on SCr of 0.8 mg/dL).    Physical Exam   Constitutional: He appears well-developed and well-nourished. He appears lethargic. He is sleeping. He is easily aroused. No distress.   HENT:   Head: Normocephalic and atraumatic.   Eyes: Conjunctivae and EOM are normal. Pupils are equal, round, and reactive to light.   Neck: Normal range of motion. Neck supple. No JVD present.   Cardiovascular: Regular rhythm, S1 normal, S2 normal and intact distal pulses.   No extrasystoles are present. Tachycardia present.  Exam reveals no gallop.    No murmur heard.  Pulses:       Radial pulses are 2+ on the right side, and 2+ on the left side.        Dorsalis pedis pulses are 2+ on the right side, and 2+ on the left side.        Posterior tibial pulses are 2+ on the right side, and 2+ on the left side.   Pulmonary/Chest: Effort normal and breath sounds normal. No accessory muscle usage. No tachypnea. No respiratory distress. He has no decreased breath sounds. He has no wheezes. He has no rales.   Abdominal: Soft. Bowel sounds are normal. He exhibits no distension. There is no tenderness. There is no rebound and no guarding.   Musculoskeletal: Normal range of motion. He exhibits no edema, tenderness or deformity.   Neurological: He is easily aroused. He appears lethargic. No cranial nerve deficit or sensory deficit. GCS eye subscore is 3. GCS verbal subscore is 5. GCS motor subscore is 6.   Not oriented to place,has intermittent episodes of confusion.   Skin: Skin is warm  and dry. Capillary refill takes less than 2 seconds. Abrasion (midback, no drainage appreciated) noted. No rash noted. He is not diaphoretic. No erythema.   Psychiatric: His speech is normal. Thought content normal. His mood appears not anxious. He is not actively hallucinating. Cognition and memory are normal. He does not exhibit a depressed mood. He is attentive.   Nursing note and vitals reviewed.      Significant Labs:   Blood Culture:     Recent Labs  Lab 11/12/17  1540 11/12/17  1555   LABBLOO No growth after 5 days. No growth after 5 days.     CBC:     Recent Labs  Lab 11/20/17  0516   WBC 4.10   HGB 12.5*   HCT 37.1*   PLT 97*     HIV 1/2 Antibodies: No results for input(s): [id number] in the last 48 hours.  All pertinent labs within the past 24 hours have been reviewed.    Significant Imaging: I have reviewed all pertinent imaging results/findings within the past 24 hours.

## 2017-11-21 NOTE — PLAN OF CARE
CM met with Dr Jacob.  Awaiting CSF results to determine treatment.  Discharge plan is SNF vs LTAC depending on results

## 2017-11-21 NOTE — PROGRESS NOTES
Received report from previous nurse.  Agree with assessment.  Patient stable with no complaints or concerns at this time.

## 2017-11-21 NOTE — ASSESSMENT & PLAN NOTE
.   CSF results  for FELICITY virus is negative .    CSF VDRL is still pending     Will continue  IV penicillin for presumed neurosyphillis until CSF VDRL is known.  Results will be known tomorrow .

## 2017-11-21 NOTE — ASSESSMENT & PLAN NOTE
CD4 count 71, viral load of 6.5 million on 8/10/2017.  Patient has been off HAART since 4/2017 due to noncompliance.  Follows with Dr. Pete with [facility].  Care discussed with Infectious disease  Start Azithromycin ppx   Pt requests to be re-started on HAART  Pt underwent LP for Neuro syphilis and FELICITY virus Negative - VDRL cultures pending - on IV PCN until results known

## 2017-11-21 NOTE — PLAN OF CARE
Problem: Patient Care Overview  Goal: Plan of Care Review  Outcome: Ongoing (interventions implemented as appropriate)  Pt repositions independently. Sinus tachy in 110s during shift. Mepilex dressing changed to sacral area and back area dressing. BG monitoring. Family at bedside. Bed alarm activated.   Fall precautions in place. Side rails up. Bed locked and low in position. Call light and personal items within reach. 24 hour order chart check completed. Pt educated on medication's side effects. Pt verbalized understanding.   Will continue to monitor.

## 2017-11-22 PROBLEM — R53.1 GENERALIZED WEAKNESS: Status: RESOLVED | Noted: 2017-01-01 | Resolved: 2017-01-01

## 2017-11-22 PROBLEM — G93.40 ENCEPHALOPATHY ACUTE: Status: RESOLVED | Noted: 2017-01-01 | Resolved: 2017-01-01

## 2017-11-22 NOTE — PLAN OF CARE
Problem: Patient Care Overview  Goal: Plan of Care Review  Outcome: Ongoing (interventions implemented as appropriate)  Recommendations     Recommendation/Intervention: 1.Continue current diet. 2. Will continue to monitor.   Goals: Intake of 85 - 100 % of meals by next RD visit  Nutrition Goal Status: goal met  Communication of RD Recs:  (care plan and sticky note)

## 2017-11-22 NOTE — ASSESSMENT & PLAN NOTE
cont po Bactrim, prednisone, azithromax  Duonebs.  Blood/Urine cultures show NGTD  Hypoxia resolved    11/21 - PCP on bactrim

## 2017-11-22 NOTE — PROGRESS NOTES
Went over discharge instructions with patient.   Patient verbalized understanding and has no questions in regards to discharge.  Telemetry box removed.  Patient dressed and awaiting ride.

## 2017-11-22 NOTE — DISCHARGE SUMMARY
[facility] Hospital Medicine  Discharge Summary      Patient Name: Jeffrey Rosales  MRN: [id number]  Admission Date: 11/12/2017  Hospital Length of Stay: 10 days  Discharge Date and Time:  11/22/2017 5:58 PM  Attending Physician: Tommy Myles MD  Discharging Provider: Luba Simmons NP  Primary Care Provider: Provider Notinsystem      HPI:   Patient is poor historian due to somnolence.  History obtained from patient's son, ED staff, and past medical record in Care Everywhere.  Mr. Rosales is a 65 yo male  with a PMHx of HIV/AIDS, HCV, HTN, HLD, DM II, DDD with chronic back pain, PUD, and h/o anal cancer, who presented to the ED with c/o generalized weakness, fatigue, and malaise that has progressively worsened over the past 2 weeks.  Associated subjective fevers, lethargy, and ~15-20 pound weight loss (unintentional) in past 3 months .  Denies any chest pain, palpitations, SOB/STREET, cough, orthopnea, PND, edema, weight gain/loss, decreased appetite, ABD pain or distention, N/V/D, melena/rectal bleeding, dysuria, hematuria, lightheadedness/dizziness, HA, visual disturbance, focal deficit, syncope, diaphoresis, or chills.  Patient's son also reports new abrasion to mid back that he noticed a few days ago with clear drainage.  Initial work-up in ED noted temp 100.6, , RR 23, O2 sat 92% on RA.  ABG resulted pH 7.4, CO2 31, O2 52, HCO3 19.7.  UA unremarkable.  CT head with no acute process.  CXR with bilateral infiltrates consistent with PCP pneumonia.  Hospital Medicine was consulted for admission.  Currently, patient appears comfortable, in NAD.  He remains lethargic, but easily aroused.    * No surgery found *      Hospital Course:   Pt admitted with Acute hypoxic respiratory failure and PCP Pneumonia and started on IVF and IV Bactrim and IV steroids.  CD4 count is now 71, Viral load is 6.4 million. LDH elevated. There is documented noncompliance with HAART per Dr. Pete in care everywhere.   [facility] stopped his antiretrovirals during the last visit in August 2017.     Pt's confusion is worsening with hallucinations and strange behavior- but still often answering questions appropriately -Dr. lópez states behavior is concerning for FELICITY virus (NEGATIVE) and Neuro syphillis. Pt reports memory problems and tremors x 2 weeks. LP cultures for VDRL proved to be negative for Neuro syphillis   Dr. López  also recommended Azithromycin ppx     Offered nursing home placement to assist with med compliance and for therapy.  Discussed grave prognosis without compliance with medications- pt verbalized understanding and declined Hospice. He reported he will be compliant with HAART and antibiotics. Dr. López suspects HIV resistence to HAART- will refer pt back to his ID. With pt's permission I discussed plan of care and pt status with pt's daughter Sariah who verbalized understanding. Daughter and pt's ex wife request SNF - Initially, pt declined but later agreed.     Pt continues to hallucinate- strange behavior escalating - refuses clothes and is urinating on himself. Fatigue and malaise improved. Still very weak. RPR Positive- Dr. López recommends empiric treatment with IV penicillin.  CSF VDRL proved negative for Neuro Syphillis however, he received Bicillin for treatment of presumed latent syphillis.    He was seen and examined and determined to be safe and stable for discharge. He was discharged to [facility] to complete ABX for PCP pneumonia and PT/OT. He was advised to follow up with PCP and Infectious Ds after discharge from SNF.      Consults:   Consults         Status Ordering Provider     Inpatient consult to Infectious Diseases  Once     Provider:  (Not yet assigned)    Completed MARCIE GUZMAN     Inpatient consult to Social Work  Once     Provider:  (Not yet assigned)    Completed ANGELA BERTRAND     Inpatient consult to Social Work  Once     Provider:  (Not yet assigned)    Completed JERZY  ANGELA     Inpatient consult to Social Work  Once     Provider:  (Not yet assigned)    Completed ANGELA BERTRAND     IP consult to dietary  Once     Provider:  (Not yet assigned)    Completed ARIN AMADOR          No new Assessment & Plan notes have been filed under this hospital service since the last note was generated.  Service: Hospital Medicine    Final Active Diagnoses:    Diagnosis Date Noted POA    Thrombocytopenia associated with AIDS [B20, D69.59] 11/16/2017 Yes    Pressure ulcer of coccygeal region, stage 3 [L89.153] 11/14/2017 Yes    PCP Pneumonia withy acute Hypoxemic Resp Failure  [J18.9] 11/12/2017 Yes    Essential hypertension [I10] 11/12/2017 Yes     Chronic    Chronic hepatitis C [B18.2] 05/08/2015 Yes     Chronic    AIDS (acquired immunodeficiency syndrome), CD4 <=200 [B20] 12/07/1999 Yes      Problems Resolved During this Admission:    Diagnosis Date Noted Date Resolved POA    PRINCIPAL PROBLEM:  Encephalopathy acute [G93.40] 11/15/2017 11/22/2017 Yes    Generalized weakness [R53.1] 11/14/2017 11/22/2017 Yes    Type 2 diabetes mellitus [E11.9] 11/12/2017 11/14/2017 Yes     Chronic    Severe sepsis [A41.9, R65.20] 11/12/2017 11/14/2017 Yes       Discharged Condition: stable    Disposition: Skilled Nursing Facility    Follow Up:  Follow-up Information     Please follow up.    Why:  Infectious disease (Dr. Beaver) in one week            Provider Notinsystem In 3 days.           [facility].    Specialty:  Home Health Services  Contact information:  [address]  [place] [address] [phone]             Dr Beaver- Infectious Disease On 11/27/2017.    Why:  at 2pm  Contact information:  [address] [facility]  [place]  [address]  [phone]           [facility].    Specialties:  Nursing Home Agency, SNF Agency  Contact information:  [address] [id number] [phone]             Infectious Disease.    Why:  After discharge from  "[facility], follow up regarding PCP pneumonia               Patient Instructions:     NEBULIZER FOR HOME USE   Order Specific Question Answer Comments   Height: 6' 1" (1.854 m)    Weight: 97.5 kg (214 lb 15.2 oz)    Does patient have medical equipment at home? cane, quad    Length of need (1-99 months): 99      Diet general   Scheduling Instructions: Glucerna one can TID   Order Specific Question Answer Comments   Additional restrictions: Diabetic 2000      Diet general     Activity as tolerated     Activity as tolerated     Call MD for:  temperature >100.4     Call MD for:  persistent nausea and vomiting or diarrhea     Call MD for:  difficulty breathing or increased cough     Call MD for:  increased confusion or weakness         Significant Diagnostic Studies: Labs:   CMP   Recent Labs  Lab 11/21/17  0517 11/22/17  0602   * 134*   K 4.9 4.5    102   CO2 23 25   * 252*   BUN 16 17   CREATININE 0.8 0.8   CALCIUM 10.7* 10.8*   PROT 6.6 6.9   ALBUMIN 2.1* 2.3*   BILITOT 0.6 0.7   ALKPHOS 215* 234*   AST 86* 80*   * 126*   ANIONGAP 7* 7*   ESTGFRAFRICA >60 >60   EGFRNONAA >60 >60    and CBC   Recent Labs  Lab 11/21/17  0517 11/22/17  0602   WBC 4.00 4.67   HGB 12.7* 13.1*   HCT 37.6* 38.8*   PLT 96* 89*       Pending Diagnostic Studies:     None         Medications:  Reconciled Home Medications:   Discharge Medication List as of 11/22/2017  2:26 PM      START taking these medications    Details   albuterol-ipratropium 2.5mg-0.5mg/3mL (DUO-NEB) 0.5 mg-3 mg(2.5 mg base)/3 mL nebulizer solution Take 3 mLs by nebulization every 6 (six) hours while awake. Rescue, Starting Wed 11/15/2017, Until Thu 11/15/2018, Normal      insulin detemir (LEVEMIR FLEXTOUCH) 100 unit/mL (3 mL) SubQ InPn pen Inject 10 Units into the skin 2 (two) times daily., Starting Wed 11/22/2017, Until Thu 11/22/2018, No Print      potassium, sodium phosphates (PHOS-NAK) 280-160-250 mg PwPk Take 2 packets by mouth 4 (four) times " daily before meals and nightly., Starting Wed 11/15/2017, Until Mon 11/20/2017, Normal      sulfamethoxazole-trimethoprim 400-80mg (BACTRIM,SEPTRA) 400-80 mg per tablet Take 2 tablets by mouth 3 (three) times daily., Starting Wed 11/22/2017, No Print         CONTINUE these medications which have CHANGED    Details   azithromycin 200 mg/5 ml (ZITHROMAX) 200 mg/5 mL suspension Take 30 mLs (1,200 mg total) by mouth once a week., Starting Wed 11/22/2017, No Print      dolutegravir 50 mg Tab Take 1 tablet (50 mg total) by mouth once daily., Starting Wed 11/15/2017, Normal      emtricitabine-tenofovir 200-300 mg (TRUVADA) 200-300 mg Tab Take 1 tablet by mouth once daily., Starting Wed 11/15/2017, Normal      predniSONE (DELTASONE) 20 MG tablet Take 40mg oral twice daily for 5 days, 40mg oral daily x 5 days, then 20mg oral daily for 11 days then discontinue, No Print         CONTINUE these medications which have NOT CHANGED    Details   amLODIPine (NORVASC) 5 MG tablet Take 5 mg by mouth., Starting Wed 4/26/2017, Historical Med      aspirin (ECOTRIN) 81 MG EC tablet Take 81 mg by mouth., Starting Wed 11/30/2016, Historical Med      cyclobenzaprine (FLEXERIL) 10 MG tablet Take 10 mg by mouth., Historical Med      gabapentin (NEURONTIN) 800 MG tablet Take 800 mg by mouth., Starting Wed 4/26/2017, Until Thu 4/26/2018, Historical Med      insulin NPH-insulin regular, 70/30, (NOVOLIN 70/30) 100 unit/mL (70-30) injection Inject subcutaneously 60 units in the morning and 40 units in the evening., Historical Med      mv-min-folic acid-lutein 500-250 mcg Chew Take 1 tablet by mouth., Historical Med      nortriptyline (PAMELOR) 10 MG capsule TAKE 2 CAPS BY MOUTH AT BEDTIME, Historical Med      omeprazole (PRILOSEC) 40 MG capsule TAKE 1 CAPSULE BY MOUTH DAILY., Historical Med      pravastatin (PRAVACHOL) 20 MG tablet TAKE 1 TABLET BY MOUTH DAILY., Historical Med         STOP taking these medications       amoxicillin (AMOXIL) 500 MG  "capsule Comments:   Reason for Stopping:         bacitracin zinc 500 unit/gram Pack Comments:   Reason for Stopping:         blood sugar diagnostic Strp Comments:   Reason for Stopping:         chlorhexidine (PERIDEX) 0.12 % solution Comments:   Reason for Stopping:         enalapril (VASOTEC) 20 MG tablet Comments:   Reason for Stopping:         insulin syringe-needle U-100 (BD INSULIN SYRINGE ULTRA-FINE) 1/2 mL 31 gauge x 15/64" Syrg Comments:   Reason for Stopping:         insulin syringe-needle U-100 1 mL 28 gauge x 1/2" Syrg Comments:   Reason for Stopping:         lancets Misc Comments:   Reason for Stopping:         naproxen (NAPROSYN) 500 MG tablet Comments:   Reason for Stopping:         clarithromycin (BIAXIN) 500 MG tablet Comments:   Reason for Stopping:               Indwelling Lines/Drains at time of discharge:   Lines/Drains/Airways     Pressure Ulcer                 Pressure Ulcer 11/12/17 2021 posterior sacral spine Stage III 9 days                Time spent on the discharge of patient: > 30 minutes  Patient was seen and examined on the date of discharge and determined to be suitable for discharge.         Luba Simmons NP  Department of Hospital Medicine  [facility]  "

## 2017-11-22 NOTE — PROGRESS NOTES
[facility] Hospital Medicine  Progress Note    Patient Name: Jeffrey Rosales  MRN: [id number]  Patient Class: IP- Inpatient   Admission Date: 11/12/2017  Length of Stay: 9 days  Attending Physician: Joe Jacob MD  Primary Care Provider: Provider Notinsystem        Subjective:     Principal Problem:Encephalopathy acute    HPI:  Patient is poor historian due to somnolence.  History obtained from patient's son, ED staff, and past medical record in Care Everywhere.  Mr. Rosales is a 65 yo male  with a PMHx of HIV/AIDS, HCV, HTN, HLD, DM II, DDD with chronic back pain, PUD, and h/o anal cancer, who presented to the ED with c/o generalized weakness, fatigue, and malaise that has progressively worsened over the past 2 weeks.  Associated subjective fevers, lethargy, and ~15-20 pound weight loss (unintentional) in past 3 months .  Denies any chest pain, palpitations, SOB/STREET, cough, orthopnea, PND, edema, weight gain/loss, decreased appetite, ABD pain or distention, N/V/D, melena/rectal bleeding, dysuria, hematuria, lightheadedness/dizziness, HA, visual disturbance, focal deficit, syncope, diaphoresis, or chills.  Patient's son also reports new abrasion to mid back that he noticed a few days ago with clear drainage.  Initial work-up in ED noted temp 100.6, , RR 23, O2 sat 92% on RA.  ABG resulted pH 7.4, CO2 31, O2 52, HCO3 19.7.  UA unremarkable.  CT head with no acute process.  CXR with bilateral infiltrates consistent with PCP pneumonia.  Hospital Medicine was consulted for admission.  Currently, patient appears comfortable, in NAD.  He remains lethargic, but easily aroused.    Hospital Course:  Pt admitted with Acute hypoxic respiratory failure and PCP Pneumonia and started on IVF and IV Bactrim and IV steroids.  CD4 count is now 71, Viral load is 6.4 million. LDH elevated. There is documented noncompliance with HAART per Dr. Pete in care everywhere. Dr Beaver at [facility] stopped his  antiretrovirals during the last visit in August 2017.     Pt's confusion is worsening with hallucinations and strange behavior- but still often answering questions appropriately -Dr. lópez states behavior is concerning for FELICITY virus (NEGATIVE) and Neuro syphillis. Pt reports memory problems and tremors x 2 weeks. LP cultures for VDRL still pending.    Dr. López  also recommended Azithromycin ppx     Offered nursing home placement to assist with med compliance and for therapy but pt refused.  Discussed grave prognosis without compliance with medications- pt verbalized understanding and declined Hospice. He reported he will be compliant with HAART and antibiotics. Dr. López suspects HIV resistence to HAART- will refer pt back to his ID. With pt's permission I discussed plan of care and pt status with pt's daughter Sariah who verbalized understanding. Daughter and pt's ex wife request SNF - Initially, pt declined but now agrees.     Pt continues to hallucinate- strange behavior escalating - refuses clothes and is urinating on himself. Fatigue and malaise improved. Still very weak. RPR Positive- Dr. López recommends empiric treatment with IV penicillin until VDRL resulted. If CSF VDRL negative/Positive, will call Dr López for ABx recommendations. SNF will not take patient on continuous IV PCN. Wife at bedside. 11/21- awaiting result of VDRL in CSF to plan for duration of IV antibiotics.  CBG high, (is on steroids) will start long acting.  Discussed placement with case managment        Review of Systems   Constitutional: Positive for activity change and unexpected weight change. Negative for appetite change, chills, diaphoresis, fatigue and fever.   HENT: Negative for congestion, nosebleeds, sore throat and trouble swallowing.    Eyes: Negative for pain, discharge and visual disturbance.   Respiratory: Negative for apnea, cough, chest tightness, shortness of breath, wheezing and stridor.    Cardiovascular: Negative for  chest pain, palpitations and leg swelling.   Gastrointestinal: Negative for abdominal distention, abdominal pain, blood in stool, constipation, diarrhea, nausea and vomiting.   Endocrine: Negative for cold intolerance and heat intolerance.   Genitourinary: Negative for difficulty urinating, dysuria, flank pain, frequency and urgency.   Musculoskeletal: Negative for arthralgias, back pain, joint swelling, myalgias, neck pain and neck stiffness.   Skin: Negative for rash and wound.   Allergic/Immunologic: Negative for food allergies and immunocompromised state.   Neurological: Positive for tremors. Negative for dizziness, seizures, syncope, facial asymmetry, weakness, light-headedness and headaches.   Hematological: Negative for adenopathy.   Psychiatric/Behavioral: Positive for confusion and hallucinations. Negative for agitation and behavioral problems. The patient is not nervous/anxious.      Objective:     Vital Signs (Most Recent):  Temp: 97.6 °F (36.4 °C) (11/21/17 1951)  Pulse: (!) 114 (11/21/17 1951)  Resp: 20 (11/21/17 1951)  BP: 123/76 (11/21/17 1951)  SpO2: 98 % (11/21/17 2100) Vital Signs (24h Range):  Temp:  [97.1 °F (36.2 °C)-98 °F (36.7 °C)] 97.6 °F (36.4 °C)  Pulse:  [105-120] 114  Resp:  [18-20] 20  SpO2:  [95 %-100 %] 98 %  BP: (123-145)/(69-86) 123/76     Weight: 90.5 kg (199 lb 8.3 oz)  Body mass index is 26.32 kg/m².    Intake/Output Summary (Last 24 hours) at 11/21/17 2336  Last data filed at 11/21/17 0600   Gross per 24 hour   Intake              359 ml   Output             1700 ml   Net            -1341 ml      Physical Exam   Constitutional: He appears well-developed and well-nourished.   HENT:   Head: Normocephalic and atraumatic.   Nose: Nose normal.   Eyes: Conjunctivae and EOM are normal. Pupils are equal, round, and reactive to light.   Neck: Normal range of motion. Neck supple. No JVD present. No tracheal deviation present. No thyromegaly present.   Cardiovascular: Normal rate, regular  rhythm, normal heart sounds and intact distal pulses.  Exam reveals no gallop and no friction rub.    No murmur heard.  Pulmonary/Chest: Effort normal. No stridor. No respiratory distress. He has no wheezes. He has no rales. He exhibits no tenderness.   Abdominal: Soft. Bowel sounds are normal. He exhibits no distension. There is no tenderness. There is no rebound and no guarding.   Musculoskeletal: Normal range of motion. He exhibits no edema, tenderness or deformity.   Neurological: He has normal reflexes. No cranial nerve deficit. Coordination normal.   Skin: Skin is warm and dry. Capillary refill takes less than 2 seconds. No rash noted. No erythema. No pallor.   Tatoos. Stage 3 pressure ulcer to coccyx, burn to mid back   Psychiatric: He has a normal mood and affect. His behavior is normal. Judgment and thought content normal.   Confused, disoriented   Nursing note and vitals reviewed.      Significant Labs: All pertinent labs within the past 24 hours have been reviewed.    Significant Imaging: I have reviewed and interpreted all pertinent imaging results/findings within the past 24 hours.    Assessment/Plan:      * Encephalopathy acute    Hallucinations and inappropriate behavior worsening- concerning for neurosyphilis and PML s/p LP -CSF results  for FELICITY virus Negative, but CSF VDRL pending   -[facility] lab [phone] states VDRL should result 11/21/17.    +RPR  Dr. López recommended treatment for neurosyphilis with IV PCN until results are known - may be discharged on IV PCN and follow up with ID. SNF will not accept continuous IV ABx. Will need LTAC for continuous ABx.     11/21 - as above and awaiting VDRL from CSF prior to dispo          Thrombocytopenia associated with AIDS    Monitor     11/21 - currently improved.  INR 1.2, LP complete.  will start heparin.         Pressure ulcer of coccygeal region, stage 3    Wound care  To coccygeal area and mid back from heating pad burn           Generalized weakness    PT/OT  Family requests SNF. Social Service states SNF won't accept patient on continuous ABX.            Chronic hepatitis C    LFTs mildly increased- monitor           AIDS (acquired immunodeficiency syndrome), CD4 <=200    CD4 count 71, viral load of 6.5 million on 8/10/2017.  Patient has been off HAART since 4/2017 due to noncompliance.  Follows with Dr. Pete with [facility] infectious disease.  Care discussed with Infectious disease  Start Azithromycin ppx   Pt requests to be re-started on HAART  Pt underwent LP for Neuro syphilis and FELICITY virus Negative - VDRL cultures pending - on IV PCN until results known          Essential hypertension    BP controlled currently.  Continue home amlodipine.          PCP Pneumonia withy acute Hypoxemic Resp Failure     cont po Bactrim, prednisone, azithromax  Duonebs.  Blood/Urine cultures show NGTD  Hypoxia resolved    11/21 - PCP on bactrim          VTE Risk Mitigation         Ordered     heparin (porcine) injection 5,000 Units  Every 8 hours     Route:  Subcutaneous        11/21/17 2343     Medium Risk of VTE  Once      11/12/17 2054     Place sequential compression device  Until discontinued      11/12/17 2054              Joe Jacob MD  Department of Hospital Medicine   [facility]

## 2017-11-22 NOTE — SUBJECTIVE & OBJECTIVE
Review of Systems   Constitutional: Positive for activity change and unexpected weight change. Negative for appetite change, chills, diaphoresis, fatigue and fever.   HENT: Negative for congestion, nosebleeds, sore throat and trouble swallowing.    Eyes: Negative for pain, discharge and visual disturbance.   Respiratory: Negative for apnea, cough, chest tightness, shortness of breath, wheezing and stridor.    Cardiovascular: Negative for chest pain, palpitations and leg swelling.   Gastrointestinal: Negative for abdominal distention, abdominal pain, blood in stool, constipation, diarrhea, nausea and vomiting.   Endocrine: Negative for cold intolerance and heat intolerance.   Genitourinary: Negative for difficulty urinating, dysuria, flank pain, frequency and urgency.   Musculoskeletal: Negative for arthralgias, back pain, joint swelling, myalgias, neck pain and neck stiffness.   Skin: Negative for rash and wound.   Allergic/Immunologic: Negative for food allergies and immunocompromised state.   Neurological: Positive for tremors. Negative for dizziness, seizures, syncope, facial asymmetry, weakness, light-headedness and headaches.   Hematological: Negative for adenopathy.   Psychiatric/Behavioral: Positive for confusion and hallucinations. Negative for agitation and behavioral problems. The patient is not nervous/anxious.      Objective:     Vital Signs (Most Recent):  Temp: 97.6 °F (36.4 °C) (11/21/17 1951)  Pulse: (!) 114 (11/21/17 1951)  Resp: 20 (11/21/17 1951)  BP: 123/76 (11/21/17 1951)  SpO2: 98 % (11/21/17 2100) Vital Signs (24h Range):  Temp:  [97.1 °F (36.2 °C)-98 °F (36.7 °C)] 97.6 °F (36.4 °C)  Pulse:  [105-120] 114  Resp:  [18-20] 20  SpO2:  [95 %-100 %] 98 %  BP: (123-145)/(69-86) 123/76     Weight: 90.5 kg (199 lb 8.3 oz)  Body mass index is 26.32 kg/m².    Intake/Output Summary (Last 24 hours) at 11/21/17 2336  Last data filed at 11/21/17 0600   Gross per 24 hour   Intake              359 ml    Output             1700 ml   Net            -1341 ml      Physical Exam   Constitutional: He appears well-developed and well-nourished.   HENT:   Head: Normocephalic and atraumatic.   Nose: Nose normal.   Eyes: Conjunctivae and EOM are normal. Pupils are equal, round, and reactive to light.   Neck: Normal range of motion. Neck supple. No JVD present. No tracheal deviation present. No thyromegaly present.   Cardiovascular: Normal rate, regular rhythm, normal heart sounds and intact distal pulses.  Exam reveals no gallop and no friction rub.    No murmur heard.  Pulmonary/Chest: Effort normal. No stridor. No respiratory distress. He has no wheezes. He has no rales. He exhibits no tenderness.   Abdominal: Soft. Bowel sounds are normal. He exhibits no distension. There is no tenderness. There is no rebound and no guarding.   Musculoskeletal: Normal range of motion. He exhibits no edema, tenderness or deformity.   Neurological: He has normal reflexes. No cranial nerve deficit. Coordination normal.   Skin: Skin is warm and dry. Capillary refill takes less than 2 seconds. No rash noted. No erythema. No pallor.   Tatoos. Stage 3 pressure ulcer to coccyx, burn to mid back   Psychiatric: He has a normal mood and affect. His behavior is normal. Judgment and thought content normal.   Confused, disoriented   Nursing note and vitals reviewed.      Significant Labs: All pertinent labs within the past 24 hours have been reviewed.    Significant Imaging: I have reviewed and interpreted all pertinent imaging results/findings within the past 24 hours.

## 2017-11-22 NOTE — PROGRESS NOTES
[facility] -   Adult Nutrition  Progress Note    SUMMARY     Recommendations    Recommendation/Intervention: 1.Continue current diet. 2. Will continue to monitor.   Goals: Intake of 85 - 100 % of meals by next RD visit  Nutrition Goal Status: goal met  Communication of RD Recs:  (care plan and sticky note)      Reason for Assessment    Reason for Assessment: RD follow up   Diagnosis:  1. Pneumonia due to infectious organism, unspecified laterality, unspecified part of lung    2. Weakness    3. Altered mental status    4. Hypoxia    5. Type 2 diabetes mellitus    6. Pneumonia due to infectious organism    7. Acute on chronic respiratory failure with hypoxia    8. Pneumonia of both lungs due to infectious organism, unspecified part of lung    9. AIDS (acquired immunodeficiency syndrome), CD4 >200 and <500      Past Medical History:   Diagnosis Date    AIDS     Chronic back pain     Chronic hepatitis C     DDD (degenerative disc disease), lumbar     Diabetes mellitus     Hepatitis B antibody positive     History of anal cancer     HIV (human immunodeficiency virus infection)     Hypertension     Neuropathy     Obesity     Syphilis     Vitreous detachment of right eye      General Information Comments: Per Family Patient has good appetite (PO intake ~ 100 %). Will continue to monitor.     Nutrition Discharge Planning: Cardiac Diabetic diet     Nutrition Prescription Ordered    Current Diet Order: Diabetic 1800 Cardiac   Oral supplements: boost plus all meals      Evaluation of Received Nutrients/Fluid Intake          Energy Calories Required: meeting needs      Protein Required:  meeting needs  Fluid Required: meeting needs  % Intake of Estimated Energy Needs: Other: 100 %  % Meal Intake: 100%     Nutrition Risk Screen     Nutrition Risk Screen: no indicators present    Nutrition/Diet History       Typical Food/Fluid Intake: good intake at home  Food Preferences: none reported including  "Orthodoxy or cultural       Labs/Tests/Procedures/Meds       Pertinent Labs Reviewed: reviewed, pertinent      BMP  Lab Results   Component Value Date     (L) 11/22/2017    K 4.5 11/22/2017     11/22/2017    CO2 25 11/22/2017    BUN 17 11/22/2017    CREATININE 0.8 11/22/2017    CALCIUM 10.8 (H) 11/22/2017    ANIONGAP 7 (L) 11/22/2017    ESTGFRAFRICA >60 11/22/2017    EGFRNONAA >60 11/22/2017     Lab Results   Component Value Date    CALCIUM 10.8 (H) 11/22/2017    PHOS 1.9 (L) 11/22/2017     Lab Results   Component Value Date    ALBUMIN 2.3 (L) 11/22/2017       Recent Labs  Lab 11/22/17  0644   POCTGLUCOSE 217*     Lab Results   Component Value Date    HGBA1C 4.9 11/12/2017       Pertinent Medications Reviewed: reviewed       Physical Findings    Overall Physical Appearance: overweight     Oral/Mouth Cavity:  (teeth missing)  Skin:  (Elmer 15, Wound back, pressure ulcer sacral spine stage III)    Anthropometrics    Temp: 98.2 °F (36.8 °C)     Height: 6' 1" (185.4 cm)  Weight Method: Bed Scale  Weight: 91.2 kg (201 lb 1 oz)  Ideal Body Weight (IBW), Male: 184 lb     % Ideal Body Weight, Male (lb): 116.82 lb     BMI (Calculated): 28.4  BMI Grade: 25 - 29.9 - overweight          Estimated/Assessed Needs    Weight Used For Calorie Calculations: 97.5 kg (214 lb 15.2 oz)   Height (cm): 185.4 cm     Energy Need Method: Todd-St Jeor (x1.2 = 2163)     RMR (Todd-St. Jeor Equation): 1803.88     Weight Used For Protein Calculations: 97.5 kg (214 lb 15.2 oz)     1.2 gm Protein (gm): 117.24 and 1.4 gm Protein (gm): 136.79     Fluid Need Method: RDA Method (or per MD)         CHO Requirement: 270g     Assessment and Plan    Pressure ulcer of coccygeal region, stage 3    Nutrition problem  Increased nutrient needs (protein)     Related to (etiology):   Wound healing     Signs and Symptoms (as evidenced by):   Wound posterior back, pressure ulcer sacral spine stage III     Interventions/Recommendations (treatment " strategy):  See above    Nutrition Diagnosis Status:   Continues            Monitor and Evaluation    Food and Nutrient Intake: energy intake, food and beverage intake  Food and Nutrient Adminstration: diet order  Knowledge/Beliefs/Attitudes: beliefs and attitudes  Anthropometric Measurements: weight  Biochemical Data, Medical Tests and Procedures: electrolyte and renal panel, glucose/endocrine profile  Nutrition-Focused Physical Findings: overall appearance, skin    Nutrition Follow-Up    RD Follow-up?: Yes (1xweekly)

## 2017-11-22 NOTE — PT/OT/SLP PROGRESS
Occupational Therapy      Jeffrey Rosales  MRN: [id number]    S: PT COOPERATIVE WITH THERAPY SESSION   O: PT PERFORMED 2 SET X 10 REPS B UE ROM EXERCISE IN ALL AVAILABLE PLANES AND RANGES SEATED IN BEDSIDE CHAIR  WITH 1 .5 DOWEL   A: : PT DEMONSTRATED IMPROVEMENTS WITH B UE STRENGTH/ENDURANCE AS EVIDENCE BY COMPLETING HEP  P: CONTINUE WITH POC  Suzie Chaparro, OT   [phone]  11/22/2017

## 2017-11-22 NOTE — PLAN OF CARE
LAILA received call from Jade with [facility]. Authorization received and patient can be accepted today.      LAILA spoke to Dr Myles and patient will not need IV antibiotics.  Dr Myles will place discharge orders.    LAILA will fax orders to Jade [phone] once received

## 2017-11-22 NOTE — PT/OT/SLP PROGRESS
Physical Therapy  Treatment    Jeffrey Rosales   MRN: [id number]   Admitting Diagnosis: Encephalopathy acute    PT Received On: 11/22/17  PT Start Time: 0930     PT Stop Time: 0955    PT Total Time (min): 25 min       Billable Minutes:  Gait [id number] and Therapeutic Exercise 10    Treatment Type: Treatment  PT/PTA: PT       General Precautions: Standard, fall  Orthopedic Precautions: N/A   Braces: N/A    Subjective:  Communicated with NURSE CAILIN prior to session.  Pain/Comfort  Pain Rating 1: 0/10    Objective:   Patient found with: telemetry    Functional Mobility:  Bed Mobility:   Scooting/Bridging: Contact Guard Assistance    Transfers:  Sit <> Stand Assistance: Minimum Assistance  Sit <> Stand Assistive Device: Rolling Walker  Bed <> Chair Technique: Stand Pivot  Bed <> Chair Assistance: Minimum Assistance  Bed <> Chair Assistive Device: Rolling Walker    Gait:   Gait Distance: PT AMB 40' WITH RW AND MARCELA, SLOW PACED GAIT, CUES FOR UPRIGHT POSTURE DUE TO FLEXED FWD AT HEAD AND TRUNK, CUES TO STAY ON TASK  Assistance 1: Minimum assistance  Gait Assistive Device: Rolling walker  Gait Pattern: swing-through gait  Gait Deviation(s): decreased phillip    Balance:   Static Sit: FAIR  Dynamic Sit: FAIR  Static Stand: POOR+  Dynamic stand:  POOR+    Therapeutic Activities and Exercises:  PT DONNED SHOES WITH SET UP, PT PERFORMED BLE THEREX X 20 REPS AROM    AM-PAC 6 CLICK MOBILITY  How much help from another person does this patient currently need?   1 = Unable, Total/Dependent Assistance  2 = A lot, Maximum/Moderate Assistance  3 = A little, Minimum/Contact Guard/Supervision  4 = None, Modified [place]/Independent    Turning over in bed (including adjusting bedclothes, sheets and blankets)?: 3  Sitting down on and standing up from a chair with arms (e.g., wheelchair, bedside commode, etc.): 3  Moving from lying on back to sitting on the side of the bed?: 3  Moving to and from a bed to a chair (including a  wheelchair)?: 3  Need to walk in hospital room?: 3  Climbing 3-5 steps with a railing?: 1  Total Score: 16    AM-PAC Raw Score CMS G-Code Modifier Level of Impairment Assistance   6 % Total / Unable   7 - 9 CM 80 - 100% Maximal Assist   10 - 14 CL 60 - 80% Moderate Assist   15 - 19 CK 40 - 60% Moderate Assist   20 - 22 CJ 20 - 40% Minimal Assist   23 CI 1-20% SBA / CGA   24 CH 0% Independent/ Mod I     Patient left up in chair with all lines intact, call button in reach, NURSE notified and FAMILY present.    Assessment:  Jeffrey Rosales is a 66 y.o. male with a medical diagnosis of Encephalopathy acute   PT WILL BENEFIT FROM CONT. SKILLED P.T. TO ADDRESS IMPAIRMENTS    Rehab identified problem list/impairments: Rehab identified problem list/impairments: impaired endurance, impaired functional mobilty, gait instability, impaired balance, decreased coordination, decreased safety awareness    Rehab potential is good.    Activity tolerance: Good    Discharge recommendations: Discharge Facility/Level Of Care Needs: nursing facility, skilled, home health PT     Barriers to discharge: Barriers to Discharge: Decreased caregiver support, Inaccessible home environment    Equipment recommendations: Equipment Needed After Discharge: walker, rolling, bath bench     GOALS:    Physical Therapy Goals        Problem: Physical Therapy Goal    Goal Priority Disciplines Outcome Goal Variances Interventions   Physical Therapy Goal     PT/OT, PT      Description:  LTG'S TO BE MET IN 7 DAYS (11-29-17)  1. PT WILL REQUIRE SBA FOR BED MOBILITY  2. PT WILL REQUIRE CGA FOR TF'S  3. PT WILL ' WITH RW AND MARCELA  4. PT WILL TOLERATE BLE THEREX X 20 REPS AROM  5. PT WILL DEMO F- DYNAMIC BALANCE DURING GAIT                      PLAN:    Patient to be seen 5 x/week  to address the above listed problems via gait training, therapeutic activities, therapeutic exercises  Plan of Care expires: 11/29/17  Plan of Care reviewed with: patient,  spouse    PT ENCOURAGED TO CALL FOR ASSISTANCE WITH ALL NEEDS DUE TO FALL RISK STATUS, PT AGREEABLE    Ashanti Joaquin, PT  11/22/2017

## 2017-11-22 NOTE — PLAN OF CARE
Problem: Patient Care Overview  Goal: Plan of Care Review  Outcome: Ongoing (interventions implemented as appropriate)  Shift assessment completed. Patient updated on POC for the day, denies pain, sob. IVF infusing: Penicillin 20.8ml/hr. Neuro: confused. CVR: Continuous telemetry monitoring maintained, patient tachycardic. O2 @ 2L , Sats=WDL. Patient voids into wash basin. Ms:generalized weakness. Reviewed fall precautions and hourly rounding with patient. Family at the bedside. Patient refused hydrocortisone suppository. Will continue to monitor.

## 2017-11-22 NOTE — ASSESSMENT & PLAN NOTE
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    +RPR  Dr. López recommended treatment for neurosyphilis with IV PCN until results are known - may be discharged on IV PCN and follow up with ID. SNF will not accept continuous IV ABx. Will need LTAC for continuous ABx.     11/21 - as above and awaiting VDRL from CSF prior to dispo

## 2017-11-22 NOTE — PLAN OF CARE
DC orders faxed to J[facility].  Number for report [phone].  Transportation on the way to hospital    @2540 packet given to primary nurse Armida.  Family made aware of transfer to [place].

## 2017-11-22 NOTE — PLAN OF CARE
Problem: Patient Care Overview  Goal: Plan of Care Review  Outcome: Ongoing (interventions implemented as appropriate)  SLOW DAILY IMPROVEMENTS, MARCELA FOR TF'S AND GAIT USING RW

## 2017-11-23 NOTE — PLAN OF CARE
Discharged to [facility]     11/23/17 0955   Final Note   Assessment Type Final Discharge Note   Discharge Disposition SNF   Right Care Referral Info   Post Acute Recommendation SNF / Sub-Acute Rehab   Facility Name [place]

## 2017-12-08 PROBLEM — E87.1 HYPONATREMIA: Status: ACTIVE | Noted: 2017-01-01

## 2017-12-08 PROBLEM — E83.52 HYPERCALCEMIA: Status: ACTIVE | Noted: 2017-01-01

## 2017-12-08 PROBLEM — K65.2 SBP (SPONTANEOUS BACTERIAL PERITONITIS): Status: ACTIVE | Noted: 2017-01-01

## 2017-12-08 PROBLEM — G93.40 ENCEPHALOPATHY ACUTE: Status: ACTIVE | Noted: 2017-01-01

## 2017-12-08 PROBLEM — E87.5 HYPERKALEMIA: Status: ACTIVE | Noted: 2017-01-01

## 2017-12-08 NOTE — H&P
Sterile technique was performed in the RLQ, lidocaine was used as a local anesthetic.  25 gauge needle used due to patients 34,000 platelet count.  20 ccs of light anastasiya fluid removed for diagnostic purposes.  Pt tolerated the procedure well without immediate complications.  Please see radiologist report for details. F/u with PCP and/or ordering physician.

## 2017-12-08 NOTE — H&P
"[facility] Hospital Medicine  History & Physical    Patient Name: Jeffrey Rosales  MRN: [id number]  Admission Date: 12/8/2017  Attending Physician:   Primary Care Provider: Provider [employer]         Patient information was obtained from patient and ER records.     Subjective:     Principal Problem:Encephalopathy acute    Chief Complaint:   Chief Complaint   Patient presents with    Altered Mental Status     pt sent from [facility], refusing meds, not eating, refusing therapy        HPI: Mr. Rosales is a 65 yo male  with  HIV/AIDS, HCV cirrhosis, HTN, HLD, DM II, chronic back pain, PUD, and h/o anal cancer who presented to ED from [facility] with AMS and generalized weakness. The pt's family reports over the last 2 days, the pt had increasing confusion, agitation, and hallucinations. He also was cursing at staff and threatening to "punch" his son which is very out of character. Also, the family reports pt was refusing medications or eating.   Discharged 11/23/17 to SNF from hospital for PCP pneumonia. Had lumbar puncture during hospitalization for hallucinations and FELICITY virus and neuro syphilis ruled out       Past Medical History:   Diagnosis Date    AIDS     Chronic back pain     Chronic hepatitis C     DDD (degenerative disc disease), lumbar     Diabetes mellitus     Hepatitis B antibody positive     History of anal cancer     HIV (human immunodeficiency virus infection)     Hypertension     Neuropathy     Obesity     Syphilis     Vitreous detachment of right eye        Past Surgical History:   Procedure Laterality Date    anal biopsy      COLONOSCOPY W/ BIOPSIES      ESOPHAGOGASTRODUODENOSCOPY      EYE SURGERY      MEDIPORT INSERTION, SINGLE      MEDIPORT REMOVAL         Review of patient's allergies indicates:  No Known Allergies    No current facility-administered medications on file prior to encounter.      Current Outpatient Prescriptions on File " Prior to Encounter   Medication Sig    albuterol-ipratropium 2.5mg-0.5mg/3mL (DUO-NEB) 0.5 mg-3 mg(2.5 mg base)/3 mL nebulizer solution Take 3 mLs by nebulization every 6 (six) hours while awake. Rescue    amLODIPine (NORVASC) 5 MG tablet Take 5 mg by mouth once daily.     aspirin (ECOTRIN) 81 MG EC tablet Take 81 mg by mouth once daily.     azithromycin 200 mg/5 ml (ZITHROMAX) 200 mg/5 mL suspension Take 30 mLs (1,200 mg total) by mouth once a week. (Patient taking differently: Take 1,200 mg by mouth every Wednesday. )    cyclobenzaprine (FLEXERIL) 10 MG tablet Take 10 mg by mouth once daily.     dolutegravir 50 mg Tab Take 1 tablet (50 mg total) by mouth once daily.    emtricitabine-tenofovir 200-300 mg (TRUVADA) 200-300 mg Tab Take 1 tablet by mouth once daily.    gabapentin (NEURONTIN) 800 MG tablet Take 800 mg by mouth once daily.     insulin detemir (LEVEMIR FLEXTOUCH) 100 unit/mL (3 mL) SubQ InPn pen Inject 10 Units into the skin 2 (two) times daily.    insulin NPH-insulin regular, 70/30, (NOVOLIN 70/30) 100 unit/mL (70-30) injection Inject subcutaneously 60 units in the morning and 40 units in the evening.    nortriptyline (PAMELOR) 10 MG capsule TAKE 2 (10mg) CAPS BY MOUTH AT BEDTIME    omeprazole (PRILOSEC) 40 MG capsule TAKE 1 CAPSULE BY MOUTH DAILY.    pravastatin (PRAVACHOL) 20 MG tablet TAKE 1 TABLET BY MOUTH DAILY.    predniSONE (DELTASONE) 20 MG tablet Take 40mg oral twice daily for 5 days, 40mg oral daily x 5 days, then 20mg oral daily for 11 days then discontinue    sulfamethoxazole-trimethoprim 400-80mg (BACTRIM,SEPTRA) 400-80 mg per tablet Take 2 tablets by mouth 3 (three) times daily.    mv-min-folic acid-lutein 500-250 mcg Chew Take 1 tablet by mouth.     Family History     Problem Relation (Age of Onset)    Diabetes Sister, Brother    No Known Problems Mother, Father        Social History Main Topics    Smoking status: Former Smoker     Quit date: 1999    Smokeless tobacco:  Never Used    Alcohol use Yes      Comment: occasional    Drug use: No    Sexual activity: No     Review of Systems  Objective:     Vital Signs (Most Recent):  Temp: 97.6 °F (36.4 °C) (12/08/17 1133)  Pulse: 107 (12/08/17 1453)  Resp: 19 (12/08/17 1453)  BP: (!) 144/89 (12/08/17 1453)  SpO2: 97 % (12/08/17 1453) Vital Signs (24h Range):  Temp:  [97.6 °F (36.4 °C)] 97.6 °F (36.4 °C)  Pulse:  [107-120] 107  Resp:  [16-21] 19  SpO2:  [97 %-99 %] 97 %  BP: (129-144)/(84-89) 144/89     Weight: 93.2 kg (205 lb 6.4 oz)  Body mass index is 27.1 kg/m².    Physical Exam   Constitutional: He appears well-developed and well-nourished. No distress.   Drowsy    HENT:   Head: Normocephalic and atraumatic.   Nose: Nose normal.   Mouth/Throat: Oropharynx is clear and moist.   Eyes: Conjunctivae are normal. Scleral icterus (mild ) is present.   Neck: Normal range of motion. Neck supple.   Cardiovascular: Normal rate, regular rhythm, normal heart sounds and intact distal pulses.  Exam reveals no gallop and no friction rub.    No murmur heard.  Pulmonary/Chest: Effort normal. No stridor. No respiratory distress. He has no wheezes. He has no rales. He exhibits no tenderness.   Diminished    Abdominal: Soft. Bowel sounds are normal. He exhibits distension (Mildly distended). There is tenderness (generalized TTP ). There is no rebound and no guarding.   Musculoskeletal: Normal range of motion. He exhibits edema (+1-2 edema ). He exhibits no tenderness or deformity.   Neurological: He is alert. He has normal reflexes. No cranial nerve deficit. Coordination normal.   Oriented to self only, Drowsy   Unable to sit up unassisted  Does not follow command consistently      Skin: Skin is warm and dry. No rash noted. He is not diaphoretic. No erythema. No pallor.   Psychiatric: He has a normal mood and affect. His behavior is normal. Thought content normal.   Nursing note and vitals reviewed.          Significant Labs:   BMP:   Recent Labs  Lab  12/08/17  1225   *   *   K 5.7*      CO2 20*   BUN 31*   CREATININE 1.1   CALCIUM 10.9*   MG 2.3     CBC:   Recent Labs  Lab 12/08/17  1225   WBC 3.33*   HGB 15.3   HCT 44.8   PLT 34*     CMP:   Recent Labs  Lab 12/08/17  1225   *   K 5.7*      CO2 20*   *   BUN 31*   CREATININE 1.1   CALCIUM 10.9*   PROT 6.3   ALBUMIN 2.0*   BILITOT 3.2*   ALKPHOS 331*   *   *   ANIONGAP 7*   EGFRNONAA >60     All pertinent labs within the past 24 hours have been reviewed.    Significant Imaging:   Imaging Results          US Guided Paracentesis (Final result)  Result time 12/08/17 16:51:40    Final result by Wilberto Lin III, MD (12/08/17 16:51:40)                 Impression:        1. successful diagnostic paracentesis with removal of 20 cc of ascites.      Electronically signed by: WILBERTO LIN MD  Date:     12/08/17  Time:    16:51              Narrative:    Ultrasound guided paracentesis.    Type indication: HIV. Abdominal pain.    Consent was obtained prior to the procedure.    The procedure was performed in conjunction with case [place]      Ultrasound showed a moderate volume of ascites. It was elected to utilize the right lower quadrant. The patient had a markedly low platelet count therefore only diagnostic paracentesis was performed with a 25-gauge needle.    The skin was prepped and draped in sterile fashion. 1% lidocaine without epinephrine was utilized for local anesthesia. A 25-gauge needle was advanced without difficulty into the peritoneal cavity. A total of 20 cc of light anastasiya colored fluid was removed for diagnostic purposes. The patient tolerated the procedure well without any immediate complications.                             CT Renal Stone Study ABD Pelvis WO (Edited Result - FINAL)  Result time 12/08/17 13:50:41    Addendum 1 of 1 by Wilberto Lin III, MD (12/08/17 13:50:41)    .     All CT scans at this facility use dose modulation,  iterative reconstruction, and/or weight based dosing when appropriate to reduce radiation dose to as low as reasonably achievable.      Electronically signed by: WILBERTO PACK MD  Date:     12/08/17  Time:    13:50                Final result by Wilberto Pack III, MD (12/08/17 13:49:44)                 Impression:        1. Moderate volume of ascites.    2. Minimal pleural effusions with basilar infiltrates and/or atelectatic changes. Small noncalcified pulmonary nodule right base measures 1.3 cm in diameter.    3. Otherwise as above..       Electronically signed by: WILBERTO PACK MD  Date:     12/08/17  Time:    13:49              Narrative:    CT of the abdomen and pelvis without contrast.    Clinical indication: Abdominal pain.    No prior studies for comparison.    There are minimal pleural effusions, greater on the right with mild left perihilar/infrahilar and right basilar atelectatic change. Noncalcified nodule suggested posteriorly at the right base measuring approximately 1.3 cm in diameter.    Within the abdomen or pelvis there is no free intraperitoneal air or bowel obstruction. Bony windows show no acute abnormality. Degenerative changes noted, greatest in the lower lumbar spine.    Heart size is normal. There is a small pericardial effusion.    Within the upper abdomen the liver and spleen are not enlarged. There is no adrenal or pancreatic mass or enlargement. There is no solid renal mass or obstruction. There is atheromatous change along the aorta without gross evidence of aneurysm formation.    There is a moderate volume of ascites with fluid displacing the liver and spleen somewhat medially. Cannot exclude a small hiatal hernia. Exam limited by the lack of oral contrast.    Within the lower abdomen and pelvis, there is again evidence of a moderate volume of fluid in the pelvis. Bladder is unremarkable. Contrast with stool noted in the rectosigmoid. No gross abnormality in the right  lower quadrant but suggest acute appendicitis.                             CT Head Without Contrast (Final result)  Result time 12/08/17 13:13:21    Final result by Wilberto Lin III, MD (12/08/17 13:13:21)                 Impression:     Stable CT brain. Atrophy with bilateral white matter changes most likely related to microvascular disease. No bleed or other gross acute abnormality suggested.      All CT scans at this facility use dose modulation, iterative reconstruction, and/or weight based dosing when appropriate to reduce radiation dose to as low as reasonably achievable.      Electronically signed by: WILBERTO LIN MD  Date:     12/08/17  Time:    13:13              Narrative:    CT of the head without contrast.    Clinical indication: ams.Altered mental status    Standard noncontrast CT scan of the brain. Compared to November.      The brain and ventricles again show generalized changes of atrophy, both supra-and infratentorial. There are low-attenuation changes in the periventricular and subcortical white matter. No hemorrhage, mass lesion, hydrocephalus, or midline shift. No acute/depressed skull fracture. Scan is degraded by motion artifact.                             X-Ray Chest 1 View (Final result)  Result time 12/08/17 12:26:13    Final result by Wilberto Lin III, MD (12/08/17 12:26:13)                 Impression:         Negative one view chest x-ray.      Electronically signed by: WILBERTO LIN MD  Date:     12/08/17  Time:    12:26              Narrative:    One view chest x-ray.    Clinical indication: Weakness.    Heart size is normal. The lung fields are clear. No acute pulmonary infiltrate.                            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

## 2017-12-08 NOTE — SUBJECTIVE & OBJECTIVE
Past Medical History:   Diagnosis Date    AIDS     Chronic back pain     Chronic hepatitis C     DDD (degenerative disc disease), lumbar     Diabetes mellitus     Hepatitis B antibody positive     History of anal cancer     HIV (human immunodeficiency virus infection)     Hypertension     Neuropathy     Obesity     Syphilis     Vitreous detachment of right eye        Past Surgical History:   Procedure Laterality Date    anal biopsy      COLONOSCOPY W/ BIOPSIES      ESOPHAGOGASTRODUODENOSCOPY      EYE SURGERY      MEDIPORT INSERTION, SINGLE      MEDIPORT REMOVAL         Review of patient's allergies indicates:  No Known Allergies    No current facility-administered medications on file prior to encounter.      Current Outpatient Prescriptions on File Prior to Encounter   Medication Sig    albuterol-ipratropium 2.5mg-0.5mg/3mL (DUO-NEB) 0.5 mg-3 mg(2.5 mg base)/3 mL nebulizer solution Take 3 mLs by nebulization every 6 (six) hours while awake. Rescue    amLODIPine (NORVASC) 5 MG tablet Take 5 mg by mouth once daily.     aspirin (ECOTRIN) 81 MG EC tablet Take 81 mg by mouth once daily.     azithromycin 200 mg/5 ml (ZITHROMAX) 200 mg/5 mL suspension Take 30 mLs (1,200 mg total) by mouth once a week. (Patient taking differently: Take 1,200 mg by mouth every Wednesday. )    cyclobenzaprine (FLEXERIL) 10 MG tablet Take 10 mg by mouth once daily.     dolutegravir 50 mg Tab Take 1 tablet (50 mg total) by mouth once daily.    emtricitabine-tenofovir 200-300 mg (TRUVADA) 200-300 mg Tab Take 1 tablet by mouth once daily.    gabapentin (NEURONTIN) 800 MG tablet Take 800 mg by mouth once daily.     insulin detemir (LEVEMIR FLEXTOUCH) 100 unit/mL (3 mL) SubQ InPn pen Inject 10 Units into the skin 2 (two) times daily.    insulin NPH-insulin regular, 70/30, (NOVOLIN 70/30) 100 unit/mL (70-30) injection Inject subcutaneously 60 units in the morning and 40 units in the evening.    nortriptyline (PAMELOR)  10 MG capsule TAKE 2 (10mg) CAPS BY MOUTH AT BEDTIME    omeprazole (PRILOSEC) 40 MG capsule TAKE 1 CAPSULE BY MOUTH DAILY.    pravastatin (PRAVACHOL) 20 MG tablet TAKE 1 TABLET BY MOUTH DAILY.    predniSONE (DELTASONE) 20 MG tablet Take 40mg oral twice daily for 5 days, 40mg oral daily x 5 days, then 20mg oral daily for 11 days then discontinue    sulfamethoxazole-trimethoprim 400-80mg (BACTRIM,SEPTRA) 400-80 mg per tablet Take 2 tablets by mouth 3 (three) times daily.    mv-min-folic acid-lutein 500-250 mcg Chew Take 1 tablet by mouth.     Family History     Problem Relation (Age of Onset)    Diabetes Sister, Brother    No Known Problems Mother, Father        Social History Main Topics    Smoking status: Former Smoker     Quit date: 1999    Smokeless tobacco: Never Used    Alcohol use Yes      Comment: occasional    Drug use: No    Sexual activity: No     Review of Systems  Objective:     Vital Signs (Most Recent):  Temp: 97.6 °F (36.4 °C) (12/08/17 1133)  Pulse: 107 (12/08/17 1453)  Resp: 19 (12/08/17 1453)  BP: (!) 144/89 (12/08/17 1453)  SpO2: 97 % (12/08/17 1453) Vital Signs (24h Range):  Temp:  [97.6 °F (36.4 °C)] 97.6 °F (36.4 °C)  Pulse:  [107-120] 107  Resp:  [16-21] 19  SpO2:  [97 %-99 %] 97 %  BP: (129-144)/(84-89) 144/89     Weight: 93.2 kg (205 lb 6.4 oz)  Body mass index is 27.1 kg/m².    Physical Exam   Constitutional: He appears well-developed and well-nourished. No distress.   Drowsy    HENT:   Head: Normocephalic and atraumatic.   Nose: Nose normal.   Mouth/Throat: Oropharynx is clear and moist.   Eyes: Conjunctivae are normal. Scleral icterus (mild ) is present.   Neck: Normal range of motion. Neck supple.   Cardiovascular: Normal rate, regular rhythm, normal heart sounds and intact distal pulses.  Exam reveals no gallop and no friction rub.    No murmur heard.  Pulmonary/Chest: Effort normal. No stridor. No respiratory distress. He has no wheezes. He has no rales. He exhibits no  tenderness.   Diminished    Abdominal: Soft. Bowel sounds are normal. He exhibits distension (Mildly distended). There is tenderness (generalized TTP ). There is no rebound and no guarding.   Musculoskeletal: Normal range of motion. He exhibits edema (+1-2 edema ). He exhibits no tenderness or deformity.   Neurological: He is alert. He has normal reflexes. No cranial nerve deficit. Coordination normal.   Oriented to self only, Drowsy   Unable to sit up unassisted  Does not follow command consistently      Skin: Skin is warm and dry. No rash noted. He is not diaphoretic. No erythema. No pallor.   Psychiatric: He has a normal mood and affect. His behavior is normal. Thought content normal.   Nursing note and vitals reviewed.          Significant Labs:   BMP:   Recent Labs  Lab 12/08/17  1225   *   *   K 5.7*      CO2 20*   BUN 31*   CREATININE 1.1   CALCIUM 10.9*   MG 2.3     CBC:   Recent Labs  Lab 12/08/17  1225   WBC 3.33*   HGB 15.3   HCT 44.8   PLT 34*     CMP:   Recent Labs  Lab 12/08/17  1225   *   K 5.7*      CO2 20*   *   BUN 31*   CREATININE 1.1   CALCIUM 10.9*   PROT 6.3   ALBUMIN 2.0*   BILITOT 3.2*   ALKPHOS 331*   *   *   ANIONGAP 7*   EGFRNONAA >60     All pertinent labs within the past 24 hours have been reviewed.    Significant Imaging:   Imaging Results          US Guided Paracentesis (Final result)  Result time 12/08/17 16:51:40    Final result by Wilberto Lin III, MD (12/08/17 16:51:40)                 Impression:        1. successful diagnostic paracentesis with removal of 20 cc of ascites.      Electronically signed by: WILBERTO LIN MD  Date:     12/08/17  Time:    16:51              Narrative:    Ultrasound guided paracentesis.    Type indication: HIV. Abdominal pain.    Consent was obtained prior to the procedure.    The procedure was performed in conjunction with case [place]      Ultrasound showed a moderate volume of ascites.  It was elected to utilize the right lower quadrant. The patient had a markedly low platelet count therefore only diagnostic paracentesis was performed with a 25-gauge needle.    The skin was prepped and draped in sterile fashion. 1% lidocaine without epinephrine was utilized for local anesthesia. A 25-gauge needle was advanced without difficulty into the peritoneal cavity. A total of 20 cc of light anastasiya colored fluid was removed for diagnostic purposes. The patient tolerated the procedure well without any immediate complications.                             CT Renal Stone Study ABD Pelvis WO (Edited Result - FINAL)  Result time 12/08/17 13:50:41    Addendum 1 of 1 by Wilberto Lin III, MD (12/08/17 13:50:41)    .     All CT scans at this facility use dose modulation, iterative reconstruction, and/or weight based dosing when appropriate to reduce radiation dose to as low as reasonably achievable.      Electronically signed by: WILBERTO LIN MD  Date:     12/08/17  Time:    13:50                Final result by Wilberto Lin III, MD (12/08/17 13:49:44)                 Impression:        1. Moderate volume of ascites.    2. Minimal pleural effusions with basilar infiltrates and/or atelectatic changes. Small noncalcified pulmonary nodule right base measures 1.3 cm in diameter.    3. Otherwise as above..       Electronically signed by: WILBERTO LIN MD  Date:     12/08/17  Time:    13:49              Narrative:    CT of the abdomen and pelvis without contrast.    Clinical indication: Abdominal pain.    No prior studies for comparison.    There are minimal pleural effusions, greater on the right with mild left perihilar/infrahilar and right basilar atelectatic change. Noncalcified nodule suggested posteriorly at the right base measuring approximately 1.3 cm in diameter.    Within the abdomen or pelvis there is no free intraperitoneal air or bowel obstruction. Bony windows show no acute abnormality.  Degenerative changes noted, greatest in the lower lumbar spine.    Heart size is normal. There is a small pericardial effusion.    Within the upper abdomen the liver and spleen are not enlarged. There is no adrenal or pancreatic mass or enlargement. There is no solid renal mass or obstruction. There is atheromatous change along the aorta without gross evidence of aneurysm formation.    There is a moderate volume of ascites with fluid displacing the liver and spleen somewhat medially. Cannot exclude a small hiatal hernia. Exam limited by the lack of oral contrast.    Within the lower abdomen and pelvis, there is again evidence of a moderate volume of fluid in the pelvis. Bladder is unremarkable. Contrast with stool noted in the rectosigmoid. No gross abnormality in the right lower quadrant but suggest acute appendicitis.                             CT Head Without Contrast (Final result)  Result time 12/08/17 13:13:21    Final result by Wilberto Lin III, MD (12/08/17 13:13:21)                 Impression:     Stable CT brain. Atrophy with bilateral white matter changes most likely related to microvascular disease. No bleed or other gross acute abnormality suggested.      All CT scans at this facility use dose modulation, iterative reconstruction, and/or weight based dosing when appropriate to reduce radiation dose to as low as reasonably achievable.      Electronically signed by: WILBERTO LIN MD  Date:     12/08/17  Time:    13:13              Narrative:    CT of the head without contrast.    Clinical indication: ams.Altered mental status    Standard noncontrast CT scan of the brain. Compared to November.      The brain and ventricles again show generalized changes of atrophy, both supra-and infratentorial. There are low-attenuation changes in the periventricular and subcortical white matter. No hemorrhage, mass lesion, hydrocephalus, or midline shift. No acute/depressed skull fracture. Scan is degraded  by motion artifact.                             X-Ray Chest 1 View (Final result)  Result time 12/08/17 12:26:13    Final result by Wilberto Lin III, MD (12/08/17 12:26:13)                 Impression:         Negative one view chest x-ray.      Electronically signed by: WILBERTO LIN MD  Date:     12/08/17  Time:    12:26              Narrative:    One view chest x-ray.    Clinical indication: Weakness.    Heart size is normal. The lung fields are clear. No acute pulmonary infiltrate.

## 2017-12-08 NOTE — HPI
"Mr. Rosales is a 65 yo male  with HIV/AIDS, HCV cirrhosis, HTN, HLD, DM II, chronic back pain, PUD, and h/o anal cancer who presented to ED from [facility] with AMS and generalized weakness. The pt's family reports over the last 2 days, the pt had increasing confusion, agitation, and hallucinations. He also was cursing at staff and threatening to "punch" his son which is very out of character. Also, the family reports pt was refusing medications or eating.   Discharged 11/23/17 to SNF from hospital for PCP pneumonia. Had lumbar puncture during hospitalization for hallucinations and FELICITY virus and neuro syphilis ruled out     "

## 2017-12-08 NOTE — ASSESSMENT & PLAN NOTE
Acute hepatic encephalopathy - may have metabolic encephalopathy as well  Had a recent Lumbar puncture -negative for VDRL and FELICITY virus  Lactulose  GI consult

## 2017-12-08 NOTE — ED PROVIDER NOTES
"SCRIBE #1 NOTE: I, Corinne Mack, am scribing for, and in the presence of, Jacqueline Mix MD. I have scribed the entire note.      History      Chief Complaint   Patient presents with    Altered Mental Status     pt sent from [facility], refusing meds, not eating, refusing therapy       Review of patient's allergies indicates:  No Known Allergies     HPI   HPI    12/8/2017, 11:58 AM   History obtained from the patient's daughter      History of Present Illness: Jeffrey Rosales is a 66 y.o. male patient who presents to the Emergency Department for AMS which onset gradually a few days ago. Symptoms are constant and moderate in severity. Per the daughter, pt has been refusing to eat and refusing to take his medications. Pt's daughter states that she feels the staff at  "have not been treating him well" which may be why the pt has become noncompliant. No prior Tx reported. Pt has Hx of AIDS, Hep C, DM, Hep B, HTN, and syphilis. No further complaints or concerns at this time. HPI limited due to pt AMS.        Arrival mode: EMS    PCP: Provider [facility]       Past Medical History:  Past Medical History:   Diagnosis Date    AIDS     Chronic back pain     Chronic hepatitis C     DDD (degenerative disc disease), lumbar     Diabetes mellitus     Hepatitis B antibody positive     History of anal cancer     HIV (human immunodeficiency virus infection)     Hypertension     Neuropathy     Obesity     Syphilis     Vitreous detachment of right eye        Past Surgical History:  Past Surgical History:   Procedure Laterality Date    anal biopsy      COLONOSCOPY W/ BIOPSIES      ESOPHAGOGASTRODUODENOSCOPY      EYE SURGERY      MEDIPORT INSERTION, SINGLE      MEDIPORT REMOVAL           Family History:  Family History   Problem Relation Age of Onset    No Known Problems Mother     No Known Problems Father     Diabetes Sister     Diabetes Brother        Social History:  Social History "     Social History Main Topics    Smoking status: Former Smoker     Quit date: 1999    Smokeless tobacco: Never Used    Alcohol use Yes      Comment: occasional    Drug use: No    Sexual activity: No       ROS   Review of Systems   Unable to perform ROS: Mental status change     Physical Exam      Initial Vitals [12/08/17 1133]   BP Pulse Resp Temp SpO2   129/86 (!) 120 16 97.6 °F (36.4 °C) 98 %      MAP       100.33          Physical Exam  Nursing Notes and Vital Signs Reviewed.  Constitutional: Patient is in no apparent distress. Well-developed and well-nourished.  Head: Atraumatic. Normocephalic.  Eyes: PERRL. EOM intact. Conjunctivae are not pale. No scleral icterus.   Neck: Supple. Full ROM.   Cardiovascular: Regular rate. Regular rhythm. No murmurs, rubs, or gallops. Distal pulses are 2+ and symmetric.  Pulmonary/Chest: No respiratory distress. Clear to auscultation bilaterally. No wheezing or rales.  Abdominal: Soft and distended.  There is no tenderness.  No rebound, guarding, or rigidity.   Musculoskeletal: Moves all extremities. No obvious deformities. No edema.   Skin: Warm and dry.  Neurological: Pt is oriented to person, but not place or time. Pt is drowsy but arousable to noise.  AMS.  Psychiatric: Normal affect. Good eye contact. Appropriate in content.    ED Course    Critical Care  Date/Time: 12/8/2017 1:49 PM  Performed by: SANTIAGO TILLEY  Authorized by: SANTIAGO TILLEY   Direct patient critical care time: 13 minutes  Additional history critical care time: 4 minutes  Ordering / reviewing critical care time: 8 minutes  Documentation critical care time: 8 minutes  Consulting other physicians critical care time: 10 minutes  Total critical care time (exclusive of procedural time) : 43 minutes  Critical care time was exclusive of teaching time and separately billable procedures and treating other patients.  Critical care was necessary to treat or prevent imminent or life-threatening  deterioration of the following conditions: Encephalopathy; Hyperammonmia.  Critical care was time spent personally by me on the following activities: development of treatment plan with patient or surrogate, discussions with consultants, interpretation of cardiac output measurements, evaluation of patient's response to treatment, examination of patient, obtaining history from patient or surrogate, ordering and performing treatments and interventions, ordering and review of laboratory studies, ordering and review of radiographic studies, pulse oximetry, re-evaluation of patient's condition and review of old charts.        ED Vital Signs:  Vitals:    12/08/17 1133 12/08/17 1143 12/08/17 1155   BP: 129/86 (!) 141/84    Pulse: (!) 120 (!) 114 (!) 113   Resp: 16 20    Temp: 97.6 °F (36.4 °C)     TempSrc: Oral     SpO2: 98% 98%        Abnormal Lab Results:  Labs Reviewed   CBC W/ AUTO DIFFERENTIAL - Abnormal; Notable for the following:        Result Value    WBC 3.33 (*)     MCH 31.9 (*)     RDW 17.5 (*)     Platelets 34 (*)     Platelet Estimate Decreased (*)     All other components within normal limits    Narrative:      Platelet  critical result(s) called and verbal readback obtained   from Liz Sandra RN, 12/08/2017 13:06   COMPREHENSIVE METABOLIC PANEL - Abnormal; Notable for the following:     Sodium 133 (*)     Potassium 5.7 (*)     CO2 20 (*)     Glucose 111 (*)     BUN, Bld 31 (*)     Calcium 10.9 (*)     Albumin 2.0 (*)     Total Bilirubin 3.2 (*)     Alkaline Phosphatase 331 (*)      (*)      (*)     Anion Gap 7 (*)     All other components within normal limits   AMMONIA - Abnormal; Notable for the following:     Ammonia 86 (*)     All other components within normal limits   ACETAMINOPHEN LEVEL - Abnormal; Notable for the following:     Acetaminophen (Tylenol), Serum <3.0 (*)     All other components within normal limits   SALICYLATE LEVEL - Abnormal; Notable for the following:     Salicylate  Lvl <5.0 (*)     All other components within normal limits   MAGNESIUM   CK   TROPONIN I   APTT   PROTIME-INR   LIPASE   LACTIC ACID, PLASMA   B-TYPE NATRIURETIC PEPTIDE   URINALYSIS        All Lab Results:  Results for orders placed or performed during the hospital encounter of 12/08/17   CBC auto differential   Result Value Ref Range    WBC 3.33 (L) 3.90 - 12.70 K/uL    RBC 4.80 4.60 - 6.20 M/uL    Hemoglobin 15.3 14.0 - 18.0 g/dL    Hematocrit 44.8 40.0 - 54.0 %    MCV 93 82 - 98 fL    MCH 31.9 (H) 27.0 - 31.0 pg    MCHC 34.2 32.0 - 36.0 g/dL    RDW 17.5 (H) 11.5 - 14.5 %    Platelets 34 (LL) 150 - 350 K/uL    MPV 9.4 9.2 - 12.9 fL    Platelet Estimate Decreased (A)     Differential Method Automated    Comprehensive metabolic panel   Result Value Ref Range    Sodium 133 (L) 136 - 145 mmol/L    Potassium 5.7 (H) 3.5 - 5.1 mmol/L    Chloride 106 95 - 110 mmol/L    CO2 20 (L) 23 - 29 mmol/L    Glucose 111 (H) 70 - 110 mg/dL    BUN, Bld 31 (H) 8 - 23 mg/dL    Creatinine 1.1 0.5 - 1.4 mg/dL    Calcium 10.9 (H) 8.7 - 10.5 mg/dL    Total Protein 6.3 6.0 - 8.4 g/dL    Albumin 2.0 (L) 3.5 - 5.2 g/dL    Total Bilirubin 3.2 (H) 0.1 - 1.0 mg/dL    Alkaline Phosphatase 331 (H) 55 - 135 U/L     (H) 10 - 40 U/L     (H) 10 - 44 U/L    Anion Gap 7 (L) 8 - 16 mmol/L    eGFR if African American >60 >60 mL/min/1.73 m^2    eGFR if non African American >60 >60 mL/min/1.73 m^2   Magnesium   Result Value Ref Range    Magnesium 2.3 1.6 - 2.6 mg/dL   CK   Result Value Ref Range    CPK 85 20 - 200 U/L   Troponin I   Result Value Ref Range    Troponin I 0.015 0.000 - 0.026 ng/mL   APTT   Result Value Ref Range    aPTT 30.8 21.0 - 32.0 sec   Protime-INR   Result Value Ref Range    Prothrombin Time 12.3 9.0 - 12.5 sec    INR 1.2 0.8 - 1.2   Lipase   Result Value Ref Range    Lipase 32 4 - 60 U/L   Lactic acid, plasma   Result Value Ref Range    Lactate (Lactic Acid) 1.9 0.5 - 2.2 mmol/L   Brain natriuretic peptide   Result Value  Ref Range    BNP 43 0 - 99 pg/mL   Ammonia   Result Value Ref Range    Ammonia 86 (H) 10 - 50 umol/L   Acetaminophen level   Result Value Ref Range    Acetaminophen (Tylenol), Serum <3.0 (L) 10.0 - 20.0 ug/mL   Salicylate level   Result Value Ref Range    Salicylate Lvl <5.0 (L) 15.0 - 30.0 mg/dL       Imaging Results:  Imaging Results          CT Renal Stone Study ABD Pelvis WO (Final result)  Result time 12/08/17 13:49:44    Final result by Wilberto Lin III, MD (12/08/17 13:49:44)                 Impression:        1. Moderate volume of ascites.    2. Minimal pleural effusions with basilar infiltrates and/or atelectatic changes. Small noncalcified pulmonary nodule right base measures 1.3 cm in diameter.    3. Otherwise as above..       Electronically signed by: WILBERTO LIN MD  Date:     12/08/17  Time:    13:49              Narrative:    CT of the abdomen and pelvis without contrast.    Clinical indication: Abdominal pain.    No prior studies for comparison.    There are minimal pleural effusions, greater on the right with mild left perihilar/infrahilar and right basilar atelectatic change. Noncalcified nodule suggested posteriorly at the right base measuring approximately 1.3 cm in diameter.    Within the abdomen or pelvis there is no free intraperitoneal air or bowel obstruction. Bony windows show no acute abnormality. Degenerative changes noted, greatest in the lower lumbar spine.    Heart size is normal. There is a small pericardial effusion.    Within the upper abdomen the liver and spleen are not enlarged. There is no adrenal or pancreatic mass or enlargement. There is no solid renal mass or obstruction. There is atheromatous change along the aorta without gross evidence of aneurysm formation.    There is a moderate volume of ascites with fluid displacing the liver and spleen somewhat medially. Cannot exclude a small hiatal hernia. Exam limited by the lack of oral contrast.    Within the lower  abdomen and pelvis, there is again evidence of a moderate volume of fluid in the pelvis. Bladder is unremarkable. Contrast with stool noted in the rectosigmoid. No gross abnormality in the right lower quadrant but suggest acute appendicitis.                             CT Head Without Contrast (Final result)  Result time 12/08/17 13:13:21    Final result by Wilberto Lin III, MD (12/08/17 13:13:21)                 Impression:     Stable CT brain. Atrophy with bilateral white matter changes most likely related to microvascular disease. No bleed or other gross acute abnormality suggested.      All CT scans at this facility use dose modulation, iterative reconstruction, and/or weight based dosing when appropriate to reduce radiation dose to as low as reasonably achievable.      Electronically signed by: WILBERTO LIN MD  Date:     12/08/17  Time:    13:13              Narrative:    CT of the head without contrast.    Clinical indication: ams.Altered mental status    Standard noncontrast CT scan of the brain. Compared to November.      The brain and ventricles again show generalized changes of atrophy, both supra-and infratentorial. There are low-attenuation changes in the periventricular and subcortical white matter. No hemorrhage, mass lesion, hydrocephalus, or midline shift. No acute/depressed skull fracture. Scan is degraded by motion artifact.                             X-Ray Chest 1 View (Final result)  Result time 12/08/17 12:26:13    Final result by Wilberto Lin III, MD (12/08/17 12:26:13)                 Impression:         Negative one view chest x-ray.      Electronically signed by: WILBERTO LIN MD  Date:     12/08/17  Time:    12:26              Narrative:    One view chest x-ray.    Clinical indication: Weakness.    Heart size is normal. The lung fields are clear. No acute pulmonary infiltrate.                             The EKG was ordered, reviewed, and independently  interpreted by the ED provider.  Interpretation time: 1225  Rate: 113 BPM  Rhythm: sinus tachycardia  Interpretation: L axis deviation. Low voltage QRS. No STEMI.           The Emergency Provider reviewed the vital signs and test results, which are outlined above.    ED Discussion     1:32 PM: Discussed case with OLENA Clements ([facility]). Dr. Jacob agrees with current care and management of pt and accepts admission.   Admitting Service: Hospital medicine   Admitting Physician: Dr. Jacob  Admit to: In-patient Tele    1:48 PM: Re-evaluated pt. I have discussed test results, shared treatment plan, and the need for admission with patient's family. Pt's family expresses understanding at this time and agrees with all information. All questions answered. Pt's family has no further questions or concerns at this time. Pt is ready for admit.      ED Medication(s):  Medications   lorazepam (ATIVAN) injection 0.5 mg (0.5 mg Intravenous Not Given 12/8/17 1300)   lactulose 20 gram/30 mL solution Soln 30 g (not administered)   sodium chloride 0.9% bolus 1,000 mL (1,000 mLs Intravenous New Bag 12/8/17 1231)       New Prescriptions    No medications on file             Medical Decision Making    Medical Decision Making:   Clinical Tests:   Lab Tests: Ordered and Reviewed  Radiological Study: Reviewed and Ordered  Medical Tests: Reviewed and Ordered           Scribe Attestation:   Scribe #1: I performed the above scribed service and the documentation accurately describes the services I performed. I attest to the accuracy of the note.    Attending:   Physician Attestation Statement for Scribe #1: I, Jacqueline Mix MD, personally performed the services described in this documentation, as scribed by Corinne Mack, in my presence, and it is both accurate and complete.          Clinical Impression       ICD-10-CM ICD-9-CM   1. Encephalopathy acute G93.40 348.30   2. Hyperammonemia E72.20 270.6       Disposition:    Disposition: Admitted  Condition: Serious         Jacqueline Mix MD  12/08/17 [id number]

## 2017-12-08 NOTE — ED NOTES
Received call from [place] in [place], pt combative, unwilling to cooperate with test.  Dr. Mix notified, verbal order received for ativan 0.5mg IVP.

## 2017-12-09 PROBLEM — E87.1 HYPONATREMIA: Status: RESOLVED | Noted: 2017-01-01 | Resolved: 2017-01-01

## 2017-12-09 PROBLEM — E87.5 HYPERKALEMIA: Status: RESOLVED | Noted: 2017-01-01 | Resolved: 2017-01-01

## 2017-12-09 PROBLEM — K74.60 CIRRHOSIS OF LIVER WITH ASCITES: Status: ACTIVE | Noted: 2017-01-01

## 2017-12-09 PROBLEM — R74.8 ABNORMAL LIVER ENZYMES: Status: ACTIVE | Noted: 2017-01-01

## 2017-12-09 PROBLEM — K76.82 HEPATIC ENCEPHALOPATHY: Status: ACTIVE | Noted: 2017-01-01

## 2017-12-09 PROBLEM — K65.2 SBP (SPONTANEOUS BACTERIAL PERITONITIS): Status: RESOLVED | Noted: 2017-01-01 | Resolved: 2017-01-01

## 2017-12-09 PROBLEM — R18.8 CIRRHOSIS OF LIVER WITH ASCITES: Status: ACTIVE | Noted: 2017-01-01

## 2017-12-09 NOTE — PROGRESS NOTES
"[facility] Hospital Medicine  Progress Note    Patient Name: Jeffrey Rosales  MRN: [id number]  Patient Class: IP- Inpatient   Admission Date: 12/8/2017  Length of Stay: 1 days  Attending Physician: Tommy Myles MD  Primary Care Provider: Provider [facility]        Subjective:     Principal Problem:Hepatic encephalopathy    HPI:  Mr. Rosales is a 65 yo male  with  HIV/AIDS, HCV cirrhosis, HTN, HLD, DM II, chronic back pain, PUD, and h/o anal cancer who presented to ED from [facility] with AMS and generalized weakness. The pt's family reports over the last 2 days, the pt had increasing confusion, agitation, and hallucinations. He also was cursing at staff and threatening to "punch" his son which is very out of character. Also, the family reports pt was refusing medications or eating.   Discharged 11/23/17 to SNF from hospital for PCP pneumonia. Had lumbar puncture during hospitalization for hallucinations and FELICITY virus and neuro syphilis ruled out       Hospital Course:  Mr. Rosales is a 65 yo male  with HIV/AIDS, HCV cirrhosis, HTN, HLD, DM II, chronic back pain, PUD, and h/o anal cancer who presented to ED from [facility] with AMS and generalized weakness x 2 days. Subsequently admitted and found to have hepatic encephalopathy with Ammonia 89-- treated with Lactulose. Last night also got a dose of Ativan 2 mg IV for agitation and since then the pt has been sleeping and has not taken any of his oral meds today including Lactulose. Family concerned. Pt is arousable with deep stimuli but falls back asleep. Repeat Ammonia was 28. LFts also decreased. GI consulted and his MELD score is 17. He appears to have poor prognosis due to his ES AIDs and Ch Hep C cirrhosis with Ascites but no SBP. Ordered NGT placement to give him Lactulose.    Interval History: sleeping but arousable, no obvious focal neuro deficits, no diarrhea.    Review of Systems   Unable to perform ROS: " Mental status change     Objective:     Vital Signs (Most Recent):  Temp: 98.3 °F (36.8 °C) (12/09/17 1552)  Pulse: 108 (12/09/17 1552)  Resp: 18 (12/09/17 1552)  BP: 128/74 (12/09/17 1552)  SpO2: 95 % (12/09/17 1552) Vital Signs (24h Range):  Temp:  [97.8 °F (36.6 °C)-98.7 °F (37.1 °C)] 98.3 °F (36.8 °C)  Pulse:  [104-120] 108  Resp:  [16-22] 18  SpO2:  [94 %-97 %] 95 %  BP: (128-158)/(69-88) 128/74     Weight: 89.5 kg (197 lb 5 oz)  Body mass index is 26.03 kg/m².    Intake/Output Summary (Last 24 hours) at 12/09/17 1649  Last data filed at 12/09/17 0600   Gross per 24 hour   Intake           701.25 ml   Output                0 ml   Net           701.25 ml      Physical Exam   Constitutional: He appears well-developed and well-nourished. He appears lethargic.   HENT:   Head: Normocephalic and atraumatic.   Eyes: Conjunctivae are normal. Pupils are equal, round, and reactive to light. Scleral icterus is present.   Neck: Normal range of motion. Neck supple. No tracheal deviation present. No thyromegaly present.   Cardiovascular: Normal rate.  An irregular rhythm present.   Pulmonary/Chest: Effort normal and breath sounds normal. He has no wheezes. He has no rales. He exhibits no tenderness.   Abdominal: Soft. Bowel sounds are normal. He exhibits distension. He exhibits no mass. There is no tenderness. There is no guarding.   Musculoskeletal: Normal range of motion. He exhibits edema.   Lymphadenopathy:     He has no cervical adenopathy.   Neurological: He appears lethargic. No cranial nerve deficit.   Skin: Skin is warm and dry. He is not diaphoretic.   Psychiatric: He has a normal mood and affect. His behavior is normal.   Nursing note and vitals reviewed.      Significant Labs:   Blood Culture: No results for input(s): LABBLOO in the last 48 hours.  BMP:   Recent Labs  Lab 12/08/17  1225 12/09/17  0448   * 105   * 134*   K 5.7* 5.0    107   CO2 20* 20*   BUN 31* 29*   CREATININE 1.1 1.1    CALCIUM 10.9* 11.2*   MG 2.3  --      CBC:   Recent Labs  Lab 12/08/17  1225 12/09/17  0448   WBC 3.33* 2.96*   HGB 15.3 13.8*   HCT 44.8 41.6   PLT 34* 32*     CMP:   Recent Labs  Lab 12/08/17  1225 12/09/17  0448   * 134*   K 5.7* 5.0    107   CO2 20* 20*   * 105   BUN 31* 29*   CREATININE 1.1 1.1   CALCIUM 10.9* 11.2*   PROT 6.3 6.3   ALBUMIN 2.0* 2.8*   BILITOT 3.2* 3.0*   ALKPHOS 331* 278*   * 151*   * 177*   ANIONGAP 7* 7*   EGFRNONAA >60 >60     Lactic Acid:   Recent Labs  Lab 12/08/17  1225   LACTATE 1.9     Respiratory Culture:   All pertinent labs within the past 24 hours have been reviewed.    Significant Imaging: I have reviewed all pertinent imaging results/findings within the past 24 hours.    Assessment/Plan:      * Hepatic encephalopathy    Acute hepatic encephalopathy - may have metabolic encephalopathy as well  Had a recent Lumbar puncture -negative for VDRL and FELICITY virus  Lactulose  GI consult     12/9-- Apparently better, Ammonia level down   However still has persistent somnolence likely due to Ativan given for agitation          Cirrhosis of liver with ascites    As above          Decompensated cirrhosis related to hepatitis C virus (HCV)    Discussed with GI   R/O SBP first  See above     Mild Ascites but no Obvious SBP-- MELD 17          AIDS (acquired immunodeficiency syndrome), CD4 <=200    Recently restarted HAART  Consult ID  Cont bactrim     Appears ES AIDS, CD4 only 3  Recommended Hospice to family but daughter (POA) not accepting yet          Recent Hx of PCP Pneumonia withy acute Hypoxemic Resp Failure     Bactrim     Appears stable   Continue present care          Hypercalcemia    Likely secondary to dehydration   IV hydration given  IV albumin               VTE Risk Mitigation         Ordered     Medium Risk of VTE  Once      12/08/17 1843     Reason for No Pharmacological VTE Prophylaxis  Once      12/08/17 1843              Tommy Myles,  MD  Department of Hospital Medicine   [facility]

## 2017-12-09 NOTE — ASSESSMENT & PLAN NOTE
Chronic condition. Patient is not a candidate for liver transplant at this point. Continue monitoring.

## 2017-12-09 NOTE — HPI
Please see below my original consult HPI note.     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 All of his work up has been done at another facility and he is not a candidate for OLTx. He is deteriorating and not much we can offer. He remains somnolent and now has an NG tube placed for medications.       Patient is a 66 year old male who has multiple medical problems including advanced AIDS who last month was admitted with PCP Pneumonia. He was discharged to rehab facility and comes to our ED with mental status changes, somnolent and barely responsive. A CT scan of the abdomen showed small to moderate amount of ascites and a paracentesis was performed yesterday. Total cell count was 300's with a PMN's of 11% (<250) and it is not consistent with SBP. Cultures are pending. I discussed with son and wife in the room and explained the situation. His prognosis is not good.     There is no report of bleeding or GI distress.     The main reasons for Rosita systemic encephalopathy in patient with cirrhosis:  - Infections  - Bleeding  - medications    Patient had EGD and Colonoscopy recently at another facility. I have reviewed his outpatient medications.

## 2017-12-09 NOTE — PLAN OF CARE
Problem: Patient Care Overview  Goal: Plan of Care Review  Outcome: Ongoing (interventions implemented as appropriate)  Patient stable. LR infusing at 75. One time dose of Ativan given for patient pulling at IV line. Sinus tach on the monitor. Blood glucose monitored. Son at bedside. No s/s of distress. Will continue to monitor.

## 2017-12-09 NOTE — ASSESSMENT & PLAN NOTE
Recently restarted HAART  Consult ID  Cont bactrim     Appears ES AIDS, CD4 only 3  Recommended Hospice to family but daughter (POA) not accepting yet

## 2017-12-09 NOTE — SUBJECTIVE & OBJECTIVE
Interval History: sleeping but arousable, no obvious focal neuro deficits, no diarrhea.    Review of Systems   Unable to perform ROS: Mental status change     Objective:     Vital Signs (Most Recent):  Temp: 98.3 °F (36.8 °C) (12/09/17 1552)  Pulse: 108 (12/09/17 1552)  Resp: 18 (12/09/17 1552)  BP: 128/74 (12/09/17 1552)  SpO2: 95 % (12/09/17 1552) Vital Signs (24h Range):  Temp:  [97.8 °F (36.6 °C)-98.7 °F (37.1 °C)] 98.3 °F (36.8 °C)  Pulse:  [104-120] 108  Resp:  [16-22] 18  SpO2:  [94 %-97 %] 95 %  BP: (128-158)/(69-88) 128/74     Weight: 89.5 kg (197 lb 5 oz)  Body mass index is 26.03 kg/m².    Intake/Output Summary (Last 24 hours) at 12/09/17 1649  Last data filed at 12/09/17 0600   Gross per 24 hour   Intake           701.25 ml   Output                0 ml   Net           701.25 ml      Physical Exam   Constitutional: He appears well-developed and well-nourished. He appears lethargic.   HENT:   Head: Normocephalic and atraumatic.   Eyes: Conjunctivae are normal. Pupils are equal, round, and reactive to light. Scleral icterus is present.   Neck: Normal range of motion. Neck supple. No tracheal deviation present. No thyromegaly present.   Cardiovascular: Normal rate.  An irregular rhythm present.   Pulmonary/Chest: Effort normal and breath sounds normal. He has no wheezes. He has no rales. He exhibits no tenderness.   Abdominal: Soft. Bowel sounds are normal. He exhibits distension. He exhibits no mass. There is no tenderness. There is no guarding.   Musculoskeletal: Normal range of motion. He exhibits edema.   Lymphadenopathy:     He has no cervical adenopathy.   Neurological: He appears lethargic. No cranial nerve deficit.   Skin: Skin is warm and dry. He is not diaphoretic.   Psychiatric: He has a normal mood and affect. His behavior is normal.   Nursing note and vitals reviewed.      Significant Labs:   Blood Culture: No results for input(s): LABBLOO in the last 48 hours.  BMP:   Recent Labs  Lab  12/08/17  1225 12/09/17  0448   * 105   * 134*   K 5.7* 5.0    107   CO2 20* 20*   BUN 31* 29*   CREATININE 1.1 1.1   CALCIUM 10.9* 11.2*   MG 2.3  --      CBC:   Recent Labs  Lab 12/08/17  1225 12/09/17  0448   WBC 3.33* 2.96*   HGB 15.3 13.8*   HCT 44.8 41.6   PLT 34* 32*     CMP:   Recent Labs  Lab 12/08/17  1225 12/09/17  0448   * 134*   K 5.7* 5.0    107   CO2 20* 20*   * 105   BUN 31* 29*   CREATININE 1.1 1.1   CALCIUM 10.9* 11.2*   PROT 6.3 6.3   ALBUMIN 2.0* 2.8*   BILITOT 3.2* 3.0*   ALKPHOS 331* 278*   * 151*   * 177*   ANIONGAP 7* 7*   EGFRNONAA >60 >60     Lactic Acid:   Recent Labs  Lab 12/08/17  1225   LACTATE 1.9     Respiratory Culture:   All pertinent labs within the past 24 hours have been reviewed.    Significant Imaging: I have reviewed all pertinent imaging results/findings within the past 24 hours.

## 2017-12-09 NOTE — PLAN OF CARE
Problem: Patient Care Overview  Goal: Plan of Care Review  Outcome: Ongoing (interventions implemented as appropriate)  Patient lethargic, responds to pain, JANNETTE orientation. VS stable. Patient on telemetry, sinus tach on the monitor. Patient on bedrest, turned Q2h. Fall precautions in place. Bed alarm active and audible. Patient free from fall/injury. Plan of care reviewed with patient and family at bedside. Patient and family have no questions at this time. Will continue to monitor.

## 2017-12-09 NOTE — ASSESSMENT & PLAN NOTE
MELD-Na score: 17 at 12/9/2017  4:48 AM  MELD score: 14 at 12/9/2017  4:48 AM  Calculated from:  Serum Creatinine: 1.1 mg/dL at 12/9/2017  4:48 AM  Serum Sodium: 134 mmol/L at 12/9/2017  4:48 AM  Total Bilirubin: 3.0 mg/dL at 12/9/2017  4:48 AM  INR(ratio): 1.2 at 12/9/2017  4:48 AM  Age: 66 years    Lab Results   Component Value Date    MI8HZFEC 3.8 (L) 11/12/2017       Patient with end stage AIDS and chronic liver disease due to chronic Hepatitis C. Continue with lactulose.     Prognosis is guarded.

## 2017-12-09 NOTE — CONSULTS
[facility] -   Gastroenterology  Consult Note    Patient Name: Jeffrey Rosales  MRN: [id number]  Admission Date: 12/8/2017  Hospital Length of Stay: 1 days  Code Status: Full Code   Attending Provider: Tommy Myles MD   Consulting Provider: Jeremy Li MD  Primary Care Physician: Provider Notinsystem  Principal Problem:Hepatic encephalopathy    Inpatient consult to Gastroenterology  Consult performed by: JEREMY LI.  Consult ordered by: SANTIAGO TILLEY  Reason for consult: mental status changes        Subjective:     HPI:  Patient is a 66 year old male who has multiple medical problems including advanced AIDS who last month was admitted with PCP Pneumonia. He was discharged to rehab facility and comes to our ED with mental status changes, somnolent and barely responsive. A CT scan of the abdomen showed small to moderate amount of ascites and a paracentesis was performed yesterday. Total cell count was 300's with a PMN's of 11% (<250) and it is not consistent with SBP. Cultures are pending. I discussed with son and wife in the room and explained the situation. His prognosis is not good.     There is no report of bleeding or GI distress.     The main reasons for Rosita systemic encephalopathy in patient with cirrhosis:  - Infections  - Bleeding  - medications    Patient had EGD and Colonoscopy recently at another facility. I have reviewed his outpatient medications.       Past Medical History:   Diagnosis Date    AIDS     Chronic back pain     Chronic hepatitis C     DDD (degenerative disc disease), lumbar     Diabetes mellitus     Hepatitis B antibody positive     History of anal cancer     HIV (human immunodeficiency virus infection)     Hypertension     Neuropathy     Obesity     Syphilis     Vitreous detachment of right eye        Past Surgical History:   Procedure Laterality Date    anal biopsy      COLONOSCOPY W/ BIOPSIES      ESOPHAGOGASTRODUODENOSCOPY      EYE SURGERY       MEDIPORT INSERTION, SINGLE      MEDIPORT REMOVAL         Review of patient's allergies indicates:  No Known Allergies  Family History     Problem Relation (Age of Onset)    Diabetes Sister, Brother    No Known Problems Mother, Father        Social History Main Topics    Smoking status: Former Smoker     Quit date: 1999    Smokeless tobacco: Never Used    Alcohol use Yes      Comment: occasional    Drug use: No    Sexual activity: No     Review of Systems   Unable to perform ROS: Mental status change     Objective:     Vital Signs (Most Recent):  Temp: 98.3 °F (36.8 °C) (12/09/17 1144)  Pulse: (!) 114 (12/09/17 1213)  Resp: (!) 22 (12/09/17 1213)  BP: (!) 156/84 (12/09/17 1144)  SpO2: 95 % (12/09/17 1213) Vital Signs (24h Range):  Temp:  [97.8 °F (36.6 °C)-98.7 °F (37.1 °C)] 98.3 °F (36.8 °C)  Pulse:  [104-120] 114  Resp:  [16-22] 22  SpO2:  [94 %-98 %] 95 %  BP: (134-158)/(69-89) 156/84     Weight: 89.5 kg (197 lb 5 oz) (12/08/17 2300)  Body mass index is 26.03 kg/m².      Intake/Output Summary (Last 24 hours) at 12/09/17 1256  Last data filed at 12/09/17 0600   Gross per 24 hour   Intake          1701.25 ml   Output                0 ml   Net          1701.25 ml       Lines/Drains/Airways     Pressure Ulcer                 Pressure Ulcer 11/12/17 2021 posterior sacral spine Stage III 26 days          Peripheral Intravenous Line                 Peripheral IV - Single Lumen 12/08/17 1225 Right Hand 1 day                Physical Exam   Constitutional: He appears well-developed and well-nourished. He appears lethargic.   HENT:   Head: Normocephalic and atraumatic.   Eyes: Conjunctivae are normal. Pupils are equal, round, and reactive to light. Scleral icterus is present.   Neck: Normal range of motion. Neck supple. No tracheal deviation present. No thyromegaly present.   Cardiovascular: Normal rate.  An irregular rhythm present.   Pulmonary/Chest: Effort normal and breath sounds normal. He has no wheezes. He has  no rales. He exhibits no tenderness.   Abdominal: Soft. Bowel sounds are normal. He exhibits distension. He exhibits no mass. There is no tenderness. There is no guarding.   Musculoskeletal: Normal range of motion. He exhibits edema.   Lymphadenopathy:     He has no cervical adenopathy.   Neurological: He appears lethargic. No cranial nerve deficit.   Skin: Skin is warm and dry. He is not diaphoretic.   Psychiatric: He has a normal mood and affect. His behavior is normal.   Nursing note and vitals reviewed.      Significant Labs:  Acute Hepatitis Panel: No results for input(s): HEPBSAG, HEPAIGM, HEPCAB in the last 48 hours.    Invalid input(s): HAPBIGM  CBC:   Recent Labs  Lab 12/08/17  1225 12/09/17  0448   WBC 3.33* 2.96*   HGB 15.3 13.8*   HCT 44.8 41.6   PLT 34* 32*     BMP:   Recent Labs  Lab 12/08/17  1225 12/09/17  0448   * 105   * 134*   K 5.7* 5.0    107   CO2 20* 20*   BUN 31* 29*   CREATININE 1.1 1.1   CALCIUM 10.9* 11.2*   MG 2.3  --      Coagulation:   Recent Labs  Lab 12/08/17  1225 12/09/17  0448   INR 1.2 1.2   APTT 30.8  --      Lipase:   Recent Labs  Lab 12/08/17  1225   LIPASE 32     Liver Function Test:   Recent Labs  Lab 12/08/17  1225 12/09/17  0448   * 177*   * 151*   ALKPHOS 331* 278*   BILITOT 3.2* 3.0*   PROT 6.3 6.3   ALBUMIN 2.0* 2.8*       Significant Imaging:  Imaging results within the past 24 hours have been reviewed.    Assessment/Plan:     * Hepatic encephalopathy    MELD-Na score: 17 at 12/9/2017  4:48 AM  MELD score: 14 at 12/9/2017  4:48 AM  Calculated from:  Serum Creatinine: 1.1 mg/dL at 12/9/2017  4:48 AM  Serum Sodium: 134 mmol/L at 12/9/2017  4:48 AM  Total Bilirubin: 3.0 mg/dL at 12/9/2017  4:48 AM  INR(ratio): 1.2 at 12/9/2017  4:48 AM  Age: 66 years    Lab Results   Component Value Date    XB5MYHVG 3.8 (L) 11/12/2017       Patient with end stage AIDS and chronic liver disease due to chronic Hepatitis C. Continue with lactulose.      Prognosis is guarded.         Hyponatremia    Due to cirrhosis. Monitor and decrease water intake if needed. Holding diuretics may also help.         AIDS (acquired immunodeficiency syndrome), CD4 <=200    Part of mental status changes could be from advanced AIDS. Work up by HM in progress.         PCP Pneumonia withy acute Hypoxemic Resp Failure     HM managing and treating.         Cirrhosis of liver with ascites    Chronic condition. Patient is not a candidate for liver transplant at this point. Continue monitoring.        Decompensated cirrhosis related to hepatitis C virus (HCV)    Patient needs daily labs to monitor synthetic liver functions.         Hypercalcemia     managing.         Hyperkalemia     managing.         Abnormal liver enzymes    Due to cirrhosis and chronic HCV.             Thank you for your consult. I will sign off. Please contact us if you have any additional questions.    Jeremy Fletcher MD  Gastroenterology  [facility]

## 2017-12-09 NOTE — SUBJECTIVE & OBJECTIVE
Past Medical History:   Diagnosis Date    AIDS     Chronic back pain     Chronic hepatitis C     DDD (degenerative disc disease), lumbar     Diabetes mellitus     Hepatitis B antibody positive     History of anal cancer     HIV (human immunodeficiency virus infection)     Hypertension     Neuropathy     Obesity     Syphilis     Vitreous detachment of right eye        Past Surgical History:   Procedure Laterality Date    anal biopsy      COLONOSCOPY W/ BIOPSIES      ESOPHAGOGASTRODUODENOSCOPY      EYE SURGERY      MEDIPORT INSERTION, SINGLE      MEDIPORT REMOVAL         Review of patient's allergies indicates:  No Known Allergies  Family History     Problem Relation (Age of Onset)    Diabetes Sister, Brother    No Known Problems Mother, Father        Social History Main Topics    Smoking status: Former Smoker     Quit date: 1999    Smokeless tobacco: Never Used    Alcohol use Yes      Comment: occasional    Drug use: No    Sexual activity: No     Review of Systems   Unable to perform ROS: Mental status change     Objective:     Vital Signs (Most Recent):  Temp: 98.3 °F (36.8 °C) (12/09/17 1144)  Pulse: (!) 114 (12/09/17 1213)  Resp: (!) 22 (12/09/17 1213)  BP: (!) 156/84 (12/09/17 1144)  SpO2: 95 % (12/09/17 1213) Vital Signs (24h Range):  Temp:  [97.8 °F (36.6 °C)-98.7 °F (37.1 °C)] 98.3 °F (36.8 °C)  Pulse:  [104-120] 114  Resp:  [16-22] 22  SpO2:  [94 %-98 %] 95 %  BP: (134-158)/(69-89) 156/84     Weight: 89.5 kg (197 lb 5 oz) (12/08/17 2300)  Body mass index is 26.03 kg/m².      Intake/Output Summary (Last 24 hours) at 12/09/17 1256  Last data filed at 12/09/17 0600   Gross per 24 hour   Intake          1701.25 ml   Output                0 ml   Net          1701.25 ml       Lines/Drains/Airways     Pressure Ulcer                 Pressure Ulcer 11/12/17 2021 posterior sacral spine Stage III 26 days          Peripheral Intravenous Line                 Peripheral IV - Single Lumen 12/08/17  1225 Right Hand 1 day                Physical Exam   Constitutional: He appears well-developed and well-nourished. He appears lethargic.   HENT:   Head: Normocephalic and atraumatic.   Eyes: Conjunctivae are normal. Pupils are equal, round, and reactive to light. Scleral icterus is present.   Neck: Normal range of motion. Neck supple. No tracheal deviation present. No thyromegaly present.   Cardiovascular: Normal rate.  An irregular rhythm present.   Pulmonary/Chest: Effort normal and breath sounds normal. He has no wheezes. He has no rales. He exhibits no tenderness.   Abdominal: Soft. Bowel sounds are normal. He exhibits distension. He exhibits no mass. There is no tenderness. There is no guarding.   Musculoskeletal: Normal range of motion. He exhibits edema.   Lymphadenopathy:     He has no cervical adenopathy.   Neurological: He appears lethargic. No cranial nerve deficit.   Skin: Skin is warm and dry. He is not diaphoretic.   Psychiatric: He has a normal mood and affect. His behavior is normal.   Nursing note and vitals reviewed.      Significant Labs:  Acute Hepatitis Panel: No results for input(s): HEPBSAG, HEPAIGM, HEPCAB in the last 48 hours.    Invalid input(s): HAPBIGM  CBC:   Recent Labs  Lab 12/08/17  1225 12/09/17  0448   WBC 3.33* 2.96*   HGB 15.3 13.8*   HCT 44.8 41.6   PLT 34* 32*     BMP:   Recent Labs  Lab 12/08/17  1225 12/09/17  0448   * 105   * 134*   K 5.7* 5.0    107   CO2 20* 20*   BUN 31* 29*   CREATININE 1.1 1.1   CALCIUM 10.9* 11.2*   MG 2.3  --      Coagulation:   Recent Labs  Lab 12/08/17  1225 12/09/17  0448   INR 1.2 1.2   APTT 30.8  --      Lipase:   Recent Labs  Lab 12/08/17  1225   LIPASE 32     Liver Function Test:   Recent Labs  Lab 12/08/17  1225 12/09/17  0448   * 177*   * 151*   ALKPHOS 331* 278*   BILITOT 3.2* 3.0*   PROT 6.3 6.3   ALBUMIN 2.0* 2.8*       Significant Imaging:  Imaging results within the past 24 hours have been reviewed.

## 2017-12-09 NOTE — HOSPITAL COURSE
"Mr. Rosales is a 65 yo male  with HIV/AIDS, HCV cirrhosis, HTN, HLD, DM II, chronic back pain, PUD, and h/o anal cancer who presented to ED from [facility] with AMS and generalized weakness x 2 days. Subsequently admitted and found to have hepatic encephalopathy with Ammonia 89-- treated with Lactulose. Last night also got a dose of Ativan 2 mg IV for agitation and since then the pt has been sleeping and has not taken any of his oral meds today including Lactulose. Family concerned. Pt is arousable with deep stimuli but falls back asleep. Repeat Ammonia was 28. LFts also decreased. GI consulted and his MELD score is 17. He appears to have poor prognosis due to his ES AIDs and Ch Hep C Cirrhosis with ascites but no SBP. Ordered NGT placement to give him Lactulose.    12/10-- remains groggy but more alert and responsive today, getting meds thru NG. No fever or chills, no flapping tremor. Ammonia was 28 yesterday and today is 37, getting Lactulose via NG and had a large BM this am. Now starting water thru the NGT.     12/11-- appears awake but very weak, voice weak but speech clear, he appears exhausted and weak. NG in place. ALT/AST improving Bili rising. Daughter informs that he was not eating much for 3 days before coming and has not had much to eat here either. I expalined to the daughter ( POA) and pt's wife that his condition is worsening due to advanced liver failure as well as advanced ES AIDS but the daughter does not believe it, however mother/wife accepts and understands. TD starting now. Explained risk of aspiration to them as well.     12/12- The patient is tolerating tube feeding. The patients total bili continues to worsen. The patient is more lethargic today. No family at bedside today to discuss plan of care.     12/13 Progressively worsening liver function, MELD is now 28. Discussed poor prognosis with ex-wife, she states that the daughter Sariah is the POA and she wants "everything " "done". GI/Infectious disease consulted.    Long discussion with the patients daughter Sariah who agreed to making the patient a DNR. The patient continued to declined over the next few hours. The patient  at 6:58 pm with family at bedside.   "

## 2017-12-10 NOTE — SUBJECTIVE & OBJECTIVE
Interval History: remains groggy but more alert and responsive today, getting meds thru NG. No fever or chills, no flapping tremor. Ammonia was 28 yesterday and today is 37, getting Lactulose via NG and had a large BM this am. Now starting water thru the NGT.     Review of Systems   Unable to perform ROS: Mental status change     Objective:     Vital Signs (Most Recent):  Temp: 98 °F (36.7 °C) (12/10/17 1539)  Pulse: 104 (12/10/17 1539)  Resp: 20 (12/10/17 1539)  BP: 130/78 (12/10/17 1539)  SpO2: 97 % (12/10/17 1539) Vital Signs (24h Range):  Temp:  [98 °F (36.7 °C)-98.5 °F (36.9 °C)] 98 °F (36.7 °C)  Pulse:  [104-121] 104  Resp:  [18-22] 20  SpO2:  [93 %-99 %] 97 %  BP: (129-142)/(71-86) 130/78     Weight: 90.6 kg (199 lb 11.8 oz)  Body mass index is 26.35 kg/m².    Intake/Output Summary (Last 24 hours) at 12/10/17 1729  Last data filed at 12/10/17 1007   Gross per 24 hour   Intake             1925 ml   Output                0 ml   Net             1925 ml      Physical Exam   Constitutional: He appears well-developed and well-nourished.   Elderly man, lying in bed, eyes half closed but wakes up easily   HENT:   Head: Normocephalic and atraumatic.   Tongue dry and coated, mouth open   Eyes: Conjunctivae are normal. Pupils are equal, round, and reactive to light. Scleral icterus is present.   Neck: Normal range of motion. Neck supple. No tracheal deviation present. No thyromegaly present.   Cardiovascular: Normal rate, regular rhythm and normal heart sounds.    No murmur heard.  Pulmonary/Chest: Effort normal and breath sounds normal. He has no wheezes. He has no rales. He exhibits no tenderness.   Abdominal: Soft. Bowel sounds are normal. He exhibits distension. He exhibits no mass. There is no tenderness. There is no guarding.   Musculoskeletal: Normal range of motion. He exhibits edema.   Lymphadenopathy:     He has no cervical adenopathy.   Neurological: He is alert. No cranial nerve deficit.   Skin: Skin is warm  and dry. Capillary refill takes 2 to 3 seconds. He is not diaphoretic.   Nursing note and vitals reviewed.      Significant Labs:   BMP:   Recent Labs  Lab 12/10/17  0630   GLU 97   *   K 6.3*      CO2 18*   BUN 26*   CREATININE 0.9   CALCIUM 10.8*     CBC:   Recent Labs  Lab 12/09/17  0448 12/10/17  0630   WBC 2.96* 3.09*   HGB 13.8* 15.5   HCT 41.6 45.6   PLT 32* 24*     CMP:   Recent Labs  Lab 12/09/17  0448 12/10/17  0630   * 132*   K 5.0 6.3*    109   CO2 20* 18*    97   BUN 29* 26*   CREATININE 1.1 0.9   CALCIUM 11.2* 10.8*   PROT 6.3 5.8*   ALBUMIN 2.8* 2.4*   BILITOT 3.0* 3.2*   ALKPHOS 278* 252*   * 127*   * 143*   ANIONGAP 7* 5*   EGFRNONAA >60 >60     Coagulation:   Recent Labs  Lab 12/10/17  0630   INR 1.5*     All pertinent labs within the past 24 hours have been reviewed.    Significant Imaging: I have reviewed all pertinent imaging results/findings within the past 24 hours.

## 2017-12-10 NOTE — ASSESSMENT & PLAN NOTE
Acute hepatic encephalopathy - may have metabolic encephalopathy as well  Had a recent Lumbar puncture -negative for VDRL and FELICITY virus  Lactulose  GI consult     12/9-- Apparently better, Ammonia level down   However still has persistent somnolence likely due to Ativan given for agitation      12/12- improving, ammonia Normal x 2 days  Hypersomnolence due to Ativan

## 2017-12-10 NOTE — ASSESSMENT & PLAN NOTE
Recently restarted HAART  Consult ID  Cont bactrim     Appears ES AIDS, CD4 only 3  Recommended Hospice to family but daughter (POA) not accepting yet    Continue HAART tx via NG

## 2017-12-10 NOTE — PROGRESS NOTES
"[facility] Hospital Medicine  Progress Note    Patient Name: Jeffrey Rosales  MRN: [id number]  Patient Class: IP- Inpatient   Admission Date: 12/8/2017  Length of Stay: 2 days  Attending Physician: Tommy Myles MD  Primary Care Provider: Provider Not[employer]ystem        Subjective:     Principal Problem:Hepatic encephalopathy    HPI:  Mr. Rosales is a 65 yo male  with  HIV/AIDS, HCV cirrhosis, HTN, HLD, DM II, chronic back pain, PUD, and h/o anal cancer who presented to ED from [facility] with AMS and generalized weakness. The pt's family reports over the last 2 days, the pt had increasing confusion, agitation, and hallucinations. He also was cursing at staff and threatening to "punch" his son which is very out of character. Also, the family reports pt was refusing medications or eating.   Discharged 11/23/17 to SNF from hospital for PCP pneumonia. Had lumbar puncture during hospitalization for hallucinations and FELICITY virus and neuro syphilis ruled out       Hospital Course:  Mr. Roslaes is a 65 yo male  with HIV/AIDS, HCV cirrhosis, HTN, HLD, DM II, chronic back pain, PUD, and h/o anal cancer who presented to ED from [facility] with AMS and generalized weakness x 2 days. Subsequently admitted and found to have hepatic encephalopathy with Ammonia 89-- treated with Lactulose. Last night also got a dose of Ativan 2 mg IV for agitation and since then the pt has been sleeping and has not taken any of his oral meds today including Lactulose. Family concerned. Pt is arousable with deep stimuli but falls back asleep. Repeat Ammonia was 28. LFts also decreased. GI consulted and his MELD score is 17. He appears to have poor prognosis due to his ES AIDs and Ch Hep C Cirrhosis with ascites but no SBP. Ordered NGT placement to give him Lactulose.    12/10-- remains groggy but more alert and responsive today, getting meds thru NG. No fever or chills, no flapping tremor. Ammonia was 28 " yesterday and today is 37, getting Lactulose via NG and had a large BM this am. Now starting water thru the NGT.     Interval History: remains groggy but more alert and responsive today, getting meds thru NG. No fever or chills, no flapping tremor. Ammonia was 28 yesterday and today is 37, getting Lactulose via NG and had a large BM this am. Now starting water thru the NGT.     Review of Systems   Unable to perform ROS: Mental status change     Objective:     Vital Signs (Most Recent):  Temp: 98 °F (36.7 °C) (12/10/17 1539)  Pulse: 104 (12/10/17 1539)  Resp: 20 (12/10/17 1539)  BP: 130/78 (12/10/17 1539)  SpO2: 97 % (12/10/17 1539) Vital Signs (24h Range):  Temp:  [98 °F (36.7 °C)-98.5 °F (36.9 °C)] 98 °F (36.7 °C)  Pulse:  [104-121] 104  Resp:  [18-22] 20  SpO2:  [93 %-99 %] 97 %  BP: (129-142)/(71-86) 130/78     Weight: 90.6 kg (199 lb 11.8 oz)  Body mass index is 26.35 kg/m².    Intake/Output Summary (Last 24 hours) at 12/10/17 1729  Last data filed at 12/10/17 1007   Gross per 24 hour   Intake             1925 ml   Output                0 ml   Net             1925 ml      Physical Exam   Constitutional: He appears well-developed and well-nourished.   Elderly man, lying in bed, eyes half closed but wakes up easily   HENT:   Head: Normocephalic and atraumatic.   Tongue dry and coated, mouth open   Eyes: Conjunctivae are normal. Pupils are equal, round, and reactive to light. Scleral icterus is present.   Neck: Normal range of motion. Neck supple. No tracheal deviation present. No thyromegaly present.   Cardiovascular: Normal rate, regular rhythm and normal heart sounds.    No murmur heard.  Pulmonary/Chest: Effort normal and breath sounds normal. He has no wheezes. He has no rales. He exhibits no tenderness.   Abdominal: Soft. Bowel sounds are normal. He exhibits distension. He exhibits no mass. There is no tenderness. There is no guarding.   Musculoskeletal: Normal range of motion. He exhibits edema.    Lymphadenopathy:     He has no cervical adenopathy.   Neurological: He is alert. No cranial nerve deficit.   Skin: Skin is warm and dry. Capillary refill takes 2 to 3 seconds. He is not diaphoretic.   Nursing note and vitals reviewed.      Significant Labs:   BMP:   Recent Labs  Lab 12/10/17  0630   GLU 97   *   K 6.3*      CO2 18*   BUN 26*   CREATININE 0.9   CALCIUM 10.8*     CBC:   Recent Labs  Lab 12/09/17 0448 12/10/17  0630   WBC 2.96* 3.09*   HGB 13.8* 15.5   HCT 41.6 45.6   PLT 32* 24*     CMP:   Recent Labs  Lab 12/09/17 0448 12/10/17  0630   * 132*   K 5.0 6.3*    109   CO2 20* 18*    97   BUN 29* 26*   CREATININE 1.1 0.9   CALCIUM 11.2* 10.8*   PROT 6.3 5.8*   ALBUMIN 2.8* 2.4*   BILITOT 3.0* 3.2*   ALKPHOS 278* 252*   * 127*   * 143*   ANIONGAP 7* 5*   EGFRNONAA >60 >60     Coagulation:   Recent Labs  Lab 12/10/17  0630   INR 1.5*     All pertinent labs within the past 24 hours have been reviewed.    Significant Imaging: I have reviewed all pertinent imaging results/findings within the past 24 hours.    Assessment/Plan:      * Hepatic encephalopathy    Acute hepatic encephalopathy - may have metabolic encephalopathy as well  Had a recent Lumbar puncture -negative for VDRL and FELICITY virus  Lactulose  GI consult     12/9-- Apparently better, Ammonia level down   However still has persistent somnolence likely due to Ativan given for agitation      12/12- improving, ammonia Normal x 2 days  Hypersomnolence due to Ativan        Cirrhosis of liver with ascites    As above          Decompensated cirrhosis related to hepatitis C virus (HCV)    Discussed with GI   R/O SBP first  See above     Mild Ascites but no Obvious SBP-- MELD 17            AIDS (acquired immunodeficiency syndrome), CD4 <=200    Recently restarted HAART  Consult ID  Cont bactrim     Appears ES AIDS, CD4 only 3  Recommended Hospice to family but daughter (POA) not accepting yet    Continue HAART tx  via NG          Recent Hx of PCP Pneumonia withy acute Hypoxemic Resp Failure     Bactrim     Appears stable   Continue present care          Abnormal liver enzymes    improving          Hypercalcemia    Likely secondary to dehydration   IV hydration given  IV albumin               VTE Risk Mitigation         Ordered     Medium Risk of VTE  Once      12/08/17 1843     Reason for No Pharmacological VTE Prophylaxis  Once      12/08/17 1843              Tommy Myles MD  Department of Hospital Medicine   [facility]

## 2017-12-10 NOTE — PLAN OF CARE
Problem: Patient Care Overview  Goal: Plan of Care Review  Outcome: Ongoing (interventions implemented as appropriate)  patient more alert as shift progresses, opens eyes to voice, medication given per NGT, placement confirmed via cxray, IV fluids infusing, mepilex to stage III pressure ulcer changed, turing q2 hours with wedge, remains ST on monitor, no non-verbal indicators of pain, bed alarm active for fall prevention will continue to monitor.

## 2017-12-10 NOTE — PLAN OF CARE
Problem: Patient Care Overview  Goal: Plan of Care Review  Outcome: Ongoing (interventions implemented as appropriate)  Pt free of falls during shift. VSS. PIV intact and saline locked. Sinus tach on tele monitor MD aware. Pt on room air, no SOB noted. NG tube in place and meds given through as ordered. Blood sugars monitored as ordered. Pt remains NPO as ordered. Critical platelet and potassium labs this morning - MD aware, see flow sheet. Kayexalate given as ordered. Pt has had multiple BM during shift- incontinence care provided. Call light in reach, hourly rounding made, family at bedside, will continue to monitor.

## 2017-12-11 NOTE — ASSESSMENT & PLAN NOTE
Discussed with GI   R/O SBP first  See above     Mild Ascites but no Obvious SBP-- MELD 17    As above

## 2017-12-11 NOTE — PLAN OF CARE
Patient returned from [facility]. Patient with AMS. Family very upset and does not want patient to return. CM explained the difference in HH, SNF and the 24 hr care he will need. Family stated they will want to look into other facilities once PT recommendations are received. CM to f/u for safe transition     12/10/17 2020   Discharge Assessment   Assessment Type Discharge Planning Assessment   Confirmed/corrected address and phone number on facesheet? Yes   Assessment information obtained from? Patient;Medical Record   Communicated expected length of stay with patient/caregiver no   Prior to hospitilization cognitive status: Unable to Assess   Prior to hospitalization functional status: Wheelchair Bound   Current cognitive status: Unable to Assess   Current Functional Status: Wheelchair Bound   Lives With facility resident   Able to Return to Prior Arrangements unable to determine at this time (comments)   Is patient able to care for self after discharge? No   Patient's perception of discharge disposition skilled nursing facility   Readmission Within The Last 30 Days current reason for admission unrelated to previous admission   If yes, most recent facility name: [facility]   Patient currently being followed by outpatient case management? No   Patient currently receives any other outside agency services? No   Equipment Currently Used at Home walker, rolling   Do you have any problems affording any of your prescribed medications? No   Is the patient taking medications as prescribed? yes   Does the patient have transportation home? Yes   Transportation Available family or friend will provide   Does the patient receive services at the [facility]? No   Discharge Plan A Skilled Nursing Facility   Discharge Plan B Skilled Nursing Facility   Patient/Family In Agreement With Plan yes

## 2017-12-11 NOTE — ASSESSMENT & PLAN NOTE
Recently restarted HAART  Consult ID  Cont bactrim     Appears ES AIDS, CD4 only 3  Recommended Hospice to family but daughter (POA) not accepting yet    Continue HAART tx via NG    Appears to be slowly dying/ terminal stage  Hospice appropriate but the daughter who is the POA wont accept or listen anything about it  He remains full code as well

## 2017-12-11 NOTE — PLAN OF CARE
Problem: Patient Care Overview  Goal: Plan of Care Review  Outcome: Ongoing (interventions implemented as appropriate)  Resting on RA and tolerating neb txs. Not distress noted at this time.

## 2017-12-11 NOTE — PLAN OF CARE
12/10/17 2024   Readmission Questionnaire   At the time of your discharge, did someone talk to you about what your health problems were? Yes   At the time of discharge, did someone talk to you about what to watch out for regarding worsening of your health problem? Yes   At the time of discharge, did someone talk to you about what to do if you experienced worsening of your health problem? Yes   At the time of discharge, did someone talk to you about which medication to take when you left the hospital and which ones to stop taking? Yes   At the time of discharge, did someone talk to you about when and where to follow up with a doctor after you left the hospital? Yes   How often do you need to have someone help you when you read instructions, pamphlets, or other written material from your doctor or pharmacy? Always   Do you have problems taking your medications as prescribed? No   Do you have any problems affording any of  your prescribed medications? Yes   Do you have problems obtaining/receiving your medications? No   Does the patient have transportation to healthcare appointments? Yes   Lives With facility resident   Living Arrangements mobile home   Does the patient have family/friends to help with healtcare needs after discharge? other (comments)   Does your caregiver provide all the help you need? Yes   Are you currently feeling confused? (UNABLE TO ASSESS DUE TO PATIENT SLEEPING)   Are you currently having memory problems? No   Have you felt down, depressed, or hopeless? 0   Have you felt little interest or pleasure in doing things? 0   In the last 7 days, my sleep quality was: fair

## 2017-12-11 NOTE — ASSESSMENT & PLAN NOTE
Acute hepatic encephalopathy - may have metabolic encephalopathy as well  Had a recent Lumbar puncture -negative for VDRL and FELICITY virus  Lactulose  GI consult     12/9-- Apparently better, Ammonia level down   However still has persistent somnolence likely due to Ativan given for agitation      12/10- improving, ammonia Normal x 2 days  Hypersomnolence due to Ativan    12/11- eyes open but still lethargic and listless, emaciated  No s/s of encephalopathy

## 2017-12-11 NOTE — CONSULTS
"Consulted on this 67 y/o M patient for present on admission stage 3 pressure injury to coccyx.  Patient has pmh significant for HIV, HCV, HTN, DM, PUD, and is a resident at [facility].  He is alert, but not verbally responsive at this time. Primary nurse KEN Brenner, RN present at bedside.  Brief soiled with loose brown stool. Incontinence care provided with easi cleanse foam wipes.  Mild IAD noted to andreas-rectal area, critic aid clear barrier applied. mepilex removed.  sDTI to coccyx has evolved into stage 3 pressure injury with moist purple and whitish yellow wound bed measuring 3x3cm.  Intact andreas wound skin painted with cavilon, and melgisorb AG cut to size and applied over open ulceration.  Covered with Duoderm to secure.  Please see below for wound care recommendations:    Skin Care Precautions / Pressure Injury Prevention:  1. Follow "Guidelines for Prevention of Pressure Ulcers in At Risk Patients"  These guidelines can be found on the [facility] Intranet by searching "Wound Care / Ostomy Resources"  2. Document wound assessment in [facility] using guidelines in Candace's "Assessment : Wound" procedure  3. Limit the amount of linen/underpad between patient and mattress surface to ONE fitted sheet and ONE covidien underpad - NO draw sheet/briefs.  4. Obtain Easi Cleans Foam Wipes for providing andreas care - avoid the use of wash cloths to areas affected by IAD.  5. Apply Clear Barrier Ointment to perineal / perirectal areas in a thin even layer to clean dry skin BID and after each episode of pericare  6. Apply sween 24 moisturizer cream to all dry skin after daily bath and prn  7. Obtain foam wedge from materials management to assist with maintaining proper position changes at least q 2hours and document actual position in EPIC q 2hours  8. Elevate heels off mattress on 2 separate pillows placed lengthwise under each leg supporting the leg from knee to ankle.  Document in EPIC flow sheet every 2 hours.  9. " .Do NOT elevate HOB greater than 30 degrees unless contraindicated.  10. Remove SCD/Plexi Pulses/ANA's every 12 hours for 30 minutes and assess skin underneath these devices for breakdown        Stage 3 pressure injury COccyx:  1. Cleanse with easi cleanse foam wipes  2. Pat dry  3. Paint intact andreas wound skin with cavilon  4. Cut Melgisorb AG to size and apply over open wound  5. Apply Duoderm to secure  6. Change every 3 days

## 2017-12-11 NOTE — PROGRESS NOTES
"[facility] Hospital Medicine  Progress Note    Patient Name: Jeffrey Rosales  MRN: [id number]  Patient Class: IP- Inpatient   Admission Date: 12/8/2017  Length of Stay: 3 days  Attending Physician: Tommy Myles MD  Primary Care Provider: Provider Not[employer]ystem        Subjective:     Principal Problem:Hepatic encephalopathy    HPI:  Mr. Rosales is a 65 yo male  with  HIV/AIDS, HCV cirrhosis, HTN, HLD, DM II, chronic back pain, PUD, and h/o anal cancer who presented to ED from [facility] with AMS and generalized weakness. The pt's family reports over the last 2 days, the pt had increasing confusion, agitation, and hallucinations. He also was cursing at staff and threatening to "punch" his son which is very out of character. Also, the family reports pt was refusing medications or eating.   Discharged 11/23/17 to SNF from hospital for PCP pneumonia. Had lumbar puncture during hospitalization for hallucinations and FELICITY virus and neuro syphilis ruled out       Hospital Course:  Mr. Rosales is a 65 yo male  with HIV/AIDS, HCV cirrhosis, HTN, HLD, DM II, chronic back pain, PUD, and h/o anal cancer who presented to ED from [facility] with AMS and generalized weakness x 2 days. Subsequently admitted and found to have hepatic encephalopathy with Ammonia 89-- treated with Lactulose. Last night also got a dose of Ativan 2 mg IV for agitation and since then the pt has been sleeping and has not taken any of his oral meds today including Lactulose. Family concerned. Pt is arousable with deep stimuli but falls back asleep. Repeat Ammonia was 28. LFts also decreased. GI consulted and his MELD score is 17. He appears to have poor prognosis due to his ES AIDs and Ch Hep C Cirrhosis with ascites but no SBP. Ordered NGT placement to give him Lactulose.    12/10-- remains groggy but more alert and responsive today, getting meds thru NG. No fever or chills, no flapping tremor. Ammonia was 28 " yesterday and today is 37, getting Lactulose via NG and had a large BM this am. Now starting water thru the NGT.     12/11-- appears awake but very weak, voice weak but speech clear, he appears exhausted and weak. NG in place. ALT/AST improving Bili rising. Daughter informs that he was not eating much for 3 days before coming and has not had much to eat here either. I expalined to the daughter ( POA) and pt's wife that his condition is worsening due to advanced liver failure as well as advanced ES AIDS but the daughter does not believe it, however mother/wife accepts and understands. TD starting now. Explained risk of aspiration to them as well.     Interval History: appears more alert and responsive but weaker and frail, exhausted, emaciated, denies any complaints. Abdomen distended but no other complaints. No appetite, does not desire anything to eat. BG in place.    Review of Systems   Constitutional: Positive for activity change, appetite change and fatigue.   HENT: Negative.    Eyes: Negative.    Respiratory: Negative for shortness of breath.    Cardiovascular: Negative.    Gastrointestinal: Positive for abdominal distention. Negative for abdominal pain, anal bleeding, blood in stool, constipation, diarrhea, nausea and vomiting.   Endocrine: Negative.    Genitourinary: Positive for decreased urine volume.   Musculoskeletal: Positive for gait problem.   Skin: Positive for color change.   Neurological: Positive for speech difficulty and weakness.   Psychiatric/Behavioral: Positive for decreased concentration.     Objective:     Vital Signs (Most Recent):  Temp: 97.7 °F (36.5 °C) (12/11/17 1208)  Pulse: (!) 117 (12/11/17 1313)  Resp: 20 (12/11/17 1313)  BP: (!) 139/91 (12/11/17 1208)  SpO2: 95 % (12/11/17 1313) Vital Signs (24h Range):  Temp:  [97.5 °F (36.4 °C)-98.9 °F (37.2 °C)] 97.7 °F (36.5 °C)  Pulse:  [117-129] 117  Resp:  [16-20] 20  SpO2:  [91 %-97 %] 95 %  BP: (135-139)/(91-97) 139/91     Weight: 89.4 kg  (197 lb 1.5 oz)  Body mass index is 26 kg/m².    Intake/Output Summary (Last 24 hours) at 12/11/17 1723  Last data filed at 12/11/17 0500   Gross per 24 hour   Intake              300 ml   Output                0 ml   Net              300 ml      Physical Exam   Constitutional: He is oriented to person, place, and time. He appears cachectic. He is easily aroused. He does not have a sickly appearance. He appears ill. No distress.   Elderly man, lying in bed, eyes half closed but wakes up easily   HENT:   Head: Normocephalic and atraumatic.   Tongue dry and coated, mouth open   Eyes: Conjunctivae are normal. Pupils are equal, round, and reactive to light. Scleral icterus is present.   Neck: Normal range of motion. Neck supple. No tracheal deviation present. No thyromegaly present.   Cardiovascular: Normal rate, regular rhythm and normal heart sounds.    No murmur heard.  Pulmonary/Chest: Effort normal and breath sounds normal. He has no wheezes. He has no rales. He exhibits no tenderness.   Abdominal: Soft. Bowel sounds are normal. He exhibits distension. He exhibits no mass. There is no tenderness. There is no guarding.   Musculoskeletal: Normal range of motion. He exhibits edema.   Lymphadenopathy:     He has no cervical adenopathy.   Neurological: He is alert, oriented to person, place, and time and easily aroused. No cranial nerve deficit.   Skin: Skin is warm and dry. Capillary refill takes 2 to 3 seconds. He is not diaphoretic.   Nursing note and vitals reviewed.      Significant Labs:   CBC:   Recent Labs  Lab 12/10/17  0630 12/11/17  0548   WBC 3.09* 4.99   HGB 15.5 16.0   HCT 45.6 46.5   PLT 24* 37*     CMP:   Recent Labs  Lab 12/10/17  0630 12/11/17  0548   * 139   K 6.3* 4.0    112*   CO2 18* 19*   GLU 97 130*   BUN 26* 34*   CREATININE 0.9 1.2   CALCIUM 10.8* 11.0*   PROT 5.8* 5.8*   ALBUMIN 2.4* 2.3*   BILITOT 3.2* 4.5*   ALKPHOS 252* 310*   * 158*   * 149*   ANIONGAP 5* 8    EGFRNONAA >60 >60     Coagulation:   Recent Labs  Lab 12/11/17  0548   INR 1.5*     All pertinent labs within the past 24 hours have been reviewed.    Significant Imaging: I have reviewed all pertinent imaging results/findings within the past 24 hours.    Assessment/Plan:      * Hepatic encephalopathy    Acute hepatic encephalopathy - may have metabolic encephalopathy as well  Had a recent Lumbar puncture -negative for VDRL and FELICITY virus  Lactulose  GI consult     12/9-- Apparently better, Ammonia level down   However still has persistent somnolence likely due to Ativan given for agitation      12/10- improving, ammonia Normal x 2 days  Hypersomnolence due to Ativan    12/11- eyes open but still lethargic and listless, emaciated  No s/s of encephalopathy        Cirrhosis of liver with ascites    As above    Will start Lasix/ Aldactone once pt more alert and responsive        Decompensated cirrhosis related to hepatitis C virus (HCV)    Discussed with GI   R/O SBP first  See above     Mild Ascites but no Obvious SBP-- MELD 17    As above            AIDS (acquired immunodeficiency syndrome), CD4 <=200    Recently restarted HAART  Consult ID  Cont bactrim     Appears ES AIDS, CD4 only 3  Recommended Hospice to family but daughter (POA) not accepting yet    Continue HAART tx via NG    Appears to be slowly dying/ terminal stage  Hospice appropriate but the daughter who is the POA wont accept or listen anything about it  He remains full code as well            Recent Hx of PCP Pneumonia withy acute Hypoxemic Resp Failure     Bactrim     Appears stable   Continue present care    Still on Bactrim and Zithromax          Abnormal liver enzymes    Improving ALT/AST but Bili rising            Hypercalcemia    Likely secondary to dehydration   IV hydration given  IV albumin               VTE Risk Mitigation         Ordered     Medium Risk of VTE  Once      12/08/17 2443     Reason for No Pharmacological VTE Prophylaxis  Once       12/08/17 1843              Tommy Myles MD  Department of Hospital Medicine   [facility]

## 2017-12-11 NOTE — SUBJECTIVE & OBJECTIVE
Interval History: appears more alert and responsive but weaker and frail, exhausted, emaciated, denies any complaints. Abdomen distended but no other complaints. No appetite, does not desire anything to eat. BG in place.    Review of Systems   Constitutional: Positive for activity change, appetite change and fatigue.   HENT: Negative.    Eyes: Negative.    Respiratory: Negative for shortness of breath.    Cardiovascular: Negative.    Gastrointestinal: Positive for abdominal distention. Negative for abdominal pain, anal bleeding, blood in stool, constipation, diarrhea, nausea and vomiting.   Endocrine: Negative.    Genitourinary: Positive for decreased urine volume.   Musculoskeletal: Positive for gait problem.   Skin: Positive for color change.   Neurological: Positive for speech difficulty and weakness.   Psychiatric/Behavioral: Positive for decreased concentration.     Objective:     Vital Signs (Most Recent):  Temp: 97.7 °F (36.5 °C) (12/11/17 1208)  Pulse: (!) 117 (12/11/17 1313)  Resp: 20 (12/11/17 1313)  BP: (!) 139/91 (12/11/17 1208)  SpO2: 95 % (12/11/17 1313) Vital Signs (24h Range):  Temp:  [97.5 °F (36.4 °C)-98.9 °F (37.2 °C)] 97.7 °F (36.5 °C)  Pulse:  [117-129] 117  Resp:  [16-20] 20  SpO2:  [91 %-97 %] 95 %  BP: (135-139)/(91-97) 139/91     Weight: 89.4 kg (197 lb 1.5 oz)  Body mass index is 26 kg/m².    Intake/Output Summary (Last 24 hours) at 12/11/17 1723  Last data filed at 12/11/17 0500   Gross per 24 hour   Intake              300 ml   Output                0 ml   Net              300 ml      Physical Exam   Constitutional: He is oriented to person, place, and time. He appears cachectic. He is easily aroused. He does not have a sickly appearance. He appears ill. No distress.   Elderly man, lying in bed, eyes half closed but wakes up easily   HENT:   Head: Normocephalic and atraumatic.   Tongue dry and coated, mouth open   Eyes: Conjunctivae are normal. Pupils are equal, round, and reactive to  light. Scleral icterus is present.   Neck: Normal range of motion. Neck supple. No tracheal deviation present. No thyromegaly present.   Cardiovascular: Normal rate, regular rhythm and normal heart sounds.    No murmur heard.  Pulmonary/Chest: Effort normal and breath sounds normal. He has no wheezes. He has no rales. He exhibits no tenderness.   Abdominal: Soft. Bowel sounds are normal. He exhibits distension. He exhibits no mass. There is no tenderness. There is no guarding.   Musculoskeletal: Normal range of motion. He exhibits edema.   Lymphadenopathy:     He has no cervical adenopathy.   Neurological: He is alert, oriented to person, place, and time and easily aroused. No cranial nerve deficit.   Skin: Skin is warm and dry. Capillary refill takes 2 to 3 seconds. He is not diaphoretic.   Nursing note and vitals reviewed.      Significant Labs:   CBC:   Recent Labs  Lab 12/10/17  0630 12/11/17  0548   WBC 3.09* 4.99   HGB 15.5 16.0   HCT 45.6 46.5   PLT 24* 37*     CMP:   Recent Labs  Lab 12/10/17  0630 12/11/17  0548   * 139   K 6.3* 4.0    112*   CO2 18* 19*   GLU 97 130*   BUN 26* 34*   CREATININE 0.9 1.2   CALCIUM 10.8* 11.0*   PROT 5.8* 5.8*   ALBUMIN 2.4* 2.3*   BILITOT 3.2* 4.5*   ALKPHOS 252* 310*   * 158*   * 149*   ANIONGAP 5* 8   EGFRNONAA >60 >60     Coagulation:   Recent Labs  Lab 12/11/17  0548   INR 1.5*     All pertinent labs within the past 24 hours have been reviewed.    Significant Imaging: I have reviewed all pertinent imaging results/findings within the past 24 hours.

## 2017-12-11 NOTE — PLAN OF CARE
Problem: Patient Care Overview  Goal: Plan of Care Review  Outcome: Ongoing (interventions implemented as appropriate)  Patient had a restful night, NTG placement confirmed with PH strips, meds given through NGT with tap water flushes per md orders.  No non verbal indicators of pain, multiple bowel movement, some noted to have streaks of bright red blood.  Turning q 2 hours, mepilex to stage iii pressure ulcer on sacrum changed, bed alarm activated for fall precautions.

## 2017-12-12 NOTE — PLAN OF CARE
Problem: Patient Care Overview  Goal: Plan of Care Review  Outcome: Ongoing (interventions implemented as appropriate)  Patient stable but nonverbal. Vital signs stable. Sinus tach on telemetry monitor. Tube feeding infusing at 15ml/hour. Residual checked q4 hours. No s/s of distress. Will continue to monitor.

## 2017-12-12 NOTE — PLAN OF CARE
Problem: Patient Care Overview  Goal: Plan of Care Review  Outcome: Ongoing (interventions implemented as appropriate)  Pt remains free of falls and free of injury this shift. Pt's daughter has repeatedly verbalized that pt talks with her; I have only heard mumbling. Pt does follow me with eyes - usually, but not every time. Tube feeding started this afternoon. Pt's daughter indicates that she has tyler that pt will ambulate out of this hospital.  Bed in low position, side rails up x2, call light in reach, family at bedside.

## 2017-12-12 NOTE — CONSULTS
[facility] -   Adult Nutrition  Consult Note    SUMMARY     Recommendations    Recommendation/Intervention: 1. Consider increasing tubefeed rate to 65ml/hr and add Beneprotein 2 packets daily for 2390 calories, 117g protein, 1252ml water. Continue flushes. Hold for residuals greater than 300 or signs of intolerance (distention, vomiting, abdominal pain)  Goals: Meet at least 85% of estimated needs from nutrition support  Nutrition Goal Status: new  Communication of RD Recs:  (care plan and sticky note)      Reason for Assessment    Reason for Assessment: nurse/nurse practitioner consult, identified at risk by screening criteria, new tube feeding (Stage III coccyx')  Diagnosis:  (Acute Encephalopathy)          Past Medical History:   Diagnosis Date    AIDS     Chronic back pain     Chronic hepatitis C     DDD (degenerative disc disease), lumbar     Diabetes mellitus     Hepatitis B antibody positive     History of anal cancer     HIV (human immunodeficiency virus infection)     Hypertension     Neuropathy     Obesity     Syphilis     Vitreous detachment of right eye                Nutrition Discharge Planning: too soon to determine    Nutrition Prescription Ordered    Current Diet Order: NPO     Current Nutrition Support Formula Ordered: Nutren 1.5  Current Nutrition Support Rate Ordered: 40 (ml)  Current Nutrition Support Frequency Ordered: ml/hr        Evaluation of Received Nutrients/Fluid Intake    Enteral Calories (kcal): 1440  Enteral Protein (gm): 65  Enteral (Free Water) Fluid (mL): 733                                   % Kcal Needs: 53                 % Protein Needs: 55                                    % Intake of Estimated Energy Needs: 50 - 75 %  % Meal Intake: NPO     Nutrition Risk Screen     Nutrition Risk Screen: no indicators present    Nutrition/Diet History       Typical Food/Fluid Intake: unable to obtain  Food Preferences: unable to obtain                      "    Labs/Tests/Procedures/Meds       Pertinent Labs Reviewed: reviewed      BMP  Lab Results   Component Value Date     12/12/2017    K 3.6 12/12/2017     (H) 12/12/2017    CO2 16 (L) 12/12/2017    BUN 45 (H) 12/12/2017    CREATININE 1.6 (H) 12/12/2017    CALCIUM 11.2 (H) 12/12/2017    ANIONGAP 10 12/12/2017    ESTGFRAFRICA 51 (A) 12/12/2017    EGFRNONAA 44 (A) 12/12/2017     Lab Results   Component Value Date    CALCIUM 11.2 (H) 12/12/2017    PHOS 1.9 (L) 11/22/2017       Recent Labs  Lab 12/12/17  0629   POCTGLUCOSE 156*     Lab Results   Component Value Date    HGBA1C 4.9 11/12/2017       Pertinent Medications Reviewed: reviewed       Physical Findings          Oral/Mouth Cavity: decay/carries  Skin: pressure ulcer(s) (sacral spine stage III)    Anthropometrics    Temp: 97.8 °F (36.6 °C)     Height: 6' 1" (185.4 cm)  Weight Method: Bed Scale  Weight: 90.5 kg (199 lb 8.3 oz)  Ideal Body Weight (IBW), Male: 184 lb     % Ideal Body Weight, Male (lb): 108.43 lb     BMI (Calculated): 26.4  BMI Grade: 25 - 29.9 - overweight                            Estimated/Assessed Needs    Weight Used For Calorie Calculations: 90.5 kg (199 lb 8.3 oz)      Energy Calorie Requirements (kcal): 2253  Energy Need Method: [place]-St Jeor (x1.3)     30 kcal/kg (kcal): 2715   RMR ([place]-St. Jeor Equation): 1733.88        Weight Used For Protein Calculations: 90.5 kg (199 lb 8.3 oz)     1.3 gm Protein (gm): 117.9 and 1.5 gm Protein (gm): 136.03              RDA Method (mL): 2253               Assessment and Plan    * Hepatic encephalopathy    Nutrition Diagnosis:  Inadequate energy intake    Related to (etiology):   encephalopathy    Signs and Symptoms (as evidenced by):   inability to safely consume adequate energy with current nutrition support meeting about 50% of estimated needs     Interventions/Recommendations (treatment strategy):  1. Increase rate to 65ml/hr and add beneprotein 2 packets daily    Nutrition Diagnosis " Status:   New              Monitor and Evaluation    Food and Nutrient Intake: energy intake  Food and Nutrient Adminstration: diet order, enteral and parenteral nutrition administration     Physical Activity and Function: nutrition-related ADLs and IADLs  Anthropometric Measurements: weight  Biochemical Data, Medical Tests and Procedures: electrolyte and renal panel, glucose/endocrine profile  Nutrition-Focused Physical Findings: skin      Nutrition Follow-Up    RD Follow-up?: Yes (2x weekly)

## 2017-12-12 NOTE — PT/OT/SLP EVAL
Occupational Therapy   Evaluation and Discharge Note    Name: Jeffrey Rosales  MRN: [id number]  Admitting Diagnosis:  Hepatic encephalopathy      Recommendations:     Discharge Recommendations: nursing facility, basic  Discharge Equipment Recommendations:  wheelchair, hospital bed  Barriers to discharge:       History:     Occupational Profile:  Living Environment: lives at home prior to admit to SNF at [facility]  Previous level of function: indep at home, requires assistance at SNF  Roles and Routines:   Equipment Owned:  walker, rolling  Assistance upon Discharge: unknown    Past Medical History:   Diagnosis Date    AIDS     Chronic back pain     Chronic hepatitis C     DDD (degenerative disc disease), lumbar     Diabetes mellitus     Hepatitis B antibody positive     History of anal cancer     HIV (human immunodeficiency virus infection)     Hypertension     Neuropathy     Obesity     Syphilis     Vitreous detachment of right eye        Past Surgical History:   Procedure Laterality Date    anal biopsy      COLONOSCOPY W/ BIOPSIES      ESOPHAGOGASTRODUODENOSCOPY      EYE SURGERY      MEDIPORT INSERTION, SINGLE      MEDIPORT REMOVAL         Subjective     Chief Complaint: nonverbal  Patient/Family stated goals: nonverbal  Communicated with: pt, nurse- Kristy prior to session.  Pain/Comfort:  · Pain Rating 1: 0/10    Objective:     Patient found with: peripheral IV, telemetry, oxygen, PEG Tube    General Precautions: Standard, fall   Orthopedic Precautions:N/A   Braces: N/A     Occupational Performance:    Bed Mobility:    · Patient completed Rolling/Turning to Left with  dependent  · Patient completed Rolling/Turning to Right with dependent    Functional Mobility/Transfers:  · Not tested    Activities of Daily Living:  · Pt is dep with all care - peg tube feeding, total care for g/hy tasks, dressing, toileting, and fx mobility.    Cognitive/Visual Perceptual:  Cognitive/Psychosocial Skills:    "  -       Oriented to: Person   -       Follows Commands/attention:unable to follow simple commands with demonstration provided    Physical Exam:  Upper Extremity Range of Motion:     -       Right Upper Extremity: PROM WFL  -       Left Upper Extremity: PROM  wfl    Patient left supine with all lines intact and call button in reach    [facility] 6    Treatment & Education:    Education:    Assessment:     Jeffrey Rosales is a 66 y.o. male with a medical diagnosis of Hepatic encephalopathy. At this time, patient is functioning at their prior level of function and does not require further acute OT services.     Clinical Decision Making:     3.  OT High:  "Pt evaluation falls under high complexity for evaluation category due to 5+ performance deficits identified with comprehensive assessments and significant modifications/assistance required. An expanded review of history and occupational profile completed in addition to expanded review of physical, cognitive and psychosocial history. Several treatment options considered in care."     Plan:     During this hospitalization, patient does not require further acute OT services.  Please re-consult if situation changes.  Pt will be placed on People 's program for ROM ex to BUE to prevent contractures and skin breakdown.  · Plan of Care Reviewed with: patient    This Plan of care has been discussed with the patient who was involved in its development and understands and is in agreement with the identified goals and treatment plan    GOALS:    Occupational Therapy Goals     Not on file                Time Tracking:     OT Date of Treatment: 12/12/17  OT Start Time: 1313  OT Stop Time: 1333  OT Total Time (min): 20 min    Billable Minutes:Evaluation  X 20 MIN    Sushila Meeks, OT  12/12/2017    "

## 2017-12-12 NOTE — ASSESSMENT & PLAN NOTE
Nutrition Diagnosis:  Inadequate energy intake    Related to (etiology):   encephalopathy    Signs and Symptoms (as evidenced by):   inability to safely consume adequate energy with current nutrition support meeting about 50% of estimated needs     Interventions/Recommendations (treatment strategy):  1. Increase rate to 65ml/hr and add beneprotein 2 packets daily    Nutrition Diagnosis Status:   Continues

## 2017-12-12 NOTE — PT/OT/SLP EVAL
Physical Therapy Evaluation    Patient Name:  Jeffrey Rosales   MRN:  [id number]    Recommendations:     Discharge Recommendations:  nursing facility, basic   Discharge Equipment Recommendations:     Barriers to discharge: None    Assessment:     Jeffrey Rosales is a 66 y.o. male admitted with a medical diagnosis of Hepatic encephalopathy.  He presents with the following impairments/functional limitations:    .    Rehab Prognosis:  POOR; patient would benefit from acute skilled PT services to address these deficits and reach maximum level of function.      Recent Surgery: * No surgery found *      Plan:     During this hospitalization, patient to be seen   to address the above listed problems via    · Plan of Care Expires:      Plan of Care Reviewed with: patient    Subjective     Communicated with NURSE ERNESTINA prior to session.  Patient found SUP IN BED upon PT entry to room, agreeable to evaluation.      Chief Complaint: NONE PT NONVERBAL AND FOLLOW NO VERBAL CUES  Patient comments/goals: NONE   Pain/Comfort:  · Pain Rating 1: 0/10  · Pain Rating Post-Intervention 1: 0/10    Patients cultural, spiritual, Church conflicts given the current situation:      Living Environment:  PT WAS AT NH FOR SNF CARE. P.T. AT NH REPORTED PT WAS ASSIST OF 1 AND AMBULATING WITH RW  Prior to admission, patients level of function was ASSIST NEEDED FOR GROSS FUNC MOBILITY.  Patient has the following equipment:  .  DME owned (not currently used): UNKNOWN.  Upon discharge, patient will have assistance from NH.    Objective:     Patient found with: NG tube, peripheral IV, telemetry, SCD     General Precautions: Standard, fall   Orthopedic Precautions:N/A   Braces:       Exams:  · PT IS NONVERBAL    Functional Mobility:  · PT PROM WFL HOWEVER NO AROM ASSESSED.    AM-PAC 6 CLICK MOBILITY  Total Score:6       Therapeutic Activities and Exercises:   P.T. COMPLETED PROM B UE AND LE. PT NONREPONSIVE    Patient left supine with bed alarm  on.    GOALS:    Physical Therapy Goals     Not on file                History:     Past Medical History:   Diagnosis Date    AIDS     Chronic back pain     Chronic hepatitis C     DDD (degenerative disc disease), lumbar     Diabetes mellitus     Hepatitis B antibody positive     History of anal cancer     HIV (human immunodeficiency virus infection)     Hypertension     Neuropathy     Obesity     Syphilis     Vitreous detachment of right eye        Past Surgical History:   Procedure Laterality Date    anal biopsy      COLONOSCOPY W/ BIOPSIES      ESOPHAGOGASTRODUODENOSCOPY      EYE SURGERY      MEDIPORT INSERTION, SINGLE      MEDIPORT REMOVAL         Clinical Decision Making:     History  Co-morbidities and personal factors that may impact the plan of care Examination  Body Structures and Functions, activity limitations and participation restrictions that may impact the plan of care Clinical Presentation   Decision Making/ Complexity Score   Co-morbidities:   [] Time since onset of injury / illness / exacerbation  [] Status of current condition  []Patient's cognitive status and safety concerns    [] Multiple Medical Problems (see med hx)  Personal Factors:   [] Patient's age  [] Prior Level of function   [] Patient's home situation (environment and family support)  [] Patient's level of motivation  [] Expected progression of patient      HISTORY:(criteria)    [] 84751 - no personal factors/history    [] 77157 - has 1-2 personal factor/comorbidity     [x] 68401 - has >3 personal factor/comorbidity     Body Regions:  [] Objective examination findings  [] Head     []  Neck  [] Trunk   [] Upper Extremity  [] Lower Extremity    Body Systems:  [] For communication ability, affect, cognition, language, and learning style: the assessment of the ability to make needs known, consciousness, orientation (person, place, and time), expected emotional /behavioral responses, and learning preferences (eg,  learning barriers, education  needs)  [] For the neuromuscular system: a general assessment of gross coordinated movement (eg, balance, gait, locomotion, transfers, and transitions) and motor function  (motor control and motor learning)  [] For the musculoskeletal system: the assessment of gross symmetry, gross range of motion, gross strength, height, and weight  [] For the integumentary system: the assessment of pliability(texture), presence of scar formation, skin color, and skin integrity  [] For cardiovascular/pulmonary system: the assessment of heart rate, respiratory rate, blood pressure, and edema     Activity limitations:    [] Patient's cognitive status and saf ety concerns          [] Status of current condition      [] Weight bearing restriction  [] Cardiopulmunary Restriction    Participation Restrictions:   [] Goals and goal agreement with the patient     [] Rehab potential (prognosis) and probable outcome      Examination of Body System: (criteria)    [] 89188 - addressing 1-2 elements    [] 93195 - addressing a total of 3 or more elements     [x] 57527 -  Addressing a total of 4 or more elements         Clinical Presentation: (criteria)  Unstable - 90584     On examination of body system using standardized tests and measures patient presents with (CHOOSE ONE) elements from any of the following: body structures and functions, activity limitations, and/or participation restrictions.  Leading to a clinical presentation that is considered (CHOOSE ONE)                              Clinical Decision Making  (Eval Complexity):  High - 48715     Time Tracking:     PT Received On: 12/12/17  PT Start Time: 1210     PT Stop Time: 1220  PT Total Time (min): 10 min     Billable Minutes: Evaluation 10      Preethi Rodriguez PT  12/12/2017

## 2017-12-12 NOTE — SUBJECTIVE & OBJECTIVE
Interval History: The patient is tolerating tube feeding. The patients total bili continues to worsen. The patient is more lethargic today. No family at bedside today to discuss plan of care. The patient did have a run of V-tach. Will check lytes.     Review of Systems   Constitutional: Positive for activity change, appetite change and fatigue. Negative for chills, fever and unexpected weight change.   HENT: Negative.  Negative for congestion, facial swelling, rhinorrhea, sinus pressure, sneezing and sore throat.    Eyes: Negative.  Negative for discharge, redness and visual disturbance.   Respiratory: Negative for apnea, cough, chest tightness, shortness of breath, wheezing and stridor.    Cardiovascular: Negative.  Negative for chest pain, palpitations and leg swelling.   Gastrointestinal: Positive for abdominal distention. Negative for abdominal pain, anal bleeding, blood in stool, constipation, diarrhea, nausea and vomiting.   Endocrine: Negative.    Genitourinary: Positive for decreased urine volume. Negative for difficulty urinating, discharge, dysuria, frequency and hematuria.   Musculoskeletal: Positive for gait problem. Negative for arthralgias, back pain, joint swelling, myalgias, neck pain and neck stiffness.   Skin: Positive for color change. Negative for pallor.   Neurological: Positive for speech difficulty and weakness. Negative for dizziness, tremors, seizures, syncope, facial asymmetry, light-headedness, numbness and headaches.   Psychiatric/Behavioral: Positive for decreased concentration. Negative for behavioral problems, confusion, hallucinations and suicidal ideas. The patient is not nervous/anxious.    All other systems reviewed and are negative.    Objective:     Vital Signs (Most Recent):  Temp: 98.7 °F (37.1 °C) (12/12/17 1557)  Pulse: (!) 114 (12/12/17 1557)  Resp: 20 (12/12/17 1557)  BP: 126/89 (12/12/17 1557)  SpO2: (!) 94 % (12/12/17 1557) Vital Signs (24h Range):  Temp:  [97 °F (36.1  °C)-98.7 °F (37.1 °C)] 98.7 °F (37.1 °C)  Pulse:  [100-119] 114  Resp:  [18-20] 20  SpO2:  [93 %-96 %] 94 %  BP: (125-147)/(79-94) 126/89     Weight: 90.5 kg (199 lb 8.3 oz)  Body mass index is 26.32 kg/m².    Intake/Output Summary (Last 24 hours) at 12/12/17 1725  Last data filed at 12/11/17 1807   Gross per 24 hour   Intake              300 ml   Output                0 ml   Net              300 ml      Physical Exam   Constitutional: He is oriented to person, place, and time. He appears cachectic. He is easily aroused. He does not have a sickly appearance. He appears ill. No distress.   Elderly man, lying in bed, eyes half closed but wakes up easily   HENT:   Head: Normocephalic and atraumatic.   Tongue dry and coated, mouth open. NGT secure with TF infusing    Eyes: Conjunctivae are normal. Pupils are equal, round, and reactive to light. Scleral icterus is present.   Neck: Normal range of motion. Neck supple. No tracheal deviation present. No thyromegaly present.   Cardiovascular: Normal rate, regular rhythm and normal heart sounds.    No murmur heard.  Pulmonary/Chest: Effort normal and breath sounds normal. He has no wheezes. He has no rales. He exhibits no tenderness.   Abdominal: Soft. Bowel sounds are normal. He exhibits distension. He exhibits no mass. There is no tenderness. There is no guarding.   Musculoskeletal: Normal range of motion. He exhibits edema.   Lymphadenopathy:     He has no cervical adenopathy.   Neurological: He is alert, oriented to person, place, and time and easily aroused. No cranial nerve deficit.   Skin: Skin is warm and dry. Capillary refill takes 2 to 3 seconds. He is not diaphoretic.   Nursing note and vitals reviewed.      Significant Labs: All pertinent labs within the past 24 hours have been reviewed.    Significant Imaging:   Imaging Results          X-Ray Chest 1 View (Final result)  Result time 12/09/17 20:15:37    Final result by Jeffrey Serna MD (12/09/17 20:15:37)                  Impression:     See above.      Electronically signed by: PATTI BELLAMY MD  Date:     12/09/17  Time:    20:15              Narrative:    Exam: Portable chest xray    History:    Encounter for nasogastric tube placement.    Findings:     The nasogastric tube tip projects in the left upper abdomen over the gastric bubble.                             US Guided Paracentesis (Final result)  Result time 12/08/17 16:51:40    Final result by Wilberto Lin III, MD (12/08/17 16:51:40)                 Impression:        1. successful diagnostic paracentesis with removal of 20 cc of ascites.      Electronically signed by: WILBERTO LIN MD  Date:     12/08/17  Time:    16:51              Narrative:    Ultrasound guided paracentesis.    Type indication: HIV. Abdominal pain.    Consent was obtained prior to the procedure.    The procedure was performed in conjunction with [facility]      Ultrasound showed a moderate volume of ascites. It was elected to utilize the right lower quadrant. The patient had a markedly low platelet count therefore only diagnostic paracentesis was performed with a 25-gauge needle.    The skin was prepped and draped in sterile fashion. 1% lidocaine without epinephrine was utilized for local anesthesia. A 25-gauge needle was advanced without difficulty into the peritoneal cavity. A total of 20 cc of light anastasiya colored fluid was removed for diagnostic purposes. The patient tolerated the procedure well without any immediate complications.                             CT Renal Stone Study ABD Pelvis WO (Edited Result - FINAL)  Result time 12/08/17 13:50:41    Addendum 1 of 1 by Wilberto Lin III, MD (12/08/17 13:50:41)    .     All CT scans at this facility use dose modulation, iterative reconstruction, and/or weight based dosing when appropriate to reduce radiation dose to as low as reasonably achievable.      Electronically signed by: WILBERTO LIN MD  Date:      12/08/17  Time:    13:50                Final result by Wilberto Lin III, MD (12/08/17 13:49:44)                 Impression:        1. Moderate volume of ascites.    2. Minimal pleural effusions with basilar infiltrates and/or atelectatic changes. Small noncalcified pulmonary nodule right base measures 1.3 cm in diameter.    3. Otherwise as above..       Electronically signed by: WILBERTO LIN MD  Date:     12/08/17  Time:    13:49              Narrative:    CT of the abdomen and pelvis without contrast.    Clinical indication: Abdominal pain.    No prior studies for comparison.    There are minimal pleural effusions, greater on the right with mild left perihilar/infrahilar and right basilar atelectatic change. Noncalcified nodule suggested posteriorly at the right base measuring approximately 1.3 cm in diameter.    Within the abdomen or pelvis there is no free intraperitoneal air or bowel obstruction. Bony windows show no acute abnormality. Degenerative changes noted, greatest in the lower lumbar spine.    Heart size is normal. There is a small pericardial effusion.    Within the upper abdomen the liver and spleen are not enlarged. There is no adrenal or pancreatic mass or enlargement. There is no solid renal mass or obstruction. There is atheromatous change along the aorta without gross evidence of aneurysm formation.    There is a moderate volume of ascites with fluid displacing the liver and spleen somewhat medially. Cannot exclude a small hiatal hernia. Exam limited by the lack of oral contrast.    Within the lower abdomen and pelvis, there is again evidence of a moderate volume of fluid in the pelvis. Bladder is unremarkable. Contrast with stool noted in the rectosigmoid. No gross abnormality in the right lower quadrant but suggest acute appendicitis.                             CT Head Without Contrast (Final result)  Result time 12/08/17 13:13:21    Final result by Wilberto Lin III,  MD (12/08/17 13:13:21)                 Impression:     Stable CT brain. Atrophy with bilateral white matter changes most likely related to microvascular disease. No bleed or other gross acute abnormality suggested.      All CT scans at this facility use dose modulation, iterative reconstruction, and/or weight based dosing when appropriate to reduce radiation dose to as low as reasonably achievable.      Electronically signed by: WILBERTO PACK MD  Date:     12/08/17  Time:    13:13              Narrative:    CT of the head without contrast.    Clinical indication: ams.Altered mental status    Standard noncontrast CT scan of the brain. Compared to November.      The brain and ventricles again show generalized changes of atrophy, both supra-and infratentorial. There are low-attenuation changes in the periventricular and subcortical white matter. No hemorrhage, mass lesion, hydrocephalus, or midline shift. No acute/depressed skull fracture. Scan is degraded by motion artifact.                             X-Ray Chest 1 View (Final result)  Result time 12/08/17 12:26:13    Final result by Wilberto Pack III, MD (12/08/17 12:26:13)                 Impression:         Negative one view chest x-ray.      Electronically signed by: WILBERTO PACK MD  Date:     12/08/17  Time:    12:26              Narrative:    One view chest x-ray.    Clinical indication: Weakness.    Heart size is normal. The lung fields are clear. No acute pulmonary infiltrate.

## 2017-12-12 NOTE — PROGRESS NOTES
"[facility] Hospital Medicine  Progress Note    Patient Name: Jeffrey Rosales  MRN: [id number]  Patient Class: IP- Inpatient   Admission Date: 12/8/2017  Length of Stay: 4 days  Attending Physician: Tommy Myles MD  Primary Care Provider: Provider [facility]        Subjective:     Principal Problem:Hepatic encephalopathy    HPI:  Mr. Rosales is a 67 yo male  with  HIV/AIDS, HCV cirrhosis, HTN, HLD, DM II, chronic back pain, PUD, and h/o anal cancer who presented to ED from [facility] with AMS and generalized weakness. The pt's family reports over the last 2 days, the pt had increasing confusion, agitation, and hallucinations. He also was cursing at staff and threatening to "punch" his son which is very out of character. Also, the family reports pt was refusing medications or eating.   Discharged 11/23/17 to SNF from hospital for PCP pneumonia. Had lumbar puncture during hospitalization for hallucinations and FELICITY virus and neuro syphilis ruled out       Hospital Course:  Mr. Rosales is a 67 yo male  with HIV/AIDS, HCV cirrhosis, HTN, HLD, DM II, chronic back pain, PUD, and h/o anal cancer who presented to ED from [facility] with AMS and generalized weakness x 2 days. Subsequently admitted and found to have hepatic encephalopathy with Ammonia 89-- treated with Lactulose. Last night also got a dose of Ativan 2 mg IV for agitation and since then the pt has been sleeping and has not taken any of his oral meds today including Lactulose. Family concerned. Pt is arousable with deep stimuli but falls back asleep. Repeat Ammonia was 28. LFts also decreased. GI consulted and his MELD score is 17. He appears to have poor prognosis due to his ES AIDs and Ch Hep C Cirrhosis with ascites but no SBP. Ordered NGT placement to give him Lactulose.    12/10-- remains groggy but more alert and responsive today, getting meds thru NG. No fever or chills, no flapping tremor. Ammonia was 28 " yesterday and today is 37, getting Lactulose via NG and had a large BM this am. Now starting water thru the NGT.     12/11-- appears awake but very weak, voice weak but speech clear, he appears exhausted and weak. NG in place. ALT/AST improving Bili rising. Daughter informs that he was not eating much for 3 days before coming and has not had much to eat here either. I expalined to the daughter ( POA) and pt's wife that his condition is worsening due to advanced liver failure as well as advanced ES AIDS but the daughter does not believe it, however mother/wife accepts and understands. TD starting now. Explained risk of aspiration to them as well.     12/12- The patient is tolerating tube feeding. The patients total bili continues to worsen. The patient is more lethargic today. No family at bedside today to discuss plan of care.     Interval History: The patient is tolerating tube feeding. The patients total bili continues to worsen. The patient is more lethargic today. No family at bedside today to discuss plan of care. The patient did have a run of V-tach. Will check lytes.     Review of Systems   Constitutional: Positive for activity change, appetite change and fatigue. Negative for chills, fever and unexpected weight change.   HENT: Negative.  Negative for congestion, facial swelling, rhinorrhea, sinus pressure, sneezing and sore throat.    Eyes: Negative.  Negative for discharge, redness and visual disturbance.   Respiratory: Negative for apnea, cough, chest tightness, shortness of breath, wheezing and stridor.    Cardiovascular: Negative.  Negative for chest pain, palpitations and leg swelling.   Gastrointestinal: Positive for abdominal distention. Negative for abdominal pain, anal bleeding, blood in stool, constipation, diarrhea, nausea and vomiting.   Endocrine: Negative.    Genitourinary: Positive for decreased urine volume. Negative for difficulty urinating, discharge, dysuria, frequency and hematuria.    Musculoskeletal: Positive for gait problem. Negative for arthralgias, back pain, joint swelling, myalgias, neck pain and neck stiffness.   Skin: Positive for color change. Negative for pallor.   Neurological: Positive for speech difficulty and weakness. Negative for dizziness, tremors, seizures, syncope, facial asymmetry, light-headedness, numbness and headaches.   Psychiatric/Behavioral: Positive for decreased concentration. Negative for behavioral problems, confusion, hallucinations and suicidal ideas. The patient is not nervous/anxious.    All other systems reviewed and are negative.    Objective:     Vital Signs (Most Recent):  Temp: 98.7 °F (37.1 °C) (12/12/17 1557)  Pulse: (!) 114 (12/12/17 1557)  Resp: 20 (12/12/17 1557)  BP: 126/89 (12/12/17 1557)  SpO2: (!) 94 % (12/12/17 1557) Vital Signs (24h Range):  Temp:  [97 °F (36.1 °C)-98.7 °F (37.1 °C)] 98.7 °F (37.1 °C)  Pulse:  [100-119] 114  Resp:  [18-20] 20  SpO2:  [93 %-96 %] 94 %  BP: (125-147)/(79-94) 126/89     Weight: 90.5 kg (199 lb 8.3 oz)  Body mass index is 26.32 kg/m².    Intake/Output Summary (Last 24 hours) at 12/12/17 1725  Last data filed at 12/11/17 1807   Gross per 24 hour   Intake              300 ml   Output                0 ml   Net              300 ml      Physical Exam   Constitutional: He is oriented to person, place, and time. He appears cachectic. He is easily aroused. He does not have a sickly appearance. He appears ill. No distress.   Elderly man, lying in bed, eyes half closed but wakes up easily   HENT:   Head: Normocephalic and atraumatic.   Tongue dry and coated, mouth open. NGT secure with TF infusing    Eyes: Conjunctivae are normal. Pupils are equal, round, and reactive to light. Scleral icterus is present.   Neck: Normal range of motion. Neck supple. No tracheal deviation present. No thyromegaly present.   Cardiovascular: Normal rate, regular rhythm and normal heart sounds.    No murmur heard.  Pulmonary/Chest: Effort normal  and breath sounds normal. He has no wheezes. He has no rales. He exhibits no tenderness.   Abdominal: Soft. Bowel sounds are normal. He exhibits distension. He exhibits no mass. There is no tenderness. There is no guarding.   Musculoskeletal: Normal range of motion. He exhibits edema.   Lymphadenopathy:     He has no cervical adenopathy.   Neurological: He is alert, oriented to person, place, and time and easily aroused. No cranial nerve deficit.   Skin: Skin is warm and dry. Capillary refill takes 2 to 3 seconds. He is not diaphoretic.   Nursing note and vitals reviewed.      Significant Labs: All pertinent labs within the past 24 hours have been reviewed.    Significant Imaging:   Imaging Results          X-Ray Chest 1 View (Final result)  Result time 12/09/17 20:15:37    Final result by Patti Bellamy MD (12/09/17 20:15:37)                 Impression:     See above.      Electronically signed by: PATTI BELLAMY MD  Date:     12/09/17  Time:    20:15              Narrative:    Exam: Portable chest xray    History:    Encounter for nasogastric tube placement.    Findings:     The nasogastric tube tip projects in the left upper abdomen over the gastric bubble.                             US Guided Paracentesis (Final result)  Result time 12/08/17 16:51:40    Final result by Wilberto Lin III, MD (12/08/17 16:51:40)                 Impression:        1. successful diagnostic paracentesis with removal of 20 cc of ascites.      Electronically signed by: WILBERTO LIN MD  Date:     12/08/17  Time:    16:51              Narrative:    Ultrasound guided paracentesis.    Type indication: HIV. Abdominal pain.    Consent was obtained prior to the procedure.    The procedure was performed in conjunction with [facility]      Ultrasound showed a moderate volume of ascites. It was elected to utilize the right lower quadrant. The patient had a markedly low platelet count therefore only diagnostic paracentesis was  performed with a 25-gauge needle.    The skin was prepped and draped in sterile fashion. 1% lidocaine without epinephrine was utilized for local anesthesia. A 25-gauge needle was advanced without difficulty into the peritoneal cavity. A total of 20 cc of light anastasiya colored fluid was removed for diagnostic purposes. The patient tolerated the procedure well without any immediate complications.                             CT Renal Stone Study ABD Pelvis WO (Edited Result - FINAL)  Result time 12/08/17 13:50:41    Addendum 1 of 1 by Wilberto Lin III, MD (12/08/17 13:50:41)    .     All CT scans at this facility use dose modulation, iterative reconstruction, and/or weight based dosing when appropriate to reduce radiation dose to as low as reasonably achievable.      Electronically signed by: WILBERTO LIN MD  Date:     12/08/17  Time:    13:50                Final result by Wilberto Lin III, MD (12/08/17 13:49:44)                 Impression:        1. Moderate volume of ascites.    2. Minimal pleural effusions with basilar infiltrates and/or atelectatic changes. Small noncalcified pulmonary nodule right base measures 1.3 cm in diameter.    3. Otherwise as above..       Electronically signed by: WILBERTO LIN MD  Date:     12/08/17  Time:    13:49              Narrative:    CT of the abdomen and pelvis without contrast.    Clinical indication: Abdominal pain.    No prior studies for comparison.    There are minimal pleural effusions, greater on the right with mild left perihilar/infrahilar and right basilar atelectatic change. Noncalcified nodule suggested posteriorly at the right base measuring approximately 1.3 cm in diameter.    Within the abdomen or pelvis there is no free intraperitoneal air or bowel obstruction. Bony windows show no acute abnormality. Degenerative changes noted, greatest in the lower lumbar spine.    Heart size is normal. There is a small pericardial effusion.    Within  the upper abdomen the liver and spleen are not enlarged. There is no adrenal or pancreatic mass or enlargement. There is no solid renal mass or obstruction. There is atheromatous change along the aorta without gross evidence of aneurysm formation.    There is a moderate volume of ascites with fluid displacing the liver and spleen somewhat medially. Cannot exclude a small hiatal hernia. Exam limited by the lack of oral contrast.    Within the lower abdomen and pelvis, there is again evidence of a moderate volume of fluid in the pelvis. Bladder is unremarkable. Contrast with stool noted in the rectosigmoid. No gross abnormality in the right lower quadrant but suggest acute appendicitis.                             CT Head Without Contrast (Final result)  Result time 12/08/17 13:13:21    Final result by Wilberto Lin III, MD (12/08/17 13:13:21)                 Impression:     Stable CT brain. Atrophy with bilateral white matter changes most likely related to microvascular disease. No bleed or other gross acute abnormality suggested.      All CT scans at this facility use dose modulation, iterative reconstruction, and/or weight based dosing when appropriate to reduce radiation dose to as low as reasonably achievable.      Electronically signed by: WILBERTO LIN MD  Date:     12/08/17  Time:    13:13              Narrative:    CT of the head without contrast.    Clinical indication: ams.Altered mental status    Standard noncontrast CT scan of the brain. Compared to November.      The brain and ventricles again show generalized changes of atrophy, both supra-and infratentorial. There are low-attenuation changes in the periventricular and subcortical white matter. No hemorrhage, mass lesion, hydrocephalus, or midline shift. No acute/depressed skull fracture. Scan is degraded by motion artifact.                             X-Ray Chest 1 View (Final result)  Result time 12/08/17 12:26:13    Final result by Wilberto  KATHRYN Lin III, MD (12/08/17 12:26:13)                 Impression:         Negative one view chest x-ray.      Electronically signed by: EVER LIN MD  Date:     12/08/17  Time:    12:26              Narrative:    One view chest x-ray.    Clinical indication: Weakness.    Heart size is normal. The lung fields are clear. No acute pulmonary infiltrate.                                 Assessment/Plan:      * Hepatic encephalopathy    Acute hepatic encephalopathy - may have metabolic encephalopathy as well  Had a recent Lumbar puncture -negative for VDRL and FELICITY virus  Lactulose  GI consult     12/9-- Apparently better, Ammonia level down   However still has persistent somnolence likely due to Ativan given for agitation      12/10- improving, ammonia Normal x 2 days  Hypersomnolence due to Ativan    12/11- eyes open but still lethargic and listless, emaciated  No s/s of encephalopathy    12/12 Pt remains lethargic. 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 (HCV)    Discussed with GI   R/O SBP first  See above     Mild Ascites but no Obvious SBP-- MELD 17    As above            AIDS (acquired immunodeficiency syndrome), CD4 <=200    Recently restarted HAART  Consult ID  Cont bactrim     Appears ES AIDS, CD4 only 3  Recommended Hospice to family but daughter (POA) not accepting yet    Continue HAART tx via NG    Appears to be slowly dying/ terminal stage  Hospice appropriate but the daughter who is the POA wont accept or listen anything about it  He remains full code as well            Recent Hx of PCP Pneumonia withy acute Hypoxemic Resp Failure     Bactrim     Appears stable   Continue  present care    Still on Bactrim and Zithromax            VTE Risk Mitigation         Ordered     Medium Risk of VTE  Once      12/08/17 1843     Reason for No Pharmacological VTE Prophylaxis  Once      12/08/17 1843              Augie Birch NP  Department of Hospital Medicine   [facility]

## 2017-12-12 NOTE — ASSESSMENT & PLAN NOTE
Acute hepatic encephalopathy - may have metabolic encephalopathy as well  Had a recent Lumbar puncture -negative for VDRL and FELICITY virus  Lactulose  GI consult     12/9-- Apparently better, Ammonia level down   However still has persistent somnolence likely due to Ativan given for agitation      12/10- improving, ammonia Normal x 2 days  Hypersomnolence due to Ativan    12/11- eyes open but still lethargic and listless, emaciated  No s/s of encephalopathy    12/12 Pt remains lethargic. Ammonia 75  Continue lactulose

## 2017-12-13 NOTE — PLAN OF CARE
Problem: Patient Care Overview  Goal: Plan of Care Review  PT IS NONVERBAL AND REQUIRES TOTAL CARE AT THIS TIME. PT IS NOT APPROPRIATE FOR P.T. AND WILL BE D/C TO MOBILITY PROGRAM FOR PROM   Outcome: Ongoing (interventions implemented as appropriate)  POC reviewed with patient, verbalized understanding. Patient remains free from falls. Fall precautions in place. ST on cardiac monitor, rate 110-120. VSS. Tube feeding infusing at 35 ml/hr. Goal rate is 40 ml/hr. Tolerating well. Wife and daughter at bedside, updated on POC. No other c/o at this time. Call bell within reach. Reminded to call for assistance.

## 2017-12-13 NOTE — PROGRESS NOTES
Spoke with Dr. Myles, requested he review wound care order awaiting Second Sign.  He states he will.

## 2017-12-13 NOTE — ASSESSMENT & PLAN NOTE
MELD-Na score: 28 at 12/13/2017  5:57 AM  MELD score: 28 at 12/13/2017  5:57 AM  Calculated from:  Serum Creatinine: 2.0 mg/dL at 12/13/2017  5:57 AM  Serum Sodium: 140 mmol/L (Rounded to 137) at 12/13/2017  5:57 AM  Total Bilirubin: 5.8 mg/dL at 12/13/2017  5:57 AM  INR(ratio): 2.1 at 12/13/2017  5:57 AM  Age: 66 years    Lab Results   Component Value Date    [id number] 3.8 (L) 11/12/2017       Patient with end stage AIDS and chronic liver disease due to chronic Hepatitis C. Continue with lactulose.     Prognosis is guarded.

## 2017-12-13 NOTE — ASSESSMENT & PLAN NOTE
Acute hepatic encephalopathy - may have metabolic encephalopathy as well  Had a recent Lumbar puncture -negative for VDRL and FELICITY virus  Lactulose  GI consult     12/9-- Apparently better, Ammonia level down   However still has persistent somnolence likely due to Ativan given for agitation      12/10- improving, ammonia Normal x 2 days  Hypersomnolence due to Ativan    12/11- eyes open but still lethargic and listless, emaciated  No s/s of encephalopathy    12/12 Pt remains lethargic. Ammonia 75  Continue lactulose  12/13 The patient remains lethargic. Progressively worsening liver function, MELD is now 28. Discussed poor prognosis with ex-wife, she states that the daughter Sariah is the POA. GI/Infectious disease consulted.   Ammonia level is now 100

## 2017-12-13 NOTE — PROGRESS NOTES
Tube feeding residual checked. 65ml noted. Tube feeding increased to 35 ml/hr. Goal rate is 40 ml/hr. Will reassess.

## 2017-12-13 NOTE — PROGRESS NOTES
Pharmacist Renal Dose Adjustment Note    Jeffrey Roasles is a 66 y.o. male being treated with the medication Pepcid    Patient Data:    Vital Signs (Most Recent):  Temp: 96.6 °F (35.9 °C) (12/13/17 1204)  Pulse: (!) 116 (12/13/17 1242)  Resp: 20 (12/13/17 1242)  BP: 118/77 (12/13/17 1204)  SpO2: (!) 94 % (12/13/17 1242)   Vital Signs (72h Range):  Temp:  [96.6 °F (35.9 °C)-98.9 °F (37.2 °C)]   Pulse:  []   Resp:  [16-22]   BP: (118-147)/(77-97)   SpO2:  [91 %-97 %]        Recent Labs     Lab 12/12/17  0533 12/12/17 2034 12/13/17  0557   CREATININE 1.6* 1.8* 2.0*     Serum creatinine: 2 mg/dL High 12/13/17 0557  Estimated creatinine clearance: 40.5 mL/min    Medication:Pepcid dose: 20mg frequency BID will be changed to medication:Pepcid dose:20mg frequency:QD    Pharmacist's Name: Liz Tamayo  Pharmacist's Extension: [phone]

## 2017-12-13 NOTE — PLAN OF CARE
Problem: Patient Care Overview  Goal: Plan of Care Review  PT IS NONVERBAL AND REQUIRES TOTAL CARE AT THIS TIME. PT IS NOT APPROPRIATE FOR P.T. AND WILL BE D/C TO MOBILITY PROGRAM FOR PROM   Outcome: Ongoing (interventions implemented as appropriate)  Patient on room air tolerating well. No distress noted at this time.

## 2017-12-13 NOTE — PROGRESS NOTES
Tube feeding residual checked. >300ml noted. Decreased feed back down to 30 ml/hr. Will recheck at 0600.

## 2017-12-13 NOTE — PROGRESS NOTES
"[facility] Hospital Medicine  Progress Note    Patient Name: Jeffrey Rosales  MRN: [id number]  Patient Class: IP- Inpatient   Admission Date: 12/8/2017  Length of Stay: 5 days  Attending Physician: Tommy Myles MD  Primary Care Provider: Provider [facility]        Subjective:     Principal Problem:Hepatic encephalopathy    HPI:  Mr. Rosales is a 67 yo male  with  HIV/AIDS, HCV cirrhosis, HTN, HLD, DM II, chronic back pain, PUD, and h/o anal cancer who presented to ED from [facility] with AMS and generalized weakness. The pt's family reports over the last 2 days, the pt had increasing confusion, agitation, and hallucinations. He also was cursing at staff and threatening to "punch" his son which is very out of character. Also, the family reports pt was refusing medications or eating.   Discharged 11/23/17 to SNF from hospital for PCP pneumonia. Had lumbar puncture during hospitalization for hallucinations and FELICITY virus and neuro syphilis ruled out       Hospital Course:  Mr. Rosales is a 67 yo male  with HIV/AIDS, HCV cirrhosis, HTN, HLD, DM II, chronic back pain, PUD, and h/o anal cancer who presented to ED from [facility] with AMS and generalized weakness x 2 days. Subsequently admitted and found to have hepatic encephalopathy with Ammonia 89-- treated with Lactulose. Last night also got a dose of Ativan 2 mg IV for agitation and since then the pt has been sleeping and has not taken any of his oral meds today including Lactulose. Family concerned. Pt is arousable with deep stimuli but falls back asleep. Repeat Ammonia was 28. LFts also decreased. GI consulted and his MELD score is 17. He appears to have poor prognosis due to his ES AIDs and Ch Hep C Cirrhosis with ascites but no SBP. Ordered NGT placement to give him Lactulose.    12/10-- remains groggy but more alert and responsive today, getting meds thru NG. No fever or chills, no flapping tremor. Ammonia was 28 " "yesterday and today is 37, getting Lactulose via NG and had a large BM this am. Now starting water thru the NGT.     12/11-- appears awake but very weak, voice weak but speech clear, he appears exhausted and weak. NG in place. ALT/AST improving Bili rising. Daughter informs that he was not eating much for 3 days before coming and has not had much to eat here either. I expalined to the daughter ( POA) and pt's wife that his condition is worsening due to advanced liver failure as well as advanced ES AIDS but the daughter does not believe it, however mother/wife accepts and understands. TD starting now. Explained risk of aspiration to them as well.     12/12- The patient is tolerating tube feeding. The patients total bili continues to worsen. The patient is more lethargic today. No family at bedside today to discuss plan of care.     12/13 Progressively worsening liver function, MELD is now 28. Discussed poor prognosis with ex-wife, she states that the daughter Sariah is the POA and she wants "everything done". GI/Infectious disease consulted.     Interval History: Progressively worsening liver function, MELD is now 28. Discussed poor prognosis with ex-wife, she states that the daughter Sariah is the POA. GI/Infectious disease consulted.       Review of Systems   Constitutional: Positive for activity change, appetite change and fatigue. Negative for chills, fever and unexpected weight change.   HENT: Negative.  Negative for congestion, facial swelling, rhinorrhea, sinus pressure, sneezing and sore throat.    Eyes: Negative.  Negative for discharge, redness and visual disturbance.   Respiratory: Negative for apnea, cough, chest tightness, shortness of breath, wheezing and stridor.    Cardiovascular: Negative.  Negative for chest pain, palpitations and leg swelling.   Gastrointestinal: Positive for abdominal distention. Negative for abdominal pain, anal bleeding, blood in stool, constipation, diarrhea, nausea and " vomiting.   Endocrine: Negative.    Genitourinary: Positive for decreased urine volume. Negative for difficulty urinating, discharge, dysuria, frequency and hematuria.   Musculoskeletal: Positive for gait problem. Negative for arthralgias, back pain, joint swelling, myalgias, neck pain and neck stiffness.   Skin: Positive for color change. Negative for pallor.   Neurological: Positive for speech difficulty and weakness. Negative for dizziness, tremors, seizures, syncope, facial asymmetry, light-headedness, numbness and headaches.   Psychiatric/Behavioral: Positive for decreased concentration. Negative for behavioral problems, confusion, hallucinations and suicidal ideas. The patient is not nervous/anxious.    All other systems reviewed and are negative.    Objective:     Vital Signs (Most Recent):  Temp: 96.2 °F (35.7 °C) (12/13/17 1618)  Pulse: (!) 118 (12/13/17 1618)  Resp: 20 (12/13/17 1618)  BP: 117/74 (12/13/17 1618)  SpO2: (!) 93 % (12/13/17 1618) Vital Signs (24h Range):  Temp:  [96.2 °F (35.7 °C)-98 °F (36.7 °C)] 96.2 °F (35.7 °C)  Pulse:  [107-124] 118  Resp:  [18-22] 20  SpO2:  [91 %-94 %] 93 %  BP: (117-147)/(74-94) 117/74     Weight: 89.6 kg (197 lb 8.5 oz)  Body mass index is 26.06 kg/m².    Intake/Output Summary (Last 24 hours) at 12/13/17 1653  Last data filed at 12/13/17 0745   Gross per 24 hour   Intake              630 ml   Output              400 ml   Net              230 ml      Physical Exam   Constitutional: He is oriented to person, place, and time. He appears cachectic. He is easily aroused. He does not have a sickly appearance. He appears ill. No distress.   Elderly man, lying in bed, eyes half closed but wakes up easily   HENT:   Head: Normocephalic and atraumatic.   Tongue dry and coated, mouth open. NGT secure with TF infusing    Eyes: Conjunctivae are normal. Pupils are equal, round, and reactive to light. Scleral icterus is present.   Neck: Normal range of motion. Neck supple. No  tracheal deviation present. No thyromegaly present.   Cardiovascular: Normal rate, regular rhythm and normal heart sounds.    No murmur heard.  Pulmonary/Chest: Effort normal and breath sounds normal. He has no wheezes. He has no rales. He exhibits no tenderness.   Abdominal: Soft. Bowel sounds are normal. He exhibits distension. He exhibits no mass. There is no tenderness. There is no guarding.   Musculoskeletal: Normal range of motion. He exhibits edema.   Lymphadenopathy:     He has no cervical adenopathy.   Neurological: He is alert, oriented to person, place, and time and easily aroused. No cranial nerve deficit.   Skin: Skin is warm and dry. Capillary refill takes 2 to 3 seconds. He is not diaphoretic.   Nursing note and vitals reviewed.      Significant Labs: All pertinent labs within the past 24 hours have been reviewed.    Significant Imaging:   Imaging Results          X-Ray Chest 1 View (Final result)  Result time 12/09/17 20:15:37    Final result by Patti Bellamy MD (12/09/17 20:15:37)                 Impression:     See above.      Electronically signed by: PATTI BELLAMY MD  Date:     12/09/17  Time:    20:15              Narrative:    Exam: Portable chest xray    History:    Encounter for nasogastric tube placement.    Findings:     The nasogastric tube tip projects in the left upper abdomen over the gastric bubble.                             US Guided Paracentesis (Final result)  Result time 12/08/17 16:51:40    Final result by Wilberto Lin III, MD (12/08/17 16:51:40)                 Impression:        1. successful diagnostic paracentesis with removal of 20 cc of ascites.      Electronically signed by: WILBERTO LIN MD  Date:     12/08/17  Time:    16:51              Narrative:    Ultrasound guided paracentesis.    Type indication: HIV. Abdominal pain.    Consent was obtained prior to the procedure.    The procedure was performed in conjunction with case [place]      Ultrasound showed a  moderate volume of ascites. It was elected to utilize the right lower quadrant. The patient had a markedly low platelet count therefore only diagnostic paracentesis was performed with a 25-gauge needle.    The skin was prepped and draped in sterile fashion. 1% lidocaine without epinephrine was utilized for local anesthesia. A 25-gauge needle was advanced without difficulty into the peritoneal cavity. A total of 20 cc of light anastasiya colored fluid was removed for diagnostic purposes. The patient tolerated the procedure well without any immediate complications.                             CT Renal Stone Study ABD Pelvis WO (Edited Result - FINAL)  Result time 12/08/17 13:50:41    Addendum 1 of 1 by Wilberto Lin III, MD (12/08/17 13:50:41)    .     All CT scans at this facility use dose modulation, iterative reconstruction, and/or weight based dosing when appropriate to reduce radiation dose to as low as reasonably achievable.      Electronically signed by: WILBERTO LIN MD  Date:     12/08/17  Time:    13:50                Final result by Wilberto Lin III, MD (12/08/17 13:49:44)                 Impression:        1. Moderate volume of ascites.    2. Minimal pleural effusions with basilar infiltrates and/or atelectatic changes. Small noncalcified pulmonary nodule right base measures 1.3 cm in diameter.    3. Otherwise as above..       Electronically signed by: WILBERTO LIN MD  Date:     12/08/17  Time:    13:49              Narrative:    CT of the abdomen and pelvis without contrast.    Clinical indication: Abdominal pain.    No prior studies for comparison.    There are minimal pleural effusions, greater on the right with mild left perihilar/infrahilar and right basilar atelectatic change. Noncalcified nodule suggested posteriorly at the right base measuring approximately 1.3 cm in diameter.    Within the abdomen or pelvis there is no free intraperitoneal air or bowel obstruction. Bony  windows show no acute abnormality. Degenerative changes noted, greatest in the lower lumbar spine.    Heart size is normal. There is a small pericardial effusion.    Within the upper abdomen the liver and spleen are not enlarged. There is no adrenal or pancreatic mass or enlargement. There is no solid renal mass or obstruction. There is atheromatous change along the aorta without gross evidence of aneurysm formation.    There is a moderate volume of ascites with fluid displacing the liver and spleen somewhat medially. Cannot exclude a small hiatal hernia. Exam limited by the lack of oral contrast.    Within the lower abdomen and pelvis, there is again evidence of a moderate volume of fluid in the pelvis. Bladder is unremarkable. Contrast with stool noted in the rectosigmoid. No gross abnormality in the right lower quadrant but suggest acute appendicitis.                             CT Head Without Contrast (Final result)  Result time 12/08/17 13:13:21    Final result by Wilberto Lin III, MD (12/08/17 13:13:21)                 Impression:     Stable CT brain. Atrophy with bilateral white matter changes most likely related to microvascular disease. No bleed or other gross acute abnormality suggested.      All CT scans at this facility use dose modulation, iterative reconstruction, and/or weight based dosing when appropriate to reduce radiation dose to as low as reasonably achievable.      Electronically signed by: WILBERTO LIN MD  Date:     12/08/17  Time:    13:13              Narrative:    CT of the head without contrast.    Clinical indication: ams.Altered mental status    Standard noncontrast CT scan of the brain. Compared to November.      The brain and ventricles again show generalized changes of atrophy, both supra-and infratentorial. There are low-attenuation changes in the periventricular and subcortical white matter. No hemorrhage, mass lesion, hydrocephalus, or midline shift. No  acute/depressed skull fracture. Scan is degraded by motion artifact.                             X-Ray Chest 1 View (Final result)  Result time 12/08/17 12:26:13    Final result by Wilberto Lin III, MD (12/08/17 12:26:13)                 Impression:         Negative one view chest x-ray.      Electronically signed by: WILBERTO LIN MD  Date:     12/08/17  Time:    12:26              Narrative:    One view chest x-ray.    Clinical indication: Weakness.    Heart size is normal. The lung fields are clear. No acute pulmonary infiltrate.                                 Assessment/Plan:      * Hepatic encephalopathy    Acute hepatic encephalopathy - may have metabolic encephalopathy as well  Had a recent Lumbar puncture -negative for VDRL and FELICITY virus  Lactulose  GI consult     12/9-- Apparently better, Ammonia level down   However still has persistent somnolence likely due to Ativan given for agitation      12/10- improving, ammonia Normal x 2 days  Hypersomnolence due to Ativan    12/11- eyes open but still lethargic and listless, emaciated  No s/s of encephalopathy    12/12 Pt remains lethargic. Ammonia 75  Continue lactulose  12/13 The patient remains lethargic. Progressively worsening liver function, MELD is now 28. Discussed poor prognosis with ex-wife, she states that the daughter Sariah is the POA. GI/Infectious disease consulted.   Ammonia level is now 100          Cirrhosis of liver with ascites    As above    Will start Lasix/ Aldactone once pt more alert and responsive        Abnormal liver enzymes    Improving ALT/AST but Bili rising            Hypercalcemia    Likely secondary to dehydration   IV hydration given  IV albumin             Pressure ulcer of coccygeal region, stage 3    Wound care        Decompensated cirrhosis related to hepatitis C virus (HCV)    GI re-consulted      MELD 28    As above            AIDS (acquired immunodeficiency syndrome), CD4 <=200    Recently restarted  HAART  Consult ID  Cont bactrim     Appears ES AIDS, CD4 only 3  Recommended Hospice to family but daughter (POA) not accepting yet    Continue HAART tx via NG    Appears to be slowly dying/ terminal stage  Hospice appropriate but the daughter who is the POA wont accept or listen anything about it  He remains full code as well            Recent Hx of PCP Pneumonia withy acute Hypoxemic Resp Failure     Bactrim     Appears stable   Continue present care    Still on Bactrim and Zithromax            VTE Risk Mitigation         Ordered     Medium Risk of VTE  Once      12/08/17 1843     Reason for No Pharmacological VTE Prophylaxis  Once      12/08/17 1843              Augie Birch NP  Department of Hospital Medicine   [facility]

## 2017-12-13 NOTE — PLAN OF CARE
Problem: Patient Care Overview  Goal: Plan of Care Review  PT IS NONVERBAL AND REQUIRES TOTAL CARE AT THIS TIME. PT IS NOT APPROPRIATE FOR P.T. AND WILL BE D/C TO MOBILITY PROGRAM FOR PROM   Outcome: Ongoing (interventions implemented as appropriate)  Pt remains free of falls and free of injury this shift. Pt's daughter's mother has been found to be feeding pt ice chips on several occasions - educated repeatedly and indicates that she will stop. Pt seems more alert throughout shift, though unlike yesterday, has not even mumbled to me.  Tube feeding bag and tubing changed at 1800. Residual 75ml - returned.  Bed in low position, side rails up x2, call light in reach, bed alarm on.

## 2017-12-13 NOTE — CONSULTS
[facility] -   Gastroenterology  Consult Note    Patient Name: Jeffrey Rosales  MRN: [id number]  Admission Date: 12/8/2017  Hospital Length of Stay: 5 days  Code Status: Full Code   Attending Provider: Tommy Myles MD   Consulting Provider: Jeremy Li MD  Primary Care Physician: Provider Notinsystem  Principal Problem:Hepatic encephalopathy    Inpatient consult to Gastroenterology  Consult performed by: JEREMY LI  Consult ordered by: BRYANNA ATKINSON  Reason for consult: Cirrhosis        Please my initial consult from this admit in the chart.     Subjective:     HPI:  Please see below my original consult HPI note.     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 All of his work up has been done at another facility and he is not a candidate for OLTx. He is deteriorating and not much we can offer. He remains somnolent and now has an NG tube placed for medications.       Patient is a 66 year old male who has multiple medical problems including advanced AIDS who last month was admitted with PCP Pneumonia. He was discharged to rehab facility and comes to our ED with mental status changes, somnolent and barely responsive. A CT scan of the abdomen showed small to moderate amount of ascites and a paracentesis was performed yesterday. Total cell count was 300's with a PMN's of 11% (<250) and it is not consistent with SBP. Cultures are pending. I discussed with son and wife in the room and explained the situation. His prognosis is not good.     There is no report of bleeding or GI distress.     The main reasons for Rosita systemic encephalopathy in  patient with cirrhosis:  - Infections  - Bleeding  - medications    Patient had EGD and Colonoscopy recently at another facility. I have reviewed his outpatient medications.       Past Medical History:   Diagnosis Date    AIDS     Chronic back pain     Chronic hepatitis C     DDD (degenerative disc disease), lumbar     Diabetes mellitus     Hepatitis B antibody positive     History of anal cancer     HIV (human immunodeficiency virus infection)     Hypertension     Neuropathy     Obesity     Syphilis     Vitreous detachment of right eye        Past Surgical History:   Procedure Laterality Date    anal biopsy      COLONOSCOPY W/ BIOPSIES      ESOPHAGOGASTRODUODENOSCOPY      EYE SURGERY      MEDIPORT INSERTION, SINGLE      MEDIPORT REMOVAL         Review of patient's allergies indicates:  No Known Allergies  Family History     Problem Relation (Age of Onset)    Diabetes Sister, Brother    No Known Problems Mother, Father        Social History Main Topics    Smoking status: Former Smoker     Quit date: 1999    Smokeless tobacco: Never Used    Alcohol use Yes      Comment: occasional    Drug use: No    Sexual activity: No     Review of Systems   Unable to perform ROS: Mental status change     Objective:     Vital Signs (Most Recent):  Temp: 96.2 °F (35.7 °C) (12/13/17 1618)  Pulse: (!) 118 (12/13/17 1618)  Resp: 20 (12/13/17 1618)  BP: 117/74 (12/13/17 1618)  SpO2: (!) 93 % (12/13/17 1618) Vital Signs (24h Range):  Temp:  [96.2 °F (35.7 °C)-98 °F (36.7 °C)] 96.2 °F (35.7 °C)  Pulse:  [107-123] 118  Resp:  [18-22] 20  SpO2:  [91 %-94 %] 93 %  BP: (117-147)/(74-94) 117/74     Weight: 89.6 kg (197 lb 8.5 oz) (12/13/17 0410)  Body mass index is 26.06 kg/m².      Intake/Output Summary (Last 24 hours) at 12/13/17 6928  Last data filed at 12/13/17 2216   Gross per 24 hour   Intake              630 ml   Output              400 ml   Net              230 ml       Lines/Drains/Airways     Drain                  NG/OG Tube 12/09/17 1800 nasogastric;nonweighted 16 Fr. Left nostril 3 days          Pressure Ulcer                 Pressure Ulcer 11/12/17 2021 posterior sacral spine Stage III 30 days          Peripheral Intravenous Line                 Peripheral IV - Single Lumen 12/08/17 1225 Right Hand 5 days                Physical Exam    Significant Labs:  CBC:   Recent Labs  Lab 12/12/17  0533 12/13/17  0557   WBC 5.68 5.80   HGB 15.8 15.9   HCT 46.3 45.8   PLT 42* 45*     BMP:   Recent Labs  Lab 12/13/17  0557   *      K 3.3*   *   CO2 18*   BUN 52*   CREATININE 2.0*   CALCIUM 11.7*     Coagulation:   Recent Labs  Lab 12/13/17  0557   INR 2.1*     Liver Function Test:   Recent Labs  Lab 12/12/17  0533 12/12/17 2034 12/13/17  0557   * 140* 132*   * 139* 123*   ALKPHOS 331* 345* 350*   BILITOT 5.9* 6.2* 5.8*   PROT 5.6* 5.7* 5.6*   ALBUMIN 2.1* 2.1* 2.0*       Significant Imaging:  Imaging results within the past 24 hours have been reviewed.    Assessment/Plan:     * Hepatic encephalopathy    MELD-Na score: 28 at 12/13/2017  5:57 AM  MELD score: 28 at 12/13/2017  5:57 AM  Calculated from:  Serum Creatinine: 2.0 mg/dL at 12/13/2017  5:57 AM  Serum Sodium: 140 mmol/L (Rounded to 137) at 12/13/2017  5:57 AM  Total Bilirubin: 5.8 mg/dL at 12/13/2017  5:57 AM  INR(ratio): 2.1 at 12/13/2017  5:57 AM  Age: 66 years    Lab Results   Component Value Date    [id number] 3.8 (L) 11/12/2017       Patient with end stage AIDS and chronic liver disease due to chronic Hepatitis C. Continue with lactulose.     Prognosis is guarded.         AIDS (acquired immunodeficiency syndrome), CD4 <=200    Part of mental status changes could be from advanced AIDS. Work up by  in progress.         Recent Hx of PCP Pneumonia withy acute Hypoxemic Resp Failure     HM managing and treating.         Cirrhosis of liver with ascites    Chronic condition. Patient is not a candidate for liver transplant at this point.  Continue monitoring.        Decompensated cirrhosis related to hepatitis C virus (HCV)    Patient needs daily labs to monitor synthetic liver functions.         Hypercalcemia    HM managing.         Abnormal liver enzymes    Due to cirrhosis and chronic HCV.           I discussed with patient's wife and explained the situation.     Thank you for your consult. I will sign off. Please contact us if you have any additional questions.    Jeremy Fletcher MD  Gastroenterology  [facility] [place]

## 2017-12-13 NOTE — SUBJECTIVE & OBJECTIVE
Past Medical History:   Diagnosis Date    AIDS     Chronic back pain     Chronic hepatitis C     DDD (degenerative disc disease), lumbar     Diabetes mellitus     Hepatitis B antibody positive     History of anal cancer     HIV (human immunodeficiency virus infection)     Hypertension     Neuropathy     Obesity     Syphilis     Vitreous detachment of right eye        Past Surgical History:   Procedure Laterality Date    anal biopsy      COLONOSCOPY W/ BIOPSIES      ESOPHAGOGASTRODUODENOSCOPY      EYE SURGERY      MEDIPORT INSERTION, SINGLE      MEDIPORT REMOVAL         Review of patient's allergies indicates:  No Known Allergies  Family History     Problem Relation (Age of Onset)    Diabetes Sister, Brother    No Known Problems Mother, Father        Social History Main Topics    Smoking status: Former Smoker     Quit date: 1999    Smokeless tobacco: Never Used    Alcohol use Yes      Comment: occasional    Drug use: No    Sexual activity: No     Review of Systems   Unable to perform ROS: Mental status change     Objective:     Vital Signs (Most Recent):  Temp: 96.2 °F (35.7 °C) (12/13/17 1618)  Pulse: (!) 118 (12/13/17 1618)  Resp: 20 (12/13/17 1618)  BP: 117/74 (12/13/17 1618)  SpO2: (!) 93 % (12/13/17 1618) Vital Signs (24h Range):  Temp:  [96.2 °F (35.7 °C)-98 °F (36.7 °C)] 96.2 °F (35.7 °C)  Pulse:  [107-123] 118  Resp:  [18-22] 20  SpO2:  [91 %-94 %] 93 %  BP: (117-147)/(74-94) 117/74     Weight: 89.6 kg (197 lb 8.5 oz) (12/13/17 0410)  Body mass index is 26.06 kg/m².      Intake/Output Summary (Last 24 hours) at 12/13/17 1723  Last data filed at 12/13/17 0745   Gross per 24 hour   Intake              630 ml   Output              400 ml   Net              230 ml       Lines/Drains/Airways     Drain                 NG/OG Tube 12/09/17 1800 nasogastric;nonweighted 16 Fr. Left nostril 3 days          Pressure Ulcer                 Pressure Ulcer 11/12/17 2021 posterior sacral spine Stage  III 30 days          Peripheral Intravenous Line                 Peripheral IV - Single Lumen 12/08/17 1225 Right Hand 5 days                Physical Exam    Significant Labs:  CBC:   Recent Labs  Lab 12/12/17  0533 12/13/17  0557   WBC 5.68 5.80   HGB 15.8 15.9   HCT 46.3 45.8   PLT 42* 45*     BMP:   Recent Labs  Lab 12/13/17  0557   *      K 3.3*   *   CO2 18*   BUN 52*   CREATININE 2.0*   CALCIUM 11.7*     Coagulation:   Recent Labs  Lab 12/13/17  0557   INR 2.1*     Liver Function Test:   Recent Labs  Lab 12/12/17  0533 12/12/17 2034 12/13/17  0557   * 140* 132*   * 139* 123*   ALKPHOS 331* 345* 350*   BILITOT 5.9* 6.2* 5.8*   PROT 5.6* 5.7* 5.6*   ALBUMIN 2.1* 2.1* 2.0*       Significant Imaging:  Imaging results within the past 24 hours have been reviewed.

## 2017-12-13 NOTE — PLAN OF CARE
Consult received for inpatient hospice. Met with patient's family member that spoke with Angeli Winn ( ex-wife) she stated that she knows the patient is ready for hospice but she is not the one with the final say so... She stated that the patient's daughter Sariah has POA and she will not accept hospice. I asked if possibly I could arrange a family meeting with Sariah or could call her this morning and she replied that Sariah is at work and can not take calls. She stated that she is usually at the hospital after 6pm. Update to Augie Birch NP.( attending)

## 2017-12-13 NOTE — SUBJECTIVE & OBJECTIVE
Interval History: Progressively worsening liver function, MELD is now 28. Discussed poor prognosis with ex-wife, she states that the daughter Sariah is the POA. GI/Infectious disease consulted.       Review of Systems   Constitutional: Positive for activity change, appetite change and fatigue. Negative for chills, fever and unexpected weight change.   HENT: Negative.  Negative for congestion, facial swelling, rhinorrhea, sinus pressure, sneezing and sore throat.    Eyes: Negative.  Negative for discharge, redness and visual disturbance.   Respiratory: Negative for apnea, cough, chest tightness, shortness of breath, wheezing and stridor.    Cardiovascular: Negative.  Negative for chest pain, palpitations and leg swelling.   Gastrointestinal: Positive for abdominal distention. Negative for abdominal pain, anal bleeding, blood in stool, constipation, diarrhea, nausea and vomiting.   Endocrine: Negative.    Genitourinary: Positive for decreased urine volume. Negative for difficulty urinating, discharge, dysuria, frequency and hematuria.   Musculoskeletal: Positive for gait problem. Negative for arthralgias, back pain, joint swelling, myalgias, neck pain and neck stiffness.   Skin: Positive for color change. Negative for pallor.   Neurological: Positive for speech difficulty and weakness. Negative for dizziness, tremors, seizures, syncope, facial asymmetry, light-headedness, numbness and headaches.   Psychiatric/Behavioral: Positive for decreased concentration. Negative for behavioral problems, confusion, hallucinations and suicidal ideas. The patient is not nervous/anxious.    All other systems reviewed and are negative.    Objective:     Vital Signs (Most Recent):  Temp: 96.2 °F (35.7 °C) (12/13/17 1618)  Pulse: (!) 118 (12/13/17 1618)  Resp: 20 (12/13/17 1618)  BP: 117/74 (12/13/17 1618)  SpO2: (!) 93 % (12/13/17 1618) Vital Signs (24h Range):  Temp:  [96.2 °F (35.7 °C)-98 °F (36.7 °C)] 96.2 °F (35.7 °C)  Pulse:   [107-124] 118  Resp:  [18-22] 20  SpO2:  [91 %-94 %] 93 %  BP: (117-147)/(74-94) 117/74     Weight: 89.6 kg (197 lb 8.5 oz)  Body mass index is 26.06 kg/m².    Intake/Output Summary (Last 24 hours) at 12/13/17 1653  Last data filed at 12/13/17 0745   Gross per 24 hour   Intake              630 ml   Output              400 ml   Net              230 ml      Physical Exam   Constitutional: He is oriented to person, place, and time. He appears cachectic. He is easily aroused. He does not have a sickly appearance. He appears ill. No distress.   Elderly man, lying in bed, eyes half closed but wakes up easily   HENT:   Head: Normocephalic and atraumatic.   Tongue dry and coated, mouth open. NGT secure with TF infusing    Eyes: Conjunctivae are normal. Pupils are equal, round, and reactive to light. Scleral icterus is present.   Neck: Normal range of motion. Neck supple. No tracheal deviation present. No thyromegaly present.   Cardiovascular: Normal rate, regular rhythm and normal heart sounds.    No murmur heard.  Pulmonary/Chest: Effort normal and breath sounds normal. He has no wheezes. He has no rales. He exhibits no tenderness.   Abdominal: Soft. Bowel sounds are normal. He exhibits distension. He exhibits no mass. There is no tenderness. There is no guarding.   Musculoskeletal: Normal range of motion. He exhibits edema.   Lymphadenopathy:     He has no cervical adenopathy.   Neurological: He is alert, oriented to person, place, and time and easily aroused. No cranial nerve deficit.   Skin: Skin is warm and dry. Capillary refill takes 2 to 3 seconds. He is not diaphoretic.   Nursing note and vitals reviewed.      Significant Labs: All pertinent labs within the past 24 hours have been reviewed.    Significant Imaging:   Imaging Results          X-Ray Chest 1 View (Final result)  Result time 12/09/17 20:15:37    Final result by Jeffrey Serna MD (12/09/17 20:15:37)                 Impression:     See  above.      Electronically signed by: PATTI BELLAMY MD  Date:     12/09/17  Time:    20:15              Narrative:    Exam: Portable chest xray    History:    Encounter for nasogastric tube placement.    Findings:     The nasogastric tube tip projects in the left upper abdomen over the gastric bubble.                             US Guided Paracentesis (Final result)  Result time 12/08/17 16:51:40    Final result by Wilberto Lin III, MD (12/08/17 16:51:40)                 Impression:        1. successful diagnostic paracentesis with removal of 20 cc of ascites.      Electronically signed by: WILBERTO LIN MD  Date:     12/08/17  Time:    16:51              Narrative:    Ultrasound guided paracentesis.    Type indication: HIV. Abdominal pain.    Consent was obtained prior to the procedure.    The procedure was performed in conjunction with case [place]      Ultrasound showed a moderate volume of ascites. It was elected to utilize the right lower quadrant. The patient had a markedly low platelet count therefore only diagnostic paracentesis was performed with a 25-gauge needle.    The skin was prepped and draped in sterile fashion. 1% lidocaine without epinephrine was utilized for local anesthesia. A 25-gauge needle was advanced without difficulty into the peritoneal cavity. A total of 20 cc of light anastasiya colored fluid was removed for diagnostic purposes. The patient tolerated the procedure well without any immediate complications.                             CT Renal Stone Study ABD Pelvis WO (Edited Result - FINAL)  Result time 12/08/17 13:50:41    Addendum 1 of 1 by Wilberto Lin III, MD (12/08/17 13:50:41)    .     All CT scans at this facility use dose modulation, iterative reconstruction, and/or weight based dosing when appropriate to reduce radiation dose to as low as reasonably achievable.      Electronically signed by: WILBERTO LIN MD  Date:     12/08/17  Time:    13:50                 Final result by Wilberto Lin III, MD (12/08/17 13:49:44)                 Impression:        1. Moderate volume of ascites.    2. Minimal pleural effusions with basilar infiltrates and/or atelectatic changes. Small noncalcified pulmonary nodule right base measures 1.3 cm in diameter.    3. Otherwise as above..       Electronically signed by: WILBERTO LIN MD  Date:     12/08/17  Time:    13:49              Narrative:    CT of the abdomen and pelvis without contrast.    Clinical indication: Abdominal pain.    No prior studies for comparison.    There are minimal pleural effusions, greater on the right with mild left perihilar/infrahilar and right basilar atelectatic change. Noncalcified nodule suggested posteriorly at the right base measuring approximately 1.3 cm in diameter.    Within the abdomen or pelvis there is no free intraperitoneal air or bowel obstruction. Bony windows show no acute abnormality. Degenerative changes noted, greatest in the lower lumbar spine.    Heart size is normal. There is a small pericardial effusion.    Within the upper abdomen the liver and spleen are not enlarged. There is no adrenal or pancreatic mass or enlargement. There is no solid renal mass or obstruction. There is atheromatous change along the aorta without gross evidence of aneurysm formation.    There is a moderate volume of ascites with fluid displacing the liver and spleen somewhat medially. Cannot exclude a small hiatal hernia. Exam limited by the lack of oral contrast.    Within the lower abdomen and pelvis, there is again evidence of a moderate volume of fluid in the pelvis. Bladder is unremarkable. Contrast with stool noted in the rectosigmoid. No gross abnormality in the right lower quadrant but suggest acute appendicitis.                             CT Head Without Contrast (Final result)  Result time 12/08/17 13:13:21    Final result by Wilberto Lin III, MD (12/08/17 13:13:21)                  Impression:     Stable CT brain. Atrophy with bilateral white matter changes most likely related to microvascular disease. No bleed or other gross acute abnormality suggested.      All CT scans at this facility use dose modulation, iterative reconstruction, and/or weight based dosing when appropriate to reduce radiation dose to as low as reasonably achievable.      Electronically signed by: WILBERTO PACK MD  Date:     12/08/17  Time:    13:13              Narrative:    CT of the head without contrast.    Clinical indication: ams.Altered mental status    Standard noncontrast CT scan of the brain. Compared to November.      The brain and ventricles again show generalized changes of atrophy, both supra-and infratentorial. There are low-attenuation changes in the periventricular and subcortical white matter. No hemorrhage, mass lesion, hydrocephalus, or midline shift. No acute/depressed skull fracture. Scan is degraded by motion artifact.                             X-Ray Chest 1 View (Final result)  Result time 12/08/17 12:26:13    Final result by Wilberto Pack III, MD (12/08/17 12:26:13)                 Impression:         Negative one view chest x-ray.      Electronically signed by: WILBERTO PACK MD  Date:     12/08/17  Time:    12:26              Narrative:    One view chest x-ray.    Clinical indication: Weakness.    Heart size is normal. The lung fields are clear. No acute pulmonary infiltrate.

## 2017-12-14 NOTE — CONSULTS
[facility] -   Adult Nutrition  Consult Note    SUMMARY     Recommendations    Recommendation/Intervention: 1. Consider increasing tubefeed rate to 65ml/hr and add Beneprotein 2 packets daily for 2390 calories, 117g protein, 1252ml water. Continue flushes. Hold for residuals greater than 300 or signs of intolerance (distention, vomiting, abdominal pain)  Goals: Meet at least 85% of estimated needs from nutrition support  Nutrition Goal Status: new  Communication of RD Recs:  (care plan and sticky note)      Reason for Assessment    Reason for Assessment: nurse/nurse practitioner consult, identified at risk by screening criteria, new tube feeding (Stage III coccyx')  Diagnosis:  (Acute Encephalopathy)          Past Medical History:   Diagnosis Date    AIDS     Chronic back pain     Chronic hepatitis C     DDD (degenerative disc disease), lumbar     Diabetes mellitus     Hepatitis B antibody positive     History of anal cancer     HIV (human immunodeficiency virus infection)     Hypertension     Neuropathy     Obesity     Syphilis     Vitreous detachment of right eye                Nutrition Discharge Planning: too soon to determine    Nutrition Prescription Ordered    Current Diet Order: NPO     Current Nutrition Support Formula Ordered: Nutren 1.5  Current Nutrition Support Rate Ordered: 40 (ml)  Current Nutrition Support Frequency Ordered: ml/hr        Evaluation of Received Nutrients/Fluid Intake    Enteral Calories (kcal): 1440  Enteral Protein (gm): 65  Enteral (Free Water) Fluid (mL): 733                                   % Kcal Needs: 53                 % Protein Needs: 55                                    % Intake of Estimated Energy Needs: 50 - 75 %  % Meal Intake: NPO     Nutrition Risk Screen     Nutrition Risk Screen: no indicators present    Nutrition/Diet History       Typical Food/Fluid Intake: unable to obtain  Food Preferences: unable to obtain                      "    Labs/Tests/Procedures/Meds       Pertinent Labs Reviewed: reviewed      BMP  Lab Results   Component Value Date     12/14/2017    K 3.1 (L) 12/14/2017     (H) 12/14/2017    CO2 14 (L) 12/14/2017    BUN 60 (H) 12/14/2017    CREATININE 2.3 (H) 12/14/2017    CALCIUM 12.0 (H) 12/14/2017    ANIONGAP 10 12/14/2017    ESTGFRAFRICA 33 (A) 12/14/2017    EGFRNONAA 28 (A) 12/14/2017     Lab Results   Component Value Date    CALCIUM 12.0 (H) 12/14/2017    PHOS 1.9 (L) 11/22/2017       Recent Labs  Lab 12/14/17  0704   POCTGLUCOSE 147*     Lab Results   Component Value Date    HGBA1C 4.9 11/12/2017       Pertinent Medications Reviewed: reviewed       Physical Findings          Oral/Mouth Cavity: decay/carries  Skin: pressure ulcer(s) (sacral spine stage III)    Anthropometrics    Temp: 97.4 °F (36.3 °C)     Height: 6' 1" (185.4 cm)  Weight Method: Bed Scale  Weight: 89.1 kg (196 lb 6.9 oz)  Ideal Body Weight (IBW), Male: 184 lb     % Ideal Body Weight, Male (lb): 108.43 lb     BMI (Calculated): 26.4  BMI Grade: 25 - 29.9 - overweight                            Estimated/Assessed Needs    Weight Used For Calorie Calculations: 90.5 kg (199 lb 8.3 oz)      Energy Calorie Requirements (kcal): 2253  Energy Need Method: Emmett-St Jeor (x1.3)     30 kcal/kg (kcal): 2715   RMR (Emmett-St. Jeor Equation): 1733.88        Weight Used For Protein Calculations: 90.5 kg (199 lb 8.3 oz)     1.3 gm Protein (gm): 117.9 and 1.5 gm Protein (gm): 136.03              RDA Method (mL): 2253               Assessment and Plan    * Hepatic encephalopathy    Nutrition Diagnosis:  Inadequate energy intake    Related to (etiology):   encephalopathy    Signs and Symptoms (as evidenced by):   inability to safely consume adequate energy with current nutrition support meeting about 50% of estimated needs     Interventions/Recommendations (treatment strategy):  1. Increase rate to 65ml/hr and add beneprotein 2 packets daily    Nutrition " Diagnosis Status:   New              Monitor and Evaluation    Food and Nutrient Intake: energy intake  Food and Nutrient Adminstration: diet order, enteral and parenteral nutrition administration     Physical Activity and Function: nutrition-related ADLs and IADLs  Anthropometric Measurements: weight  Biochemical Data, Medical Tests and Procedures: electrolyte and renal panel, glucose/endocrine profile  Nutrition-Focused Physical Findings: skin      Nutrition Follow-Up    RD Follow-up?: Yes (2x weekly)

## 2017-12-14 NOTE — PROGRESS NOTES
Concerns expressed to Augie JOSE about pt condition deteriorating, pt more lethargic, RR 25 oxygen 90% on 3L, pupils sluggish, pt withdrawals to pain.  NP stated to monitor for now and update him with any changes.  Will continue to monitor.

## 2017-12-14 NOTE — PLAN OF CARE
Problem: Patient Care Overview  Goal: Plan of Care Review  PT IS NONVERBAL AND REQUIRES TOTAL CARE AT THIS TIME. PT IS NOT APPROPRIATE FOR P.T. AND WILL BE D/C TO MOBILITY PROGRAM FOR PROM   Outcome: Ongoing (interventions implemented as appropriate)  Recommendation/Intervention: 1. Consider increasing tubefeed rate to 65ml/hr and add Beneprotein 2 packets daily for 2390 calories, 117g protein, 1252ml water. Continue flushes. Hold for residuals greater than 300 or signs of intolerance (distention, vomiting, abdominal pain)  Goals: Meet at least 85% of estimated needs from nutrition support  Nutrition Goal Status: new  Communication of RD Recs:  (care plan and sticky note

## 2017-12-14 NOTE — ASSESSMENT & PLAN NOTE
With all other co-morbidities ,prognosis is poor .  Will stopTenofovir due to renal failure .  Will adjust dose of emtricitabine to 200mg every 48 hours.    Will consider Palliative care .  GI input appreciated.

## 2017-12-14 NOTE — PROGRESS NOTES
"0930-Ttube feeding residual checked this AM was >800mL, pH4.  500mL of residual returned to pt, meds given at this time.  Tube feedings held and will monitor residual.      1100-Tube feeding residual checked at this time and was roughly 240mL.  Will continue to hold tube feeding.    1300-Residual checked at this time and was roughly 180ml.  DAMON Ghosh made aware of the above and stated to "continue to hold tube feeding and monitor until residual is <40.  If residual is <40 restart tube feeding at 10mL/hr".      1800- Residual checked and was roughly 20-30mL.  Tube feeding restarted at this time at 10mL/hr.  Will continue to monitor.  "

## 2017-12-14 NOTE — HPI
66 year old male  with  AIDS(poorly compliant with therapy ),history  HCV cirrhosis, HTN, HLD, DM II, chronic back pain, PUD, and h/o anal cancer who presented to ED from [facility] with AMS and generalized weakness. The pt's family reports over the last 2 days, the pt had increasing confusion, agitation, and hallucinations.     He was also confused during his recent hospital admission at this facility and at that time,he had extensive work up for HIV related etiology -FELICITY virus and CSF VDRLwas negative .     At this time, he has NG tube insitu and is confused.    Since admission, lab data showed ammonia -100,serum platelet -45.  Head CT scan showed stable CT brain. Atrophy with bilateral white matter changes most likely related to microvascular disease.    Abdominal paracentesis was done and it showed -wbc -304 with 11% seg. CMP showed bili-6.2-,creatinine of 2.0  He was seen and examined at bedside.

## 2017-12-14 NOTE — PROGRESS NOTES
Pt free of falls. PIV intact. Meds infusing. Patient non verbal, tracks with eyes and does not follow commands for motor response.  Pt shows no verbal signs of pain.  Patient tachy on the monitor for entire shift and NP made aware.  Residuals on tube feeding were above 800 mls this morning, so infusion of 30ml/hr was stopped. Residuals checked per provider order q4hrs, residuals at 1200 were 180mls.  Tube feeding continued to hold.   Started tube feeding again at 1700 at 10ml/hr per provider order when residual was less than 40mls.  Call light in reach and hourly rounding made.

## 2017-12-14 NOTE — ASSESSMENT & PLAN NOTE
IVF , with anemia and renal failure, will consider workup for multiple myeloma -will discuss with primary team

## 2017-12-14 NOTE — SUBJECTIVE & OBJECTIVE
Past Medical History:   Diagnosis Date    AIDS     Chronic back pain     Chronic hepatitis C     DDD (degenerative disc disease), lumbar     Diabetes mellitus     Hepatitis B antibody positive     History of anal cancer     HIV (human immunodeficiency virus infection)     Hypertension     Neuropathy     Obesity     Syphilis     Vitreous detachment of right eye        Past Surgical History:   Procedure Laterality Date    anal biopsy      COLONOSCOPY W/ BIOPSIES      ESOPHAGOGASTRODUODENOSCOPY      EYE SURGERY      MEDIPORT INSERTION, SINGLE      MEDIPORT REMOVAL         Review of patient's allergies indicates:  No Known Allergies    Medications:  Prescriptions Prior to Admission   Medication Sig    albuterol-ipratropium 2.5mg-0.5mg/3mL (DUO-NEB) 0.5 mg-3 mg(2.5 mg base)/3 mL nebulizer solution Take 3 mLs by nebulization every 6 (six) hours while awake. Rescue    amLODIPine (NORVASC) 5 MG tablet Take 5 mg by mouth once daily.     aspirin (ECOTRIN) 81 MG EC tablet Take 81 mg by mouth once daily.     azithromycin 200 mg/5 ml (ZITHROMAX) 200 mg/5 mL suspension Take 30 mLs (1,200 mg total) by mouth once a week. (Patient taking differently: Take 1,200 mg by mouth every Wednesday. )    cyclobenzaprine (FLEXERIL) 10 MG tablet Take 10 mg by mouth once daily.     dolutegravir 50 mg Tab Take 1 tablet (50 mg total) by mouth once daily.    emtricitabine-tenofovir 200-300 mg (TRUVADA) 200-300 mg Tab Take 1 tablet by mouth once daily.    gabapentin (NEURONTIN) 800 MG tablet Take 800 mg by mouth once daily.     insulin detemir (LEVEMIR FLEXTOUCH) 100 unit/mL (3 mL) SubQ InPn pen Inject 10 Units into the skin 2 (two) times daily.    insulin NPH-insulin regular, 70/30, (NOVOLIN 70/30) 100 unit/mL (70-30) injection Inject subcutaneously 60 units in the morning and 40 units in the evening.    LUTEIN/ZEAXANTHIN (OCUVITE LUTEIN 25 ORAL) Take by mouth.    nortriptyline (PAMELOR) 10 MG capsule TAKE 2 (10mg)  CAPS BY MOUTH AT BEDTIME    omeprazole (PRILOSEC) 40 MG capsule TAKE 1 CAPSULE BY MOUTH DAILY.    pravastatin (PRAVACHOL) 20 MG tablet TAKE 1 TABLET BY MOUTH DAILY.    predniSONE (DELTASONE) 20 MG tablet Take 40mg oral twice daily for 5 days, 40mg oral daily x 5 days, then 20mg oral daily for 11 days then discontinue    sulfamethoxazole-trimethoprim 400-80mg (BACTRIM,SEPTRA) 400-80 mg per tablet Take 2 tablets by mouth 3 (three) times daily.    enalapril (VASOTEC) 20 MG tablet TAKE 1 TABLET BY MOUTH 2 (TWO) TIMES DAILY.    mv-min-folic acid-lutein 500-250 mcg Chew Take 1 tablet by mouth.     Antibiotics     Start     Stop Route Frequency Ordered    12/09/17 1730  cefTRIAXone (ROCEPHIN) 2 g in dextrose 5 % 50 mL IVPB      -- IV Every 24 hours (non-standard times) 12/08/17 2158        Antifungals     None        Antivirals         Stop Route Frequency     dolutegravir      -- Oral Daily     emtricitabine      -- Oral Daily     tenofovir      -- Oral Daily           Immunization History   Administered Date(s) Administered    PPD Test 11/16/2017       Family History     Problem Relation (Age of Onset)    Diabetes Sister, Brother    No Known Problems Mother, Father        Social History     Social History    Marital status:      Spouse name: N/A    Number of children: N/A    Years of education: N/A     Social History Main Topics    Smoking status: Former Smoker     Quit date: 1999    Smokeless tobacco: Never Used    Alcohol use Yes      Comment: occasional    Drug use: No    Sexual activity: No     Other Topics Concern    None     Social History Narrative    None     Review of Systems   Unable to perform ROS: Acuity of condition     Objective:     Vital Signs (Most Recent):  Temp: 97.9 °F (36.6 °C) (12/14/17 0003)  Pulse: (!) 126 (12/14/17 0121)  Resp: 20 (12/14/17 0121)  BP: 116/78 (12/14/17 0003)  SpO2: 95 % (12/14/17 0121) Vital Signs (24h Range):  Temp:  [96.2 °F (35.7 °C)-98 °F (36.7 °C)]  97.9 °F (36.6 °C)  Pulse:  [111-126] 126  Resp:  [16-20] 20  SpO2:  [91 %-95 %] 95 %  BP: (116-154)/(74-94) 116/78     Weight: 89.6 kg (197 lb 8.5 oz)  Body mass index is 26.06 kg/m².    Estimated Creatinine Clearance: 40.5 mL/min (based on SCr of 2 mg/dL (H)).    Physical Exam   Constitutional: He appears cachectic. He is easily aroused. He does not have a sickly appearance. He appears ill. No distress.   Elderly man, lying in bed, eyes half closed but wakes up easily   HENT:   Head: Normocephalic and atraumatic.   Tongue dry and coated, mouth open. NGT secure with TF infusing    Eyes: Pupils are equal, round, and reactive to light. Scleral icterus is present.   Neck: Normal range of motion. Neck supple. No tracheal deviation present. No thyromegaly present.   Cardiovascular: Normal rate, regular rhythm and normal heart sounds.    No murmur heard.  Pulmonary/Chest: Effort normal and breath sounds normal. He has no wheezes. He has no rales. He exhibits no tenderness.   Abdominal: Soft. Bowel sounds are normal. He exhibits distension. He exhibits no mass. There is no tenderness. There is no guarding.   Musculoskeletal: Normal range of motion. He exhibits edema.   Lymphadenopathy:     He has no cervical adenopathy.   Neurological: He is easily aroused. No cranial nerve deficit.   lethargic   Skin: Skin is warm and dry. Capillary refill takes 2 to 3 seconds. He is not diaphoretic.   Nursing note and vitals reviewed.      Significant Labs:   Blood Culture:   Recent Labs  Lab 11/12/17  1540 11/12/17  1555   LABBLOO No growth after 5 days. No growth after 5 days.     BMP:   Recent Labs  Lab 12/13/17  0557   *      K 3.3*   *   CO2 18*   BUN 52*   CREATININE 2.0*   CALCIUM 11.7*     CBC:   Recent Labs  Lab 12/12/17  0533 12/13/17  0557   WBC 5.68 5.80   HGB 15.8 15.9   HCT 46.3 45.8   PLT 42* 45*     All pertinent labs within the past 24 hours have been reviewed.    Significant Imaging: I have reviewed  all pertinent imaging results/findings within the past 24 hours.

## 2017-12-14 NOTE — CONSULTS
[facility]  Infectious Disease  Consult Note    Patient Name: Jeffrey Rosales  MRN: [id number]  Admission Date: 12/8/2017  Hospital Length of Stay: 6 days  Attending Physician: Tommy Myles MD  Primary Care Provider: Provider Not[facility]ystem     Isolation Status: No active isolations    Patient information was obtained from past medical records and ER records.      Consults  Assessment/Plan:     * Hepatic encephalopathy    Lactulose ,  GI and primary team follow up.        Cirrhosis of liver with ascites    Will do liver ultrasound .  GI followup         Hypercalcemia    IVF , with anemia and renal failure, will consider workup for multiple myeloma -will discuss with primary team         AIDS (acquired immunodeficiency syndrome), CD4 <=200    With all other co-morbidities ,prognosis is poor .  Will stopTenofovir due to renal failure .  Will adjust dose of emtricitabine to 200mg every 48 hours.    Will consider Palliative care .  GI input appreciated.        Recent Hx of PCP Pneumonia withy acute Hypoxemic Resp Failure     Will adjust dose of bactrim to prophylasix dose             Thank you for your consult. I will follow-up with patient. Please contact us if you have any additional questions.    Jeffrey López MD  Infectious Disease  [facility]    Subjective:     Principal Problem: Hepatic encephalopathy    HPI: 66 year old male  with  AIDS(poorly compliant with therapy ),history  HCV cirrhosis, HTN, HLD, DM II, chronic back pain, PUD, and h/o anal cancer who presented to ED from [facility] with AMS and generalized weakness. The pt's family reports over the last 2 days, the pt had increasing confusion, agitation, and hallucinations.     He was also confused during his recent hospital admission at this facility and at that time,he had extensive work up for HIV related etiology -FELICITY virus and CSF VDRLwas negative .     At this time, he has NG tube insitu and is  confused.    Since admission, lab data showed ammonia -100,serum platelet -45.  Head CT scan showed stable CT brain. Atrophy with bilateral white matter changes most likely related to microvascular disease.    Abdominal paracentesis was done and it showed -wbc -304 with 11% seg. CMP showed bili-6.2-,creatinine of 2.0  He was seen and examined at bedside.    Past Medical History:   Diagnosis Date    AIDS     Chronic back pain     Chronic hepatitis C     DDD (degenerative disc disease), lumbar     Diabetes mellitus     Hepatitis B antibody positive     History of anal cancer     HIV (human immunodeficiency virus infection)     Hypertension     Neuropathy     Obesity     Syphilis     Vitreous detachment of right eye        Past Surgical History:   Procedure Laterality Date    anal biopsy      COLONOSCOPY W/ BIOPSIES      ESOPHAGOGASTRODUODENOSCOPY      EYE SURGERY      MEDIPORT INSERTION, SINGLE      MEDIPORT REMOVAL         Review of patient's allergies indicates:  No Known Allergies    Medications:  Prescriptions Prior to Admission   Medication Sig    albuterol-ipratropium 2.5mg-0.5mg/3mL (DUO-NEB) 0.5 mg-3 mg(2.5 mg base)/3 mL nebulizer solution Take 3 mLs by nebulization every 6 (six) hours while awake. Rescue    amLODIPine (NORVASC) 5 MG tablet Take 5 mg by mouth once daily.     aspirin (ECOTRIN) 81 MG EC tablet Take 81 mg by mouth once daily.     azithromycin 200 mg/5 ml (ZITHROMAX) 200 mg/5 mL suspension Take 30 mLs (1,200 mg total) by mouth once a week. (Patient taking differently: Take 1,200 mg by mouth every Wednesday. )    cyclobenzaprine (FLEXERIL) 10 MG tablet Take 10 mg by mouth once daily.     dolutegravir 50 mg Tab Take 1 tablet (50 mg total) by mouth once daily.    emtricitabine-tenofovir 200-300 mg (TRUVADA) 200-300 mg Tab Take 1 tablet by mouth once daily.    gabapentin (NEURONTIN) 800 MG tablet Take 800 mg by mouth once daily.     insulin detemir (LEVEMIR FLEXTOUCH) 100  unit/mL (3 mL) SubQ InPn pen Inject 10 Units into the skin 2 (two) times daily.    insulin NPH-insulin regular, 70/30, (NOVOLIN 70/30) 100 unit/mL (70-30) injection Inject subcutaneously 60 units in the morning and 40 units in the evening.    LUTEIN/ZEAXANTHIN (OCUVITE LUTEIN 25 ORAL) Take by mouth.    nortriptyline (PAMELOR) 10 MG capsule TAKE 2 (10mg) CAPS BY MOUTH AT BEDTIME    omeprazole (PRILOSEC) 40 MG capsule TAKE 1 CAPSULE BY MOUTH DAILY.    pravastatin (PRAVACHOL) 20 MG tablet TAKE 1 TABLET BY MOUTH DAILY.    predniSONE (DELTASONE) 20 MG tablet Take 40mg oral twice daily for 5 days, 40mg oral daily x 5 days, then 20mg oral daily for 11 days then discontinue    sulfamethoxazole-trimethoprim 400-80mg (BACTRIM,SEPTRA) 400-80 mg per tablet Take 2 tablets by mouth 3 (three) times daily.    enalapril (VASOTEC) 20 MG tablet TAKE 1 TABLET BY MOUTH 2 (TWO) TIMES DAILY.    mv-min-folic acid-lutein 500-250 mcg Chew Take 1 tablet by mouth.     Antibiotics     Start     Stop Route Frequency Ordered    12/09/17 1730  cefTRIAXone (ROCEPHIN) 2 g in dextrose 5 % 50 mL IVPB      -- IV Every 24 hours (non-standard times) 12/08/17 2158        Antifungals     None        Antivirals         Stop Route Frequency     dolutegravir      -- Oral Daily     emtricitabine      -- Oral Daily     tenofovir      -- Oral Daily           Immunization History   Administered Date(s) Administered    PPD Test 11/16/2017       Family History     Problem Relation (Age of Onset)    Diabetes Sister, Brother    No Known Problems Mother, Father        Social History     Social History    Marital status:      Spouse name: N/A    Number of children: N/A    Years of education: N/A     Social History Main Topics    Smoking status: Former Smoker     Quit date: 1999    Smokeless tobacco: Never Used    Alcohol use Yes      Comment: occasional    Drug use: No    Sexual activity: No     Other Topics Concern    None     Social History  Narrative    None     Review of Systems   Unable to perform ROS: Acuity of condition     Objective:     Vital Signs (Most Recent):  Temp: 97.9 °F (36.6 °C) (12/14/17 0003)  Pulse: (!) 126 (12/14/17 0121)  Resp: 20 (12/14/17 0121)  BP: 116/78 (12/14/17 0003)  SpO2: 95 % (12/14/17 0121) Vital Signs (24h Range):  Temp:  [96.2 °F (35.7 °C)-98 °F (36.7 °C)] 97.9 °F (36.6 °C)  Pulse:  [111-126] 126  Resp:  [16-20] 20  SpO2:  [91 %-95 %] 95 %  BP: (116-154)/(74-94) 116/78     Weight: 89.6 kg (197 lb 8.5 oz)  Body mass index is 26.06 kg/m².    Estimated Creatinine Clearance: 40.5 mL/min (based on SCr of 2 mg/dL (H)).    Physical Exam   Constitutional: He appears cachectic. He is easily aroused. He does not have a sickly appearance. He appears ill. No distress.   Elderly man, lying in bed, eyes half closed but wakes up easily   HENT:   Head: Normocephalic and atraumatic.   Tongue dry and coated, mouth open. NGT secure with TF infusing    Eyes: Pupils are equal, round, and reactive to light. Scleral icterus is present.   Neck: Normal range of motion. Neck supple. No tracheal deviation present. No thyromegaly present.   Cardiovascular: Normal rate, regular rhythm and normal heart sounds.    No murmur heard.  Pulmonary/Chest: Effort normal and breath sounds normal. He has no wheezes. He has no rales. He exhibits no tenderness.   Abdominal: Soft. Bowel sounds are normal. He exhibits distension. He exhibits no mass. There is no tenderness. There is no guarding.   Musculoskeletal: Normal range of motion. He exhibits edema.   Lymphadenopathy:     He has no cervical adenopathy.   Neurological: He is easily aroused. No cranial nerve deficit.   lethargic   Skin: Skin is warm and dry. Capillary refill takes 2 to 3 seconds. He is not diaphoretic.   Nursing note and vitals reviewed.      Significant Labs:   Blood Culture:   Recent Labs  Lab 11/12/17  1540 11/12/17  1555   LABBLOO No growth after 5 days. No growth after 5 days.     BMP:    Recent Labs  Lab 12/13/17  0557   *      K 3.3*   *   CO2 18*   BUN 52*   CREATININE 2.0*   CALCIUM 11.7*     CBC:   Recent Labs  Lab 12/12/17  0533 12/13/17 0557   WBC 5.68 5.80   HGB 15.8 15.9   HCT 46.3 45.8   PLT 42* 45*     All pertinent labs within the past 24 hours have been reviewed.    Significant Imaging: I have reviewed all pertinent imaging results/findings within the past 24 hours.

## 2017-12-14 NOTE — PLAN OF CARE
Problem: Patient Care Overview  Goal: Plan of Care Review  PT IS NONVERBAL AND REQUIRES TOTAL CARE AT THIS TIME. PT IS NOT APPROPRIATE FOR P.T. AND WILL BE D/C TO MOBILITY PROGRAM FOR PROM   Outcome: Ongoing (interventions implemented as appropriate)  Plan of care reviewed with patient. Patient verbalize understanding. Sinus tach on tele monitor no facial expression from pain at this time. Ngtube patent with nutrient 10ml/hr. Safety measures maintained will cont to monitor.

## 2017-12-15 LAB
HBV CORE AB SERPL QL IA: POSITIVE
HBV SURFACE AB SER-ACNC: ABNORMAL M[IU]/ML
HBV SURFACE AG SERPL QL IA: NEGATIVE

## 2017-12-15 NOTE — PROGRESS NOTES
Augie JOSE spoke with Alysia Rolle via phone about pt declining status and loss of IV access; NP had discussion with daughter at this time about DNR status and alysia Rolle stated that she wanted the pt be a DNR.  Augie JOSE completed DNR paper work and placed in pt chart.  Will continue to monitor.

## 2017-12-15 NOTE — LOPA/MORA/SWTA/AOC/AEB
OPAL was notified of patients passing. I spoke with Kendra Lima who provided me with reference #[id number]

## 2017-12-15 NOTE — PROGRESS NOTES
1645-This RN notified of pt BP 86/50 by PCT.   1700- This RN attempted to take BP manually multiple times and Allyson charge nurse attempted as well, only BP able to obtain 60/30.  Not able to get an accurate pulse ox at this time despite multiple attempts.  Pt exwife at bedside and updated on pt rapid decline of health, at this time ex wife called family again and notified them to get to hospital as soon as possible.    1710-MD and NP called to bedside and notified of inability to get vital signs, RR30+ and -130s.  MD discussed pt current status with ex wife at bedside.

## 2017-12-15 NOTE — NURSING
At 1845 on 17, pt passed away. House supervisor, OPAL Winn, and  home were all notified. Appropriate paper work was filled out. Family was with patient at time of passing.  home called to say that they would not be able to pick the body up this evening, therefore, security was called, arrived onto the floor at 2300 and took body to [facility].

## 2017-12-15 NOTE — DISCHARGE SUMMARY
"[facility] Hospital Medicine  Discharge Summary      Patient Name: Jeffrey Rosales  MRN: [id number]  Admission Date: 12/8/2017  Hospital Length of Stay: 6 days  Discharge Date and Time:  12/14/2017 7:11 PM  Attending Physician: Tommy Myles MD   Discharging Provider: Augie Birch NP  Primary Care Provider: Provider Notinsystem      HPI:   Mr. Rosales is a 67 yo male  with  HIV/AIDS, HCV cirrhosis, HTN, HLD, DM II, chronic back pain, PUD, and h/o anal cancer who presented to ED from [facility] with AMS and generalized weakness. The pt's family reports over the last 2 days, the pt had increasing confusion, agitation, and hallucinations. He also was cursing at staff and threatening to "punch" his son which is very out of character. Also, the family reports pt was refusing medications or eating.   Discharged 11/23/17 to SNF from hospital for PCP pneumonia. Had lumbar puncture during hospitalization for hallucinations and FELICITY virus and neuro syphilis ruled out       * No surgery found *      Hospital Course:   Mr. Rosales is a 67 yo male  with HIV/AIDS, HCV cirrhosis, HTN, HLD, DM II, chronic back pain, PUD, and h/o anal cancer who presented to ED from [facility] with AMS and generalized weakness x 2 days. Subsequently admitted and found to have hepatic encephalopathy with Ammonia 89-- treated with Lactulose. Last night also got a dose of Ativan 2 mg IV for agitation and since then the pt has been sleeping and has not taken any of his oral meds today including Lactulose. Family concerned. Pt is arousable with deep stimuli but falls back asleep. Repeat Ammonia was 28. LFts also decreased. GI consulted and his MELD score is 17. He appears to have poor prognosis due to his ES AIDs and Ch Hep C Cirrhosis with ascites but no SBP. Ordered NGT placement to give him Lactulose.    12/10-- remains groggy but more alert and responsive today, getting meds thru NG. No fever or " "chills, no flapping tremor. Ammonia was 28 yesterday and today is 37, getting Lactulose via NG and had a large BM this am. Now starting water thru the NGT.     -- appears awake but very weak, voice weak but speech clear, he appears exhausted and weak. NG in place. ALT/AST improving Bili rising. Daughter informs that he was not eating much for 3 days before coming and has not had much to eat here either. I expalined to the daughter ( POA) and pt's wife that his condition is worsening due to advanced liver failure as well as advanced ES AIDS but the daughter does not believe it, however mother/wife accepts and understands. TD starting now. Explained risk of aspiration to them as well.     - The patient is tolerating tube feeding. The patients total bili continues to worsen. The patient is more lethargic today. No family at bedside today to discuss plan of care.      Progressively worsening liver function, MELD is now 28. Discussed poor prognosis with ex-wife, she states that the daughter Sariah is the POA and she wants "everything done". GI/Infectious disease consulted.    Long discussion with the patients daughter Sariah who agreed to making the patient a DNR. The patient continued to declined over the next few hours. The patient  at 6:58 pm with family at bedside.      Consults:   Consults         Status Ordering Provider     Inpatient consult to Gastroenterology  Once     Provider:  Jeremy Fletcher MD    Completed SANTIAGO TLILEY     Inpatient consult to Gastroenterology  Once     Provider:  Jeremy Fletcher MD    Completed BRYANNA ATKINSON     Inpatient consult to Infectious Diseases  Once     Provider:  Jeffrey López MD    Acknowledged BRYANNA ATKINSON     Inpatient consult to Social Work  Once     Provider:  (Not yet assigned)    Acknowledged BRYANNA ATKINSON     IP consult to dietary  Once     Provider:  (Not yet assigned)    Completed GABRIELE TANNER          No new Assessment & " Plan notes have been filed under this hospital service since the last note was generated.  Service: Hospital Medicine    Final Active Diagnoses:    Diagnosis Date Noted POA    PRINCIPAL PROBLEM:  Hepatic encephalopathy [K72.90] 2017 Yes    Abnormal liver enzymes [R74.8] 2017 Yes    Cirrhosis of liver with ascites [K74.60] 2017 Yes    Hypercalcemia [E83.52] 2017 Yes    Pressure ulcer of coccygeal region, stage 3 [L89.153] 2017 Yes    Recent Hx of PCP Pneumonia withy acute Hypoxemic Resp Failure  [J18.9] 2017 Yes    Decompensated cirrhosis related to hepatitis C virus (HCV) [B19.20, K74.69] 2015 Yes    AIDS (acquired immunodeficiency syndrome), CD4 <=200 [B20] 1999 Yes      Problems Resolved During this Admission:    Diagnosis Date Noted Date Resolved POA    SBP (spontaneous bacterial peritonitis) [K65.2] 2017 Yes    Hyperkalemia [E87.5] 2017 Yes    Hyponatremia [E87.1] 2017 Yes       Discharged Condition: stable    Disposition:     Follow Up:    Patient Instructions:   No discharge procedures on file.    Significant Diagnostic Studies:   Imaging Results          US Abdomen Limited (Final result)  Result time 17 09:51:55    Final result by Keyanna Arroyo MD (17 09:51:55)                 Impression:     Abnormal liver compatible with cirrhosis.  Ascites.    Cholelithiasis with gallbladder lumen sludge.      Electronically signed by: KEYANNA ARROYO MD  Date:     17  Time:    09:51              Narrative:    Procedure: US ABDOMEN LIMITED, 17 09:30:08    Clinical history: Ascites.  Evaluate for cirrhosis.  Abdominal pain.    The liver is small with a nodular contour.  Heterogeneous echotexture is noted consistent with cirrhosis. The portal vein is not seen.The gallbladder is abnormal with several stones.  Probable gallbladder lumen sludge.  Mild wall thickening. The common  bile duct is normal at 2.3mm. The pancreas is normal.    Mild to moderate ascites.     The right kidney is normal at 11.3cm.                             X-Ray Chest 1 View (Final result)  Result time 12/09/17 20:15:37    Final result by Patti Bellamy MD (12/09/17 20:15:37)                 Impression:     See above.      Electronically signed by: PATTI BELLAMY MD  Date:     12/09/17  Time:    20:15              Narrative:    Exam: Portable chest xray    History:    Encounter for nasogastric tube placement.    Findings:     The nasogastric tube tip projects in the left upper abdomen over the gastric bubble.                             US Guided Paracentesis (Final result)  Result time 12/08/17 16:51:40    Final result by Ever Lin III, MD (12/08/17 16:51:40)                 Impression:        1. successful diagnostic paracentesis with removal of 20 cc of ascites.      Electronically signed by: EVER LIN MD  Date:     12/08/17  Time:    16:51              Narrative:    Ultrasound guided paracentesis.    Type indication: HIV. Abdominal pain.    Consent was obtained prior to the procedure.    [facility]      Ultrasound showed a moderate volume of ascites. It was elected to utilize the right lower quadrant. The patient had a markedly low platelet count therefore only diagnostic paracentesis was performed with a 25-gauge needle.    The skin was prepped and draped in sterile fashion. 1% lidocaine without epinephrine was utilized for local anesthesia. A 25-gauge needle was advanced without difficulty into the peritoneal cavity. A total of 20 cc of light anastasiya colored fluid was removed for diagnostic purposes. The patient tolerated the procedure well without any immediate complications.                             CT Renal Stone Study ABD Pelvis WO (Edited Result - FINAL)  Result time 12/08/17 13:50:41    Addendum 1 of 1 by Ever Lin III, MD  (12/08/17 13:50:41)    .     All CT scans at this facility use dose modulation, iterative reconstruction, and/or weight based dosing when appropriate to reduce radiation dose to as low as reasonably achievable.      Electronically signed by: WILBERTO PACK MD  Date:     12/08/17  Time:    13:50                Final result by Wilberto Pack III, MD (12/08/17 13:49:44)                 Impression:        1. Moderate volume of ascites.    2. Minimal pleural effusions with basilar infiltrates and/or atelectatic changes. Small noncalcified pulmonary nodule right base measures 1.3 cm in diameter.    3. Otherwise as above..       Electronically signed by: WILBERTO PACK MD  Date:     12/08/17  Time:    13:49              Narrative:    CT of the abdomen and pelvis without contrast.    Clinical indication: Abdominal pain.    No prior studies for comparison.    There are minimal pleural effusions, greater on the right with mild left perihilar/infrahilar and right basilar atelectatic change. Noncalcified nodule suggested posteriorly at the right base measuring approximately 1.3 cm in diameter.    Within the abdomen or pelvis there is no free intraperitoneal air or bowel obstruction. Bony windows show no acute abnormality. Degenerative changes noted, greatest in the lower lumbar spine.    Heart size is normal. There is a small pericardial effusion.    Within the upper abdomen the liver and spleen are not enlarged. There is no adrenal or pancreatic mass or enlargement. There is no solid renal mass or obstruction. There is atheromatous change along the aorta without gross evidence of aneurysm formation.    There is a moderate volume of ascites with fluid displacing the liver and spleen somewhat medially. Cannot exclude a small hiatal hernia. Exam limited by the lack of oral contrast.    Within the lower abdomen and pelvis, there is again evidence of a moderate volume of fluid in the pelvis. Bladder is unremarkable.  Contrast with stool noted in the rectosigmoid. No gross abnormality in the right lower quadrant but suggest acute appendicitis.                             CT Head Without Contrast (Final result)  Result time 12/08/17 13:13:21    Final result by Wilberto Lin III, MD (12/08/17 13:13:21)                 Impression:     Stable CT brain. Atrophy with bilateral white matter changes most likely related to microvascular disease. No bleed or other gross acute abnormality suggested.      All CT scans at this facility use dose modulation, iterative reconstruction, and/or weight based dosing when appropriate to reduce radiation dose to as low as reasonably achievable.      Electronically signed by: WILBERTO LIN MD  Date:     12/08/17  Time:    13:13              Narrative:    CT of the head without contrast.    Clinical indication: ams.Altered mental status    Standard noncontrast CT scan of the brain. Compared to November.      The brain and ventricles again show generalized changes of atrophy, both supra-and infratentorial. There are low-attenuation changes in the periventricular and subcortical white matter. No hemorrhage, mass lesion, hydrocephalus, or midline shift. No acute/depressed skull fracture. Scan is degraded by motion artifact.                             X-Ray Chest 1 View (Final result)  Result time 12/08/17 12:26:13    Final result by Wilberto Lin III, MD (12/08/17 12:26:13)                 Impression:         Negative one view chest x-ray.      Electronically signed by: WILBERTO LIN MD  Date:     12/08/17  Time:    12:26              Narrative:    One view chest x-ray.    Clinical indication: Weakness.    Heart size is normal. The lung fields are clear. No acute pulmonary infiltrate.                                 Pending Diagnostic Studies:     Procedure Component Value Units Date/Time    HIV-1 GenotypR PLUS [[id number]] Collected:  12/14/17 0434    Order Status:  Sent Lab Status:   In process Updated:  12/14/17 1610    Specimen:  Blood from Blood     Hepatitis B Viral DNA, Quantitative [[id number]] Collected:  12/13/17 1838    Order Status:  Sent Lab Status:  In process Updated:  12/14/17 1610    Specimen:  Blood from Blood     Hepatitis B core antibody, total [[id number]] Collected:  12/13/17 1803    Order Status:  Sent Lab Status:  In process Updated:  12/14/17 1436    Specimen:  Blood from Blood     Hepatitis B surface antibody [[id number]] Collected:  12/13/17 1803    Order Status:  Sent Lab Status:  In process Updated:  12/14/17 1436    Specimen:  Blood from Blood     Hepatitis B surface antigen [[id number]] Collected:  12/13/17 1803    Order Status:  Sent Lab Status:  In process Updated:  12/14/17 1436    Specimen:  Blood from Blood     Hepatitis C genotype [[id number]] Collected:  12/13/17 1803    Order Status:  Sent Lab Status:  In process Updated:  12/14/17 1610    Specimen:  Blood from Blood          Medications:  Reconciled Home Medications:   Current Discharge Medication List      CONTINUE these medications which have NOT CHANGED    Details   albuterol-ipratropium 2.5mg-0.5mg/3mL (DUO-NEB) 0.5 mg-3 mg(2.5 mg base)/3 mL nebulizer solution Take 3 mLs by nebulization every 6 (six) hours while awake. Rescue  Qty: 1 Box, Refills: 0      amLODIPine (NORVASC) 5 MG tablet Take 5 mg by mouth once daily.       aspirin (ECOTRIN) 81 MG EC tablet Take 81 mg by mouth once daily.       azithromycin 200 mg/5 ml (ZITHROMAX) 200 mg/5 mL suspension Take 30 mLs (1,200 mg total) by mouth once a week.  Qty: 1 Bottle, Refills: 0      cyclobenzaprine (FLEXERIL) 10 MG tablet Take 10 mg by mouth once daily.       dolutegravir 50 mg Tab Take 1 tablet (50 mg total) by mouth once daily.  Qty: 30 tablet, Refills: 0      emtricitabine-tenofovir 200-300 mg (TRUVADA) 200-300 mg Tab Take 1 tablet by mouth once daily.  Qty: 30 tablet, Refills: 0      gabapentin (NEURONTIN) 800 MG tablet Take 800 mg by mouth once daily.        insulin detemir (LEVEMIR FLEXTOUCH) 100 unit/mL (3 mL) SubQ InPn pen Inject 10 Units into the skin 2 (two) times daily.  Refills: 0      insulin NPH-insulin regular, 70/30, (NOVOLIN 70/30) 100 unit/mL (70-30) injection Inject subcutaneously 60 units in the morning and 40 units in the evening.      LUTEIN/ZEAXANTHIN (OCUVITE LUTEIN 25 ORAL) Take by mouth.      nortriptyline (PAMELOR) 10 MG capsule TAKE 2 (10mg) CAPS BY MOUTH AT BEDTIME      omeprazole (PRILOSEC) 40 MG capsule TAKE 1 CAPSULE BY MOUTH DAILY.      pravastatin (PRAVACHOL) 20 MG tablet TAKE 1 TABLET BY MOUTH DAILY.      predniSONE (DELTASONE) 20 MG tablet Take 40mg oral twice daily for 5 days, 40mg oral daily x 5 days, then 20mg oral daily for 11 days then discontinue  Qty: 41 tablet, Refills: 0      sulfamethoxazole-trimethoprim 400-80mg (BACTRIM,SEPTRA) 400-80 mg per tablet Take 2 tablets by mouth 3 (three) times daily.      enalapril (VASOTEC) 20 MG tablet TAKE 1 TABLET BY MOUTH 2 (TWO) TIMES DAILY.      mv-min-folic acid-lutein 500-250 mcg Chew Take 1 tablet by mouth.             Indwelling Lines/Drains at time of discharge:   Lines/Drains/Airways     Drain                 NG/OG Tube 12/09/17 1800 nasogastric;nonweighted 16 Fr. Left nostril 5 days          Pressure Ulcer                 Pressure Ulcer 11/12/17 2021 posterior sacral spine Stage III 31 days                Time spent on the discharge of patient: > 30 minutes  Patient was seen and examined on the date of discharge and determined to be suitable for discharge.         Augie Birch NP  Department of Hospital Medicine  [facility]

## 2017-12-15 NOTE — PROGRESS NOTES
1851-This RN called by monitor tech and notified of 30 second pause on tele monitor.      1854-Upon arrival to pt room, this RN called by monitor tech and notified of asystole on tele monitor.  Pt noted to not be breathing and no pulses present.  MD called to bedside by Charge nurse.    1858-Time of death called by Shar MANDEL. Pt ex wife, daughter, and sister in law at bedside (son in parking lot per sister in law).

## 2017-12-15 NOTE — PROGRESS NOTES
Residual amount 180mL will continue to hold tube feeding at this time.  NP notified and stated to continue to hold until residual less than 40, if residual less than 40 okay to start tube feeding at 10mL/hr.  Will continue to monitor.

## 2017-12-15 NOTE — PROGRESS NOTES
Called by RN to pronounce the pt. O/E pt unresponsive, no pulse or respiration on auscultation. Pupils fixed and dilated, obviously jaundiced. Pt pronounced dead at 6.58 pm on 12/14/17. Family present around the pt

## 2017-12-18 LAB
BACTERIA SPEC ANAEROBE CULT: NORMAL
HCV GENTYP SERPL NAA+PROBE: ABNORMAL
HCV QUALITATIVE RESULT: DETECTED
HCV QUANTITATIVE LOG: 6.57 LOG (10) IU/ML
HCV RNA SPEC NAA+PROBE-ACNC: ABNORMAL IU/ML

## 2017-12-20 LAB
HEPATITIS B VIRAL DNA - QUANTITATIVE: <10 IU/ML
HEPATITIS B VIRUS DNA: NOT DETECTED
LOG HBV IU/ML: <1 LOG (10) IU/ML

## 2017-12-20 NOTE — PHYSICIAN QUERY
PT Name: Jeffrey Rosales  MR #: [id number]    Physician Query Form - Nutrition Clarification     CDS/: Isabelle Mcleod               Contact information: [email or web address]    This form is a permanent document in the medical record.     Query Date: December 20, 2017    By submitting this query, we are merely seeking further clarification of documentation.. Please utilize your independent clinical judgment when addressing the question(s) below.    The Medical record contains the following:   Indicators  Supporting Clinical Findings Location in Medical Record    x % of Estimated Energy Intake over a time frame from p.o., TF, or TPN  inadequate energy intake   Dietitian Consult 12/12    Weight Status over a time frame      Subcutaneous Fat and/or Muscle Loss      Fluid Accumulation or Edema      Reduced  Strength      x Wt / BMI / Usual Body Weight  BMI 26  Dietitian Consult 12/12    Delayed Wound Healing / Failure to Thrive      x Acute or Chronic Illness   Acute Hepatic Failure,HIV ,PU of sacrum stage3,SBP, ,decrease appetite   Dietitian Consult 12/12    Medication      x Treatment  TF  Dietitian Consult 12/12    x Other  appears [facility] PN 12/11     AND / ASPEN Clinical Characteristics (October 2011)  A minimum of two characteristics is recommended for diagnosing either moderate or severe malnutrition   Mild Malnutrition Moderate Malnutrition Severe Malnutrition   Energy Intake from p.o., TF or TPN. < 75% intake of estimated energy needs for less than 7 days < 75% intake of estimated energy needs for greater than 7 days < 50% intake of estimated energy needs for > 5 days   Weight Loss 1-2% in 1 month  5% in 3 months  7.5% in 6 months  10% in 1 year 1-2 % in 1 week  5% in 1 month  7.5% in 3 months  10% in 6 months  20% in 1 year > 2% in 1 week  > 5% in 1 month  > 7.5% in 3 months  > 10% in 6 months  > 20% in 1 year   Physical Findings     None *Mild subcutaneous fat and/or muscle loss  *Mild  fluid accumulation  *Stage II decubitus  *Surgical wound or non-healing wound *Mod/severe subcutaneous fat and/or muscle loss  *Mod/severe fluid accumulation  *Stage III or IV decubitus  *Non-healing surgical wound     Provider, please specify diagnosis or diagnoses associated with above clinical findings.    [ ] Mild Protein-Calorie Malnutrition  [ ] Moderate Protein-Calorie Malnutrition  [ x] Severe Protein-Calorie Malnutrition  [ ] Other:   [ ] Clinically Undetermined    Please document in your progress notes daily for the duration of treatment until resolved and include in your discharge summary.

## 2019-11-08 NOTE — HPI
Patient is poor historian due to somnolence.  History obtained from patient's daughter , ED staff, and past medical record in Care Everywhere.  66 year old  male  with history of of HIV/AIDS-cd4 count on 08/2017-247,cd4% 28 ,HIV viral load -6.4 million , HCV, HTN, HLD, DM II, DDD with chronic back pain, PUD, and h/o anal cancer, who presented to the ED with c/o generalized weakness, fatigue, and malaise that has progressively worsened over the past 2 weeks.  Associated subjective fevers, lethargy, and ~15-20 pound weight loss (unintentional) in past 3 months .  .  Patient's son also reports new abrasion to mid back that he noticed a few days ago with clear drainage.  Initial work-up in ED noted temp 100.6, , RR 23, O2 sat 92% on RA.  CT head with no acute process.  CTA of the chest -11/01-  2.  1.6 cm right lower lobe pulmonary nodule.    3.  Scattered pulmonary emphysema changes.    From the last ID follow up note on 08/27-[facility] reported that he has not picked up antiretrovirals in months. Last refill date for Tivicay February 2017 and Truvada April 2017.  He follows up with [facility] oncology for anal cancer.  At that visit ,his HAART therapy was stopped.     NA
